# Patient Record
Sex: FEMALE | Race: WHITE | NOT HISPANIC OR LATINO | Employment: FULL TIME | URBAN - METROPOLITAN AREA
[De-identification: names, ages, dates, MRNs, and addresses within clinical notes are randomized per-mention and may not be internally consistent; named-entity substitution may affect disease eponyms.]

---

## 2017-05-02 ENCOUNTER — APPOINTMENT (OUTPATIENT)
Dept: GENERAL RADIOLOGY | Facility: CLINIC | Age: 67
End: 2017-05-02
Attending: EMERGENCY MEDICINE
Payer: COMMERCIAL

## 2017-05-02 ENCOUNTER — HOSPITAL ENCOUNTER (EMERGENCY)
Facility: CLINIC | Age: 67
Discharge: HOME | End: 2017-05-02
Attending: EMERGENCY MEDICINE | Admitting: EMERGENCY MEDICINE
Payer: COMMERCIAL

## 2017-05-02 VITALS
TEMPERATURE: 98.1 F | HEART RATE: 81 BPM | SYSTOLIC BLOOD PRESSURE: 123 MMHG | DIASTOLIC BLOOD PRESSURE: 66 MMHG | OXYGEN SATURATION: 97 % | RESPIRATION RATE: 16 BRPM

## 2017-05-02 DIAGNOSIS — I95.1 ORTHOSTATIC SYNCOPE: ICD-10-CM

## 2017-05-02 LAB
ALBUMIN SERPL-MCNC: 3.5 G/DL (ref 3.4–5)
ALBUMIN UR-MCNC: NEGATIVE MG/DL
ALP SERPL-CCNC: 65 U/L (ref 40–150)
ALT SERPL W P-5'-P-CCNC: 20 U/L (ref 0–50)
ANION GAP SERPL CALCULATED.3IONS-SCNC: 5 MMOL/L (ref 3–14)
APPEARANCE UR: CLEAR
AST SERPL W P-5'-P-CCNC: 13 U/L (ref 0–45)
BASOPHILS # BLD AUTO: 0 10E9/L (ref 0–0.2)
BASOPHILS NFR BLD AUTO: 0.5 %
BILIRUB SERPL-MCNC: 0.4 MG/DL (ref 0.2–1.3)
BILIRUB UR QL STRIP: NEGATIVE
BUN SERPL-MCNC: 15 MG/DL (ref 7–30)
CALCIUM SERPL-MCNC: 8.6 MG/DL (ref 8.5–10.1)
CHLORIDE SERPL-SCNC: 105 MMOL/L (ref 94–109)
CO2 SERPL-SCNC: 29 MMOL/L (ref 20–32)
COLOR UR AUTO: YELLOW
CREAT SERPL-MCNC: 1.06 MG/DL (ref 0.52–1.04)
DIFFERENTIAL METHOD BLD: ABNORMAL
EOSINOPHIL # BLD AUTO: 0.1 10E9/L (ref 0–0.7)
EOSINOPHIL NFR BLD AUTO: 0.8 %
ERYTHROCYTE [DISTWIDTH] IN BLOOD BY AUTOMATED COUNT: 13.6 % (ref 10–15)
GFR SERPL CREATININE-BSD FRML MDRD: 52 ML/MIN/1.7M2
GLUCOSE SERPL-MCNC: 103 MG/DL (ref 70–99)
GLUCOSE UR STRIP-MCNC: NEGATIVE MG/DL
HCT VFR BLD AUTO: 35.3 % (ref 35–47)
HGB BLD-MCNC: 12 G/DL (ref 11.7–15.7)
HGB UR QL STRIP: NEGATIVE
HYALINE CASTS #/AREA URNS LPF: 3 /LPF (ref 0–2)
IMM GRANULOCYTES # BLD: 0 10E9/L (ref 0–0.4)
IMM GRANULOCYTES NFR BLD: 0.2 %
INTERPRETATION ECG - MUSE: NORMAL
KETONES UR STRIP-MCNC: NEGATIVE MG/DL
LEUKOCYTE ESTERASE UR QL STRIP: NEGATIVE
LYMPHOCYTES # BLD AUTO: 1.7 10E9/L (ref 0.8–5.3)
LYMPHOCYTES NFR BLD AUTO: 26.9 %
MCH RBC QN AUTO: 33 PG (ref 26.5–33)
MCHC RBC AUTO-ENTMCNC: 34 G/DL (ref 31.5–36.5)
MCV RBC AUTO: 97 FL (ref 78–100)
MONOCYTES # BLD AUTO: 0.5 10E9/L (ref 0–1.3)
MONOCYTES NFR BLD AUTO: 8.1 %
MUCOUS THREADS #/AREA URNS LPF: PRESENT /LPF
NEUTROPHILS # BLD AUTO: 4 10E9/L (ref 1.6–8.3)
NEUTROPHILS NFR BLD AUTO: 63.5 %
NITRATE UR QL: NEGATIVE
NRBC # BLD AUTO: 0 10*3/UL
NRBC BLD AUTO-RTO: 0 /100
PH UR STRIP: 6.5 PH (ref 5–7)
PLATELET # BLD AUTO: 199 10E9/L (ref 150–450)
POTASSIUM SERPL-SCNC: 4 MMOL/L (ref 3.4–5.3)
PROT SERPL-MCNC: 6.9 G/DL (ref 6.8–8.8)
RBC # BLD AUTO: 3.64 10E12/L (ref 3.8–5.2)
RBC #/AREA URNS AUTO: 1 /HPF (ref 0–2)
SODIUM SERPL-SCNC: 139 MMOL/L (ref 133–144)
SP GR UR STRIP: 1.01 (ref 1–1.03)
TROPONIN I SERPL-MCNC: NORMAL UG/L (ref 0–0.04)
URN SPEC COLLECT METH UR: ABNORMAL
UROBILINOGEN UR STRIP-MCNC: NORMAL MG/DL (ref 0–2)
WBC # BLD AUTO: 6.3 10E9/L (ref 4–11)
WBC #/AREA URNS AUTO: <1 /HPF (ref 0–2)

## 2017-05-02 PROCEDURE — 81001 URINALYSIS AUTO W/SCOPE: CPT | Performed by: EMERGENCY MEDICINE

## 2017-05-02 PROCEDURE — 99285 EMERGENCY DEPT VISIT HI MDM: CPT | Mod: 25

## 2017-05-02 PROCEDURE — 80053 COMPREHEN METABOLIC PANEL: CPT | Performed by: EMERGENCY MEDICINE

## 2017-05-02 PROCEDURE — 84484 ASSAY OF TROPONIN QUANT: CPT | Performed by: EMERGENCY MEDICINE

## 2017-05-02 PROCEDURE — 85025 COMPLETE CBC W/AUTO DIFF WBC: CPT | Performed by: EMERGENCY MEDICINE

## 2017-05-02 PROCEDURE — 25000128 H RX IP 250 OP 636: Performed by: EMERGENCY MEDICINE

## 2017-05-02 PROCEDURE — 93005 ELECTROCARDIOGRAM TRACING: CPT

## 2017-05-02 PROCEDURE — 71020 XR CHEST 2 VW: CPT

## 2017-05-02 PROCEDURE — 96361 HYDRATE IV INFUSION ADD-ON: CPT

## 2017-05-02 PROCEDURE — 96360 HYDRATION IV INFUSION INIT: CPT

## 2017-05-02 RX ORDER — SODIUM CHLORIDE 9 MG/ML
1000 INJECTION, SOLUTION INTRAVENOUS CONTINUOUS
Status: DISCONTINUED | OUTPATIENT
Start: 2017-05-02 | End: 2017-05-02 | Stop reason: HOSPADM

## 2017-05-02 RX ADMIN — SODIUM CHLORIDE 1000 ML: 9 INJECTION, SOLUTION INTRAVENOUS at 10:12

## 2017-05-02 NOTE — ED PROVIDER NOTES
CHIEF COMPLAINT:  Dizziness.      HISTORY OF PRESENT ILLNESS:  Helga Geronimo is a 67-year-old woman who was volunteering today when she became very lightheaded and began to slump towards the ground.  She was caught by other people that were there, and she did not actually lose complete consciousness or fall to the ground.  She states over the past month she has been having episodes where particularly when she gets up quickly, she gets lightheaded.  She denies any palpitations, chest pain, shortness of breath, belly pain, headache or any focal numbness or weakness.  She has been losing quite a bit weight on purpose and in 12/2016 had her blood pressure medication decreased by 1/2 because of the weight loss.  She has been continuing to lose weight since then but has not followed up with her doctor yet.  She has an appointment in a few weeks.  The patient denies any black or bloody stools or urinary symptoms.      PAST MEDICAL HISTORY:  Chronic back pain, hypertension, anxiety, depression.  She has had bilateral hip replacements and hysterectomy.  She also has had right knee surgery replacement.      MEDICATIONS:  Prozac, Norco, lisinopril, hydrochlorothiazide, Percocet, Risperdal, tramadol and trazodone.      ALLERGIES:  HMG-CoA R inhibitors.      FAMILY AND SOCIAL HISTORY:  She is here with her daughter.  She does live alone.  She smokes.  She does not drink.  She does do desk work.      REVIEW OF SYSTEMS:  As noted with all other systems being negative.      PHYSICAL EXAMINATION:   GENERAL:  Reveals a white female, supine.   VITAL SIGNS:  Initial blood pressure is 103/60 with pulse of 81, pulse ox 97% on room air.  Temperature 98.1.  Orthostatic blood pressures were changed and her blood pressure did drop down to 89, although her pulse did not change significantly.   HEENT:  Pupils equal, reactive, extraocular movements intact.  Nares and oropharynx clear.   NECK:  Supple and nontender.  No neck vein distention    PULMONARY:  Clear to auscultation.   HEART:  Heart tones regular, no murmurs, rubs or gallops.   ABDOMEN:  Soft.  No mass or hepatosplenomegaly.  2+ femoral pulses.   MUSCULOSKELETAL:  No tenderness of arms or legs.  No back tenderness.   SKIN:  No rash.   LYMPHATICS:  No inguinal adenopathy.   NEUROLOGIC:  Awake, alert and appropriate, fluent speech, no facial asymmetry.  Normal motor and sensation throughout.  Normal coordination.   PSYCHIATRIC:  Normal affect.      EMERGENCY DEPARTMENT COURSE:  The patient was placed on EKG, blood pressure and oximetry monitoring.  EKG was obtained.  This reveals a normal sinus rhythm with a rate of 69, VT interval 168, QRS 78, QTc 404.  No acute ischemic changes are seen.  Chest x-ray is unremarkable.  Lab work reveals unremarkable urine, a white count of 6.3, hemoglobin 12, platelets of 199,000.  Electrolytes, glucose 103, creatinine 1.06, all other electrolytes were normal, troponin less than 0.015.  The patient received a liter of fluids and some food and she is feeling better.  Her symptoms seem most consistent with orthostatic hypotension.  She did have her blood pressure cut in half 4 months ago when she started losing weight.  She is still losing weight and her blood pressures here have been remarkably stable and low.  I think taking her off her blood pressure medicine since it also contains a diuretic would be most beneficial.  We can see how she does and she can follow up with her PMD in a few weeks to recheck.  I think this is unlikely to be acute ischemia or arrhythmia, no sign of PE or dissection.  She does not appear toxic or  GI blood loss.      IMPRESSION:  Orthostatic near-syncope.      PLAN:  As noted.         STALIN BENEDICT MD             D: 2017 11:53   T: 2017 12:32   MT:       Name:     LOREE BECK   MRN:      [id number]        Account:      [id number]   :      1950           Visit Date:   2017      Document:  [id number]

## 2017-05-02 NOTE — ED AVS SNAPSHOT
Emergency Department    [address] [place] [address]    Phone:  [phone]    Fax:  [phone]                                       Helga Geronmio   MRN: [id number]    Department:   Emergency Department   Date of Visit:  5/2/2017           Patient Information     Date Of Birth          1950        Your diagnoses for this visit were:     Orthostatic syncope        You were seen by Doe Rasmussen MD.      Follow-up Information     Schedule an appointment as soon as possible for a visit with Jennie Morales PA-C.    Specialty:  Physician Assistant    Why:  hold lisinopril/hctz    Contact information:    [facility]  [address] [place] [address]  [phone]          Discharge Instructions         Orthostatic Low Blood Pressure (Hypotension)  A blood pressure reading is made up of 2 numbers There is a top number over a bottom number. The top number is the systolic pressure. The bottom number is the diastolic pressure. A normal blood pressure is less than 120 over less than 80. Low blood pressure (hypotension) is a blood pressure that is less than what is normal for you.  Orthostatic hypotension is a type of low blood pressure that occurs only when you change position from lying to standing. It can cause dizziness, lightheadedness, or fainting.  Medicines can cause orthostatic hypotension. These include:    High blood pressure medicines    Water pills (diuretics)    Some heart medicines    Some antidepressants    Pain, anxiety, sedative, and sleeping medicines  Other causes include:    Dehydration from vomiting, diarrhea, or not getting enough fluids    Severe infection    High fever    Blood loss. This could be bleeding from the stomach or intestines.  Treatment will depend on what is causing your low blood pressure.  Home care  Follow these guidelines when caring for yourself at home:    Rest until your symptoms get better.    Change positions slowly from lying to  standing. When getting out of bed, sit on the side of the bed with your legs down for at least 30 seconds before standing. This gives your body time to adjust to the position change.    Follow the treatment plan described by your health care provider.  Follow-up care  Follow up with your health care provider, or as advised.  When to seek medical advice  Call your health care provider right away if any of these occur:    Dizziness, lightheadedness, or fainting    Black or red color in your stools or vomit    Diarrhea or vomiting that doesn t go away    You aren t able to eat or drink    Fever of 100.4 F (38 C) or higher, or as directed by your health care provider    Burning when you urinate    Foul-smelling urine    3862-6907 [employer]. [address] [place] [address], [place] [address]. All rights reserved. This information is not intended as a substitute for professional medical care. Always follow your healthcare professional's instructions.          24 Hour Appointment Hotline       To make an appointment at any [facility], call [phone] ([phone]). If you don't have a family doctor or clinic, we will help you find one. [facility] are conveniently located to serve the needs of you and your family.             Review of your medicines      Our records show that you are taking the medicines listed below. If these are incorrect, please call your family doctor or clinic.        Dose / Directions Last dose taken    HYDROcodone-acetaminophen 5-325 MG per tablet   Commonly known as:  NORCO   Dose:  1-2 tablet   Quantity:  20 tablet        Take 1-2 tablets by mouth every 4 hours as needed for pain   Refills:  0        LISINOPRIL PO        Refills:  0        oxyCODONE-acetaminophen 5-325 MG per tablet   Commonly known as:  PERCOCET   Dose:  1-2 tablet   Quantity:  20 tablet        Take 1-2 tablets by mouth every 4 hours as needed for moderate to severe pain   Refills:  0        PROZAC PO    Dose:  40 mg        Take 40 mg by mouth 2 times daily   Refills:  0        RISPERIDONE PO        Refills:  0        TRAMADOL HCL PO        Refills:  0        TRAZODONE HCL PO   Dose:  50 mg        Take 50 mg by mouth At Bedtime   Refills:  0        UNKNOWN TO PATIENT        HCTZ-combined with unknown medication   Refills:  0                Procedures and tests performed during your visit     CBC with platelets differential    Comprehensive metabolic panel    EKG 12 lead    Troponin I    UA with Microscopic    XR Chest 2 Views      Orders Needing Specimen Collection     None      Pending Results     No orders found from 4/30/2017 to 5/3/2017.            Pending Culture Results     No orders found from 4/30/2017 to 5/3/2017.            Pending Results Instructions     If you had any lab results that were not finalized at the time of your Discharge, you can call the ED Lab Result RN at [phone]. You will be contacted by this team for any positive Lab results or changes in treatment. The nurses are available 7 days a week from 10A to 6:30P.  You can leave a message 24 hours per day and they will return your call.        Test Results From Your Hospital Stay        5/2/2017 10:34 AM      Component Results     Component Value Ref Range & Units Status    WBC 6.3 4.0 - 11.0 10e9/L Final    RBC Count 3.64 (L) 3.8 - 5.2 10e12/L Final    Hemoglobin 12.0 11.7 - 15.7 g/dL Final    Hematocrit 35.3 35.0 - 47.0 % Final    MCV 97 78 - 100 fl Final    MCH 33.0 26.5 - 33.0 pg Final    MCHC 34.0 31.5 - 36.5 g/dL Final    RDW 13.6 10.0 - 15.0 % Final    Platelet Count 199 150 - 450 10e9/L Final    Diff Method Automated Method  Final    % Neutrophils 63.5 % Final    % Lymphocytes 26.9 % Final    % Monocytes 8.1 % Final    % Eosinophils 0.8 % Final    % Basophils 0.5 % Final    % Immature Granulocytes 0.2 % Final    Nucleated RBCs 0 0 /100 Final    Absolute Neutrophil 4.0 1.6 - 8.3 10e9/L Final    Absolute Lymphocytes 1.7 0.8 - 5.3  10e9/L Final    Absolute Monocytes 0.5 0.0 - 1.3 10e9/L Final    Absolute Eosinophils 0.1 0.0 - 0.7 10e9/L Final    Absolute Basophils 0.0 0.0 - 0.2 10e9/L Final    Abs Immature Granulocytes 0.0 0 - 0.4 10e9/L Final    Absolute Nucleated RBC 0.0  Final         5/2/2017 11:06 AM      Component Results     Component Value Ref Range & Units Status    Sodium 139 133 - 144 mmol/L Final    Potassium 4.0 3.4 - 5.3 mmol/L Final    Chloride 105 94 - 109 mmol/L Final    Carbon Dioxide 29 20 - 32 mmol/L Final    Anion Gap 5 3 - 14 mmol/L Final    Glucose 103 (H) 70 - 99 mg/dL Final    Urea Nitrogen 15 7 - 30 mg/dL Final    Creatinine 1.06 (H) 0.52 - 1.04 mg/dL Final    GFR Estimate 52 (L) >60 mL/min/1.7m2 Final    Non  GFR Calc    GFR Estimate If Black 63 >60 mL/min/1.7m2 Final    African American GFR Calc    Calcium 8.6 8.5 - 10.1 mg/dL Final    Bilirubin Total 0.4 0.2 - 1.3 mg/dL Final    Albumin 3.5 3.4 - 5.0 g/dL Final    Protein Total 6.9 6.8 - 8.8 g/dL Final    Alkaline Phosphatase 65 40 - 150 U/L Final    ALT 20 0 - 50 U/L Final    AST 13 0 - 45 U/L Final         5/2/2017 11:06 AM      Component Results     Component Value Ref Range & Units Status    Troponin I ES  0.000 - 0.045 ug/L Final    <0.015  The 99th percentile for upper reference range is 0.045 ug/L.  Troponin values in   the range of 0.045 - 0.120 ug/L may be associated with risks of adverse   clinical events.           5/2/2017 10:53 AM      Component Results     Component Value Ref Range & Units Status    Color Urine Yellow  Final    Appearance Urine Clear  Final    Glucose Urine Negative NEG mg/dL Final    Bilirubin Urine Negative NEG Final    Ketones Urine Negative NEG mg/dL Final    Specific Gravity Urine 1.012 1.003 - 1.035 Final    Blood Urine Negative NEG Final    pH Urine 6.5 5.0 - 7.0 pH Final    Protein Albumin Urine Negative NEG mg/dL Final    Urobilinogen mg/dL Normal 0.0 - 2.0 mg/dL Final    Nitrite Urine Negative NEG Final     Leukocyte Esterase Urine Negative NEG Final    Source Midstream Urine  Final    WBC Urine <1 0 - 2 /HPF Final    RBC Urine 1 0 - 2 /HPF Final    Mucous Urine Present (A) NEG /LPF Final    Hyaline Casts 3 (H) 0 - 2 /LPF Final         5/2/2017 10:35 AM      Narrative     XR CHEST 2 VW 5/2/2017 10:33 AM    HISTORY: dizzy, smoker        Impression     IMPRESSION: Negative.    BG PARIS MD                Clinical Quality Measure: Blood Pressure Screening     Your blood pressure was checked while you were in the emergency department today. The last reading we obtained was  BP: 120/78 . Please read the guidelines below about what these numbers mean and what you should do about them.  If your systolic blood pressure (the top number) is less than 120 and your diastolic blood pressure (the bottom number) is less than 80, then your blood pressure is normal. There is nothing more that you need to do about it.  If your systolic blood pressure (the top number) is 120-139 or your diastolic blood pressure (the bottom number) is 80-89, your blood pressure may be higher than it should be. You should have your blood pressure rechecked within a year by a primary care provider.  If your systolic blood pressure (the top number) is 140 or greater or your diastolic blood pressure (the bottom number) is 90 or greater, you may have high blood pressure. High blood pressure is treatable, but if left untreated over time it can put you at risk for heart attack, stroke, or kidney failure. You should have your blood pressure rechecked by a primary care provider within the next 4 weeks.  If your provider in the emergency department today gave you specific instructions to follow-up with your doctor or provider even sooner than that, you should follow that instruction and not wait for up to 4 weeks for your follow-up visit.        Thank you for choosing [facility]       Thank you for choosing [facility] for your care. Our goal is always to provide  "you with excellent care. Hearing back from our patients is one way we can continue to improve our services. Please take a few minutes to complete the written survey that you may receive in the mail after you visit with us. Thank you!        [employer] Information     [employer] lets you send messages to your doctor, view your test results, renew your prescriptions, schedule appointments and more. To sign up, go to [email or web address] . Click on \"Log in\" on the left side of the screen, which will take you to the Welcome page. Then click on \"Sign up Now\" on the right side of the page.     You will be asked to enter the access code listed below, as well as some personal information. Please follow the directions to create your username and password.     Your access code is: [email or web address]  Expires: 2017  9:45 AM     Your access code will  in 90 days. If you need help or a new code, please call your [facility] clinic or [phone].        Care EveryWhere ID     This is your Care EveryWhere ID. This could be used by other organizations to access your [facility] medical records  [id number]        After Visit Summary       This is your record. Keep this with you and show to your community pharmacist(s) and doctor(s) at your next visit.                  "

## 2017-05-02 NOTE — ED NOTES
Bed: ED10  Expected date:   Expected time:   Means of arrival:   Comments:  425  67 F syncope/hypotensive  0952

## 2017-05-02 NOTE — DISCHARGE INSTRUCTIONS
Orthostatic Low Blood Pressure (Hypotension)  A blood pressure reading is made up of 2 numbers There is a top number over a bottom number. The top number is the systolic pressure. The bottom number is the diastolic pressure. A normal blood pressure is less than 120 over less than 80. Low blood pressure (hypotension) is a blood pressure that is less than what is normal for you.  Orthostatic hypotension is a type of low blood pressure that occurs only when you change position from lying to standing. It can cause dizziness, lightheadedness, or fainting.  Medicines can cause orthostatic hypotension. These include:    High blood pressure medicines    Water pills (diuretics)    Some heart medicines    Some antidepressants    Pain, anxiety, sedative, and sleeping medicines  Other causes include:    Dehydration from vomiting, diarrhea, or not getting enough fluids    Severe infection    High fever    Blood loss. This could be bleeding from the stomach or intestines.  Treatment will depend on what is causing your low blood pressure.  Home care  Follow these guidelines when caring for yourself at home:    Rest until your symptoms get better.    Change positions slowly from lying to standing. When getting out of bed, sit on the side of the bed with your legs down for at least 30 seconds before standing. This gives your body time to adjust to the position change.    Follow the treatment plan described by your health care provider.  Follow-up care  Follow up with your health care provider, or as advised.  When to seek medical advice  Call your health care provider right away if any of these occur:    Dizziness, lightheadedness, or fainting    Black or red color in your stools or vomit    Diarrhea or vomiting that doesn t go away    You aren t able to eat or drink    Fever of 100.4 F (38 C) or higher, or as directed by your health care provider    Burning when you urinate    Foul-smelling urine    [id number] [facility]  [employer]. [address] [place] [address], [place] [address]. All rights reserved. This information is not intended as a substitute for professional medical care. Always follow your healthcare professional's instructions.

## 2017-05-02 NOTE — ED AVS SNAPSHOT
Emergency Department    [address] [place] [address]    Phone:  [phone]    Fax:  [phone]                                       Helga Geronimo   MRN: [id number]    Department:   Emergency Department   Date of Visit:  5/2/2017           After Visit Summary Signature Page     I have received my discharge instructions, and my questions have been answered. I have discussed any challenges I see with this plan with the nurse or doctor.    ..........................................................................................................................................  Patient/Patient Representative Signature      ..........................................................................................................................................  Patient Representative Print Name and Relationship to Patient    ..................................................               ................................................  Date                                            Time    ..........................................................................................................................................  Reviewed by Signature/Title    ...................................................              ..............................................  Date                                                            Time

## 2017-05-05 ENCOUNTER — HOSPITAL ENCOUNTER (OUTPATIENT)
Dept: MAMMOGRAPHY | Facility: CLINIC | Age: 67
Discharge: HOME | End: 2017-05-05
Attending: PHYSICIAN ASSISTANT | Admitting: PHYSICIAN ASSISTANT
Payer: COMMERCIAL

## 2017-05-05 DIAGNOSIS — Z12.31 VISIT FOR SCREENING MAMMOGRAM: ICD-10-CM

## 2017-05-05 PROCEDURE — G0202 SCR MAMMO BI INCL CAD: HCPCS

## 2018-05-07 ENCOUNTER — HOSPITAL ENCOUNTER (OUTPATIENT)
Dept: MAMMOGRAPHY | Facility: CLINIC | Age: 68
Discharge: HOME | End: 2018-05-07
Attending: PHYSICIAN ASSISTANT | Admitting: PHYSICIAN ASSISTANT
Payer: COMMERCIAL

## 2018-05-07 DIAGNOSIS — Z12.39 BREAST CANCER SCREENING: ICD-10-CM

## 2018-05-07 PROCEDURE — 77067 SCR MAMMO BI INCL CAD: CPT

## 2018-06-04 ENCOUNTER — THERAPY VISIT (OUTPATIENT)
Dept: PHYSICAL THERAPY | Facility: CLINIC | Age: 68
End: 2018-06-04
Payer: COMMERCIAL

## 2018-06-04 DIAGNOSIS — M25.512 LEFT SHOULDER PAIN: Primary | ICD-10-CM

## 2018-06-04 PROCEDURE — 97112 NEUROMUSCULAR REEDUCATION: CPT | Mod: GP | Performed by: PHYSICAL THERAPIST

## 2018-06-04 PROCEDURE — 97161 PT EVAL LOW COMPLEX 20 MIN: CPT | Mod: GP | Performed by: PHYSICAL THERAPIST

## 2018-06-04 PROCEDURE — 97110 THERAPEUTIC EXERCISES: CPT | Mod: GP | Performed by: PHYSICAL THERAPIST

## 2018-06-04 NOTE — PROGRESS NOTES
"[facility] Initial Evaluation  Subjective:  Patient is a 68 year old female presenting with rehab left shoulder hpi. The history is provided by the patient. No  was used.   Helga Geronimo is a 68 year old female with a left shoulder condition.        Patient had insidious exacerbation of left shoulder/upper trapezius area pain about 6 months ago, has had pain for ~ 5 years.  Pain was constant, ranged 7-8/10 prior to injection (on 5-25-18), 0-2/10 since injection - described as \"deep ache\" and \"sharp\" if reaching.  Symptoms increase with reaching out to the side, reaching behind to hook bra, lying on the left side >1 hour (losing <1 hour sleep/night).  Symptoms decrease with ice.  Current exercises include doorway stretch in abd/ER position and a wall-push up stretch - both hurt.  Patient is right-handed.  .             Pain is the same all the time.     Pain course: worsening prior to injection; improved since injection.    Previous treatment includes chiropractic (massage and exercises ).    General health as reported by patient is good.  Pertinent medical history includes:  Depression, high blood pressure, smoking and osteoarthritis.  Medical allergies: yes (statins).  Other surgeries include:  Orthopedic surgery (R TKA; B ANGELINA).  Current medications:  High blood pressure medication, sleep medication and anti-depressants.  Current occupation is Clinical research - [employer]  Patient is working in normal job with restrictions.  Primary job tasks include:  Prolonged sitting.    Barriers include:  None as reported by the patient.    Red flags:  None as reported by the patient.                        Objective:  Standing Alignment:    Cervical/Thoracic:  Forward head and cervical lordosis decreased  Shoulder/UE:  Rounded shoulders and superior fossa atrophy R                                       Shoulder Evaluation:  ROM:  AROM:    Flexion:  Left:  130, pain past 120  "   Right:  WNL    Abduction:  Left: 98, pain past 90 degrees   Right:  WNL    Internal Rotation:  Left:  WNL    Right:  WNL  External Rotation:  Left:  31 at 60 degrees abduction    Right:  69-ERP            Extension/Internal Rotation:  Left:  [id number]    Right:  T6    PROM:    Flexion:  Left:  149        Extension:  Left:  Mod restriction    Right:  WNL      Internal Rotation:  Right:  WNL  External Rotation:  Left:  30 degrees at 60 degrees abduction    Right:  69-ERP                        Special Tests:      Left shoulder negative for the following special tests:  Impingement and Bursal    Right shoulder negative for the following special tests:Impingement and Bursal                                       General     ROS   MMT right shoulder WNL.  MMT left shoulder flexion and IR 4/5, ER and abduction 4+/5, elbow WNL.     Trial repeated right shoulder extension with overpressure with wand in standing:  Increase abduction ROM to 128 degrees, flexion to 136 degrees and pain at end range only.       Assessment/Plan:    Patient is a 68 year old female with left side shoulder complaints.    Patient has the following significant findings with corresponding treatment plan.                Diagnosis 1:  Left shoulder pain/OA    Pain -  self management, education, directional preference exercise and home program  Decreased ROM/flexibility - therapeutic exercise and home program  Decreased strength - therapeutic exercise, therapeutic activities and home program  Decreased function - therapeutic activities and home program    Therapy Evaluation Codes:   1) History comprised of:   Personal factors that impact the plan of care:      Time since onset of symptoms.    Comorbidity factors that impact the plan of care are:      None.     Medications impacting care: None.  2) Examination of Body Systems comprised of:   Body structures and functions that impact the plan of care:      Shoulder.   Activity limitations that impact  the plan of care are:      Dressing, Lifting, Sleeping and Laying down.  3) Clinical presentation characteristics are:   Stable/Uncomplicated.  4) Decision-Making    Low complexity using standardized patient assessment instrument and/or measureable assessment of functional outcome.  Cumulative Therapy Evaluation is: Low complexity.    Previous and current functional limitations:  (See Goal Flow Sheet for this information)    Short term and Long term goals: (See Goal Flow Sheet for this information)     Communication ability:  Patient appears to be able to clearly communicate and understand verbal and written communication and follow directions correctly.  Treatment Explanation - The following has been discussed with the patient:   RX ordered/plan of care  Anticipated outcomes  Possible risks and side effects  This patient would benefit from PT intervention to resume normal activities.   Rehab potential is good.    Frequency:  2 X week, once daily for 1 week, then 1x/week once daily for 4 weeks  Discharge Plan:  Achieve all LTG.  Independent in home treatment program.  Reach maximal therapeutic benefit.      Please refer to the daily flowsheet for treatment today, total treatment time and time spent performing 1:1 timed codes.

## 2018-06-04 NOTE — MR AVS SNAPSHOT
"              After Visit Summary   6/4/2018    Helga Geronimo    MRN: [id number]           Patient Information     Date Of Birth          1950        Visit Information        Provider Department      6/4/2018 4:00 PM Mercedez Troy, PT [facility] Athletic [facility] Physical [facility]        Today's Diagnoses     Left shoulder pain    -  1       Follow-ups after your visit        Your next 10 appointments already scheduled     Jun 07, 2018  3:50 PM CDT   DA Extremity with Sylvia Holley PT   [facility] Physical Therapy ([facility]  )    [address] [place] [address]  [facility] [id number] [phone]            Jun 14, 2018  3:50 PM CDT   DA Extremity with Sylvia Holley PT   [facility] Physical [facility] ([facility]  )    [address] [place] [address]  [facility] [id number] [phone]              Who to contact     If you have questions or need follow up information about today's clinic visit or your schedule please contact [facility] PHYSICAL THERAPY directly at [phone].  Normal or non-critical lab and imaging results will be communicated to you by Theranoshart, letter or phone within 4 business days after the clinic has received the results. If you do not hear from us within 7 days, please contact the clinic through Theranoshart or phone. If you have a critical or abnormal lab result, we will notify you by phone as soon as possible.  Submit refill requests through [employer] or call your pharmacy and they will forward the refill request to us. Please allow 3 business days for your refill to be completed.          Additional Information About Your Visit        MyChart Information     [employer] lets you send messages to your doctor, view your test results, renew your prescriptions, schedule appointments and more. To sign up, go to [email or web address] . Click on \"Log in\" on the left " "side of the screen, which will take you to the Welcome page. Then click on \"Sign up Now\" on the right side of the page.     You will be asked to enter the access code listed below, as well as some personal information. Please follow the directions to create your username and password.     Your access code is: [email or web address]  Expires: 2018  6:12 PM     Your access code will  in 90 days. If you need help or a new code, please call your [facility] or [phone].        Care EveryWhere ID     This is your Care EveryWhere ID. This could be used by other organizations to access your [place] medical records  [id number]         Blood Pressure from Last 3 Encounters:   17 123/66   16 118/72   04/27/15 120/77    Weight from Last 3 Encounters:   16 114.3 kg (252 lb)   04/27/15 104.3 kg (230 lb)   13 113.4 kg (250 lb)              We Performed the Following     HC PT EVAL, LOW COMPLEXITY     AD INITIAL EVAL REPORT     NEUROMUSCULAR RE-EDUCATION     THERAPEUTIC EXERCISES        Primary Care Provider Office Phone # Fax #    Jennie Morales PA-C [phone]       [facility] [address] [place] [address]        Equal Access to Services     ARNOLDO FOUNT[facility] : Hadii aad ku hadasho Soomaali, waaxda luqadaha, qaybta kaalmada adeegyada, bonnie ferrer. So [facility] [phone].    ATENCIÓN: Si habla español, tiene a scales disposición servicios gratuitos de asistencia lingüística. Galindo al [phone].    We comply with applicable federal civil rights laws and [place] laws. We do not discriminate on the basis of race, color, national origin, age, disability, sex, sexual orientation, or gender identity.            Thank you!     Thank you for choosing [facility] PHYSICAL THERAPY  for your care. Our goal is always to provide you with excellent care. Hearing back from our patients is one way we can continue to improve our " services. Please take a few minutes to complete the written survey that you may receive in the mail after your visit with us. Thank you!             Your Updated Medication List - Protect others around you: Learn how to safely use, store and throw away your medicines at www.disposemymeds.org.          This list is accurate as of 6/4/18  6:12 PM.  Always use your most recent med list.                   Brand Name Dispense Instructions for use Diagnosis    HYDROcodone-acetaminophen 5-325 MG per tablet    NORCO    20 tablet    Take 1-2 tablets by mouth every 4 hours as needed for pain        LISINOPRIL PO           oxyCODONE-acetaminophen 5-325 MG per tablet    PERCOCET    20 tablet    Take 1-2 tablets by mouth every 4 hours as needed for moderate to severe pain        PROZAC PO      Take 40 mg by mouth 2 times daily        RISPERIDONE PO           TRAMADOL HCL PO           TRAZODONE HCL PO      Take 50 mg by mouth At Bedtime        UNKNOWN TO PATIENT      HCTZ-combined with unknown medication

## 2018-06-14 ENCOUNTER — THERAPY VISIT (OUTPATIENT)
Dept: PHYSICAL THERAPY | Facility: CLINIC | Age: 68
End: 2018-06-14
Payer: COMMERCIAL

## 2018-06-14 DIAGNOSIS — M25.512 LEFT SHOULDER PAIN: ICD-10-CM

## 2018-06-14 PROCEDURE — 97112 NEUROMUSCULAR REEDUCATION: CPT | Mod: GP | Performed by: PHYSICAL THERAPIST

## 2018-06-14 PROCEDURE — 97110 THERAPEUTIC EXERCISES: CPT | Mod: GP | Performed by: PHYSICAL THERAPIST

## 2018-06-14 NOTE — LETTER
"[facility]AP[facility]  [address] [address]  [phone]    2019    Re: Helga Geronimo   :   1950  MRN:  [id number]   REFERRING PHYSICIAN:   Shola Yu    [facility]    Date of Initial Evaluation:  2018  Visits:  Rxs Used: 2  Reason for Referral:  Left shoulder pain     DISCHARGE REPORT  Progress reporting period is from 2018 to 2018.       SUBJECTIVE  When last seen on 2018, patient reported, \"I think it's feeling better.  I can do the exercises more often and more repetitions.\"  She reported being able to open her car door using her L hand and could also reach for her seatbelt easier.  She did the extension exercises about 3x/day--did not notice effect but usually did not have pain before doing them.  She reported her L arm was about as good as her R arm at that point, although, she still could not reach above her head quite as far.  Current objective measurements are unavailable as patient did not return for additional follow-up visits.  Current pain level is:  Unknown as patient did not return for additional follow-up visits.  Initial Pain level: (0-2/10 since injection 5-25-18; 7-8/10 prior to injection).   Changes in function:  Unknown as patient did not return for additional follow-up visits.  Adverse reaction to treatment or activity: Unknown as patient did not return for additional follow-up visits.    OBJECTIVE  Objective:  As of 2018:   L shoulder AROM:  flexion 145 degrees, abduction 135 degrees, IR/extension T9.      Current objective measurements are unavailable as patient did not return for additional follow-up visits.    ASSESSMENT/PLAN  Patient was only seen for 2 visits with treatment focusing on repeated L shoulder extension exercises in addition to scapular and rotator cuff strengthening exercises to address L " shoulder pain.  She reported improvements with pain and function at her last visit.  She has not returned for additional follow-up visits so current assessment is unavailable.  Updated problem list and treatment plan: Diagnosis 1:  L shoulder pain   No updated problem list or treatment plan as patient did not return for additional visits and is discharged from PT at this time.    STG/LTGs have been met or progress has been made towards goals:  Unknown as  patient did not return for additional follow-up visits.    Assessment of Progress: The patient has not returned to therapy. Current status is unknown.  Self Management Plans:  Patient has been instructed in a home treatment program.  Patient  has been instructed in self management of symptoms.  Helga continues to require the following intervention to meet STG and LTG's:  PT intervention is no longer required to meet STG/LTG.    Recommendations:  Patient is discharged from PT as she did not return for further follow-up visits.    Thank you for your referral.      INQUIRIES  Therapist: Sylvia Holley, PT, DPT  [facility] PHYSICAL THERAPY  [address] [address]  Phone: [phone]  Fax: [phone]

## 2018-06-14 NOTE — MR AVS SNAPSHOT
"              After Visit Summary   6/14/2018    Helga Geronimo    MRN: [id number]           Patient Information     Date Of Birth          1950        Visit Information        Provider Department      6/14/2018 3:50 PM Sylvia Holley PT [facility] Athletic [facility] Physical Therapy        Today's Diagnoses     Left shoulder pain           Follow-ups after your visit        Your next 10 appointments already scheduled     Jun 25, 2018  4:00 PM CDT   DA Extremity with Sylvia Holley PT   [facility] Athletic [facility] ([facility]  [facility]    [address] [id number] [phone]              Who to contact     If you have questions or need follow up information about today's clinic visit or your schedule please contact [facility] ATHLETIC [facility] PHYSICAL THERAPY directly at [phone].  Normal or non-critical lab and imaging results will be communicated to you by MyChart, letter or phone within 4 business days after the clinic has received the results. If you do not hear from us within 7 days, please contact the clinic through [employer] or phone. If you have a critical or abnormal lab result, we will notify you by phone as soon as possible.  Submit refill requests through [employer] or call your pharmacy and they will forward the refill request to us. Please allow 3 business days for your refill to be completed.          Additional Information About Your Visit        MyChart Information     [employer] lets you send messages to your doctor, view your test results, renew your prescriptions, schedule appointments and more. To sign up, go to [email or web address] . Click on \"Log in\" on the left side of the screen, which will take you to the Welcome page. Then click on \"Sign up Now\" on the right side of the page.     You will be asked to enter the access code listed below, as well as some personal information. Please follow the " directions to create your username and password.     Your access code is: [email or web address]  Expires: 2018  6:12 PM     Your access code will  in 90 days. If you need help or a new code, please call your [place] clinic or [phone].        Care EveryWhere ID     This is your Care EveryWhere ID. This could be used by other organizations to access your [place] medical records  [id number]         Blood Pressure from Last 3 Encounters:   17 123/66   16 118/72   04/27/15 120/77    Weight from Last 3 Encounters:   16 114.3 kg (252 lb)   04/27/15 104.3 kg (230 lb)   13 113.4 kg (250 lb)              We Performed the Following     Neuromuscular Re-Education     Therapeutic Exercises        Primary Care Provider Office Phone # Fax #    Jennie Morales PA-C [phone]       [facility] [address] [place] [address]        Equal Access to Services     [facility]: Hadii aad ku hadasho Soomaali, waaxda luqadaha, qaybta kaalmada adeegyada, waxay idiin haynyla brown . So [facility] [phone].    ATENCIÓN: Si habla español, tiene a scales disposición servicios gratuitos de asistencia lingüística. Llame al [phone].    We comply with applicable federal civil rights laws and [place] laws. We do not discriminate on the basis of race, color, national origin, age, disability, sex, sexual orientation, or gender identity.            Thank you!     Thank you for choosing [facility] PHYSICAL THERAPY  for your care. Our goal is always to provide you with excellent care. Hearing back from our patients is one way we can continue to improve our services. Please take a few minutes to complete the written survey that you may receive in the mail after your visit with us. Thank you!             Your Updated Medication List - Protect others around you: Learn how to safely use, store and throw away your medicines at www.disposemymeds.org.           This list is accurate as of 6/14/18  8:30 PM.  Always use your most recent med list.                   Brand Name Dispense Instructions for use Diagnosis    HYDROcodone-acetaminophen 5-325 MG per tablet    NORCO    20 tablet    Take 1-2 tablets by mouth every 4 hours as needed for pain        LISINOPRIL PO           oxyCODONE-acetaminophen 5-325 MG per tablet    PERCOCET    20 tablet    Take 1-2 tablets by mouth every 4 hours as needed for moderate to severe pain        PROZAC PO      Take 40 mg by mouth 2 times daily        RISPERIDONE PO           TRAMADOL HCL PO           TRAZODONE HCL PO      Take 50 mg by mouth At Bedtime        UNKNOWN TO PATIENT      HCTZ-combined with unknown medication

## 2019-02-11 NOTE — PROGRESS NOTES
"Subjective:  HPI                    Objective:  System    Physical Exam     General     ROS    Assessment/Plan:    DISCHARGE REPORT    Progress reporting period is from 06/04/2018 to 06/14/2018.       SUBJECTIVE  Subjective:  When last seen on 06/14/2018, patient reported, \"I think it's feeling better.  I can do the exercises more often and more repetitions.\"  She reported being able to open her car door using her L hand and could also reach for her seatbelt easier.  She did the extension exercises about 3x/day--did not notice effect but usually did not have pain before doing them.  She reported her L arm was about as good as her R arm at that point, although, she still could not reach above her head quite as far.  Current objective measurements are unavailable as patient did not return for additional follow-up visits.  Current pain level is:  Unknown as patient did not return for additional follow-up visits.  Initial Pain level: (0-2/10 since injection 5-25-18; 7-8/10 prior to injection).   Changes in function:  Unknown as patient did not return for additional follow-up visits.  Adverse reaction to treatment or activity: Unknown as patient did not return for additional follow-up visits.    OBJECTIVE  Objective:  As of 06/14/2018:   L shoulder AROM:  flexion 145 degrees, abduction 135 degrees, IR/extension T9.      Current objective measurements are unavailable as patient did not return for additional follow-up visits.    ASSESSMENT/PLAN  Patient was only seen for 2 visits with treatment focusing on repeated L shoulder extension exercises in addition to scapular and rotator cuff strengthening exercises to address L shoulder pain.  She reported improvements with pain and function at her last visit.  She has not returned for additional follow-up visits so current assessment is unavailable.  Updated problem list and treatment plan: Diagnosis 1:  L shoulder pain   No updated problem list or treatment plan as patient did " not return for additional visits and is discharged from PT at this time.    STG/LTGs have been met or progress has been made towards goals:  Unknown as patient did not return for additional follow-up visits.  Assessment of Progress: The patient has not returned to therapy. Current status is unknown.  Self Management Plans:  Patient has been instructed in a home treatment program.  Patient  has been instructed in self management of symptoms.  Helga continues to require the following intervention to meet STG and LTG's:  PT intervention is no longer required to meet STG/LTG.    Recommendations:  Patient is discharged from PT as she did not return for further follow-up visits.    Please refer to the daily flowsheet for treatment today, total treatment time and time spent performing 1:1 timed codes.

## 2019-09-23 ENCOUNTER — HOSPITAL ENCOUNTER (OUTPATIENT)
Dept: CT IMAGING | Facility: CLINIC | Age: 69
End: 2019-09-23
Attending: PHYSICIAN ASSISTANT
Payer: COMMERCIAL

## 2019-09-23 ENCOUNTER — HOSPITAL ENCOUNTER (OUTPATIENT)
Dept: MAMMOGRAPHY | Facility: CLINIC | Age: 69
Discharge: HOME | End: 2019-09-23
Attending: PHYSICIAN ASSISTANT | Admitting: PHYSICIAN ASSISTANT
Payer: COMMERCIAL

## 2019-09-23 DIAGNOSIS — Z12.31 VISIT FOR SCREENING MAMMOGRAM: ICD-10-CM

## 2019-09-23 DIAGNOSIS — J44.9 COPD (CHRONIC OBSTRUCTIVE PULMONARY DISEASE) (H): ICD-10-CM

## 2019-09-23 PROCEDURE — G0297 LDCT FOR LUNG CA SCREEN: HCPCS

## 2019-09-23 PROCEDURE — 77063 BREAST TOMOSYNTHESIS BI: CPT

## 2019-11-19 RX ORDER — GABAPENTIN 300 MG/1
1200 CAPSULE ORAL
COMMUNITY
End: 2020-08-25

## 2019-11-19 RX ORDER — RISPERIDONE 1 MG/1
1 TABLET ORAL 3 TIMES DAILY
COMMUNITY

## 2019-11-19 RX ORDER — GABAPENTIN 300 MG/1
900 CAPSULE ORAL 3 TIMES DAILY
COMMUNITY

## 2019-11-19 RX ORDER — MULTIVITAMIN,THERAPEUTIC
1 TABLET ORAL AT BEDTIME
Status: ON HOLD | COMMUNITY
End: 2024-07-30

## 2019-11-19 RX ORDER — LOSARTAN POTASSIUM AND HYDROCHLOROTHIAZIDE 12.5; 1 MG/1; MG/1
1 TABLET ORAL AT BEDTIME
Status: ON HOLD | COMMUNITY
End: 2024-07-30

## 2019-11-19 RX ORDER — BUDESONIDE AND FORMOTEROL FUMARATE DIHYDRATE 80; 4.5 UG/1; UG/1
2 AEROSOL RESPIRATORY (INHALATION) 2 TIMES DAILY
COMMUNITY

## 2019-11-19 RX ORDER — RISPERIDONE 0.5 MG/1
0.5 TABLET ORAL DAILY PRN
Status: ON HOLD | COMMUNITY
End: 2024-07-30

## 2019-11-19 RX ORDER — BUSPIRONE HYDROCHLORIDE 15 MG/1
15 TABLET ORAL DAILY
Status: ON HOLD | COMMUNITY
End: 2019-11-20

## 2019-11-20 ENCOUNTER — ANESTHESIA EVENT (OUTPATIENT)
Dept: SURGERY | Facility: CLINIC | Age: 69
DRG: 483 | End: 2019-11-20
Payer: COMMERCIAL

## 2019-11-20 ENCOUNTER — APPOINTMENT (OUTPATIENT)
Dept: GENERAL RADIOLOGY | Facility: CLINIC | Age: 69
DRG: 483 | End: 2019-11-20
Attending: ORTHOPAEDIC SURGERY
Payer: COMMERCIAL

## 2019-11-20 ENCOUNTER — ANESTHESIA (OUTPATIENT)
Dept: SURGERY | Facility: CLINIC | Age: 69
DRG: 483 | End: 2019-11-20
Payer: COMMERCIAL

## 2019-11-20 ENCOUNTER — HOSPITAL ENCOUNTER (INPATIENT)
Facility: CLINIC | Age: 69
LOS: 1 days | Discharge: HOME | DRG: 483 | End: 2019-11-21
Attending: ORTHOPAEDIC SURGERY | Admitting: ORTHOPAEDIC SURGERY
Payer: COMMERCIAL

## 2019-11-20 DIAGNOSIS — Z96.612 H/O TOTAL SHOULDER REPLACEMENT, LEFT: Primary | ICD-10-CM

## 2019-11-20 LAB
CREAT SERPL-MCNC: 0.98 MG/DL (ref 0.52–1.04)
GFR SERPL CREATININE-BSD FRML MDRD: 59 ML/MIN/{1.73_M2}
POTASSIUM SERPL-SCNC: 3.7 MMOL/L (ref 3.4–5.3)

## 2019-11-20 PROCEDURE — 25000125 ZZHC RX 250: Performed by: ORTHOPAEDIC SURGERY

## 2019-11-20 PROCEDURE — 82565 ASSAY OF CREATININE: CPT | Performed by: ANESTHESIOLOGY

## 2019-11-20 PROCEDURE — 25800030 ZZH RX IP 258 OP 636: Performed by: ANESTHESIOLOGY

## 2019-11-20 PROCEDURE — L3670 SO ACRO/CLAV CAN WEB PRE OTS: HCPCS

## 2019-11-20 PROCEDURE — 25000132 ZZH RX MED GY IP 250 OP 250 PS 637: Performed by: ORTHOPAEDIC SURGERY

## 2019-11-20 PROCEDURE — 25000128 H RX IP 250 OP 636: Performed by: ORTHOPAEDIC SURGERY

## 2019-11-20 PROCEDURE — 25000566 ZZH SEVOFLURANE, EA 15 MIN: Performed by: ORTHOPAEDIC SURGERY

## 2019-11-20 PROCEDURE — 27810169 ZZH OR IMPLANT GENERAL: Performed by: ORTHOPAEDIC SURGERY

## 2019-11-20 PROCEDURE — 25800030 ZZH RX IP 258 OP 636: Performed by: REGISTERED NURSE

## 2019-11-20 PROCEDURE — 25000128 H RX IP 250 OP 636: Performed by: ANESTHESIOLOGY

## 2019-11-20 PROCEDURE — 40000170 ZZH STATISTIC PRE-PROCEDURE ASSESSMENT II: Performed by: ORTHOPAEDIC SURGERY

## 2019-11-20 PROCEDURE — 25800025 ZZH RX 258: Performed by: ORTHOPAEDIC SURGERY

## 2019-11-20 PROCEDURE — 84132 ASSAY OF SERUM POTASSIUM: CPT | Performed by: ANESTHESIOLOGY

## 2019-11-20 PROCEDURE — 36000063 ZZH SURGERY LEVEL 4 EA 15 ADDTL MIN: Performed by: ORTHOPAEDIC SURGERY

## 2019-11-20 PROCEDURE — 37000009 ZZH ANESTHESIA TECHNICAL FEE, EACH ADDTL 15 MIN: Performed by: ORTHOPAEDIC SURGERY

## 2019-11-20 PROCEDURE — 25000125 ZZHC RX 250: Performed by: REGISTERED NURSE

## 2019-11-20 PROCEDURE — 0RRK0JZ REPLACEMENT OF LEFT SHOULDER JOINT WITH SYNTHETIC SUBSTITUTE, OPEN APPROACH: ICD-10-PCS | Performed by: ORTHOPAEDIC SURGERY

## 2019-11-20 PROCEDURE — C1776 JOINT DEVICE (IMPLANTABLE): HCPCS | Performed by: ORTHOPAEDIC SURGERY

## 2019-11-20 PROCEDURE — 37000008 ZZH ANESTHESIA TECHNICAL FEE, 1ST 30 MIN: Performed by: ORTHOPAEDIC SURGERY

## 2019-11-20 PROCEDURE — 25000128 H RX IP 250 OP 636: Performed by: REGISTERED NURSE

## 2019-11-20 PROCEDURE — 36415 COLL VENOUS BLD VENIPUNCTURE: CPT | Performed by: ANESTHESIOLOGY

## 2019-11-20 PROCEDURE — 25000132 ZZH RX MED GY IP 250 OP 250 PS 637: Performed by: REGISTERED NURSE

## 2019-11-20 PROCEDURE — 40000985 XR SHOULDER LT PORT G/E 2 VW: Mod: LT

## 2019-11-20 PROCEDURE — 36000093 ZZH SURGERY LEVEL 4 1ST 30 MIN: Performed by: ORTHOPAEDIC SURGERY

## 2019-11-20 PROCEDURE — 27210794 ZZH OR GENERAL SUPPLY STERILE: Performed by: ORTHOPAEDIC SURGERY

## 2019-11-20 PROCEDURE — 71000012 ZZH RECOVERY PHASE 1 LEVEL 1 FIRST HR: Performed by: ORTHOPAEDIC SURGERY

## 2019-11-20 PROCEDURE — 25800030 ZZH RX IP 258 OP 636: Performed by: ORTHOPAEDIC SURGERY

## 2019-11-20 PROCEDURE — 12000000 ZZH R&B MED SURG/OB

## 2019-11-20 DEVICE — BONE CEMENT SIMPLEX FULL DOSE 6191-1-001: Type: IMPLANTABLE DEVICE | Site: SHOULDER | Status: FUNCTIONAL

## 2019-11-20 DEVICE — IMPLANTABLE DEVICE: Type: IMPLANTABLE DEVICE | Site: SHOULDER | Status: FUNCTIONAL

## 2019-11-20 RX ORDER — FENTANYL CITRATE 50 UG/ML
50 INJECTION, SOLUTION INTRAMUSCULAR; INTRAVENOUS EVERY 5 MIN PRN
Status: DISCONTINUED | OUTPATIENT
Start: 2019-11-20 | End: 2019-11-20 | Stop reason: HOSPADM

## 2019-11-20 RX ORDER — AMOXICILLIN 250 MG
1 CAPSULE ORAL 2 TIMES DAILY
Status: DISCONTINUED | OUTPATIENT
Start: 2019-11-20 | End: 2019-11-21 | Stop reason: HOSPADM

## 2019-11-20 RX ORDER — ONDANSETRON 4 MG/1
4 TABLET, ORALLY DISINTEGRATING ORAL EVERY 6 HOURS PRN
Status: DISCONTINUED | OUTPATIENT
Start: 2019-11-20 | End: 2019-11-21 | Stop reason: HOSPADM

## 2019-11-20 RX ORDER — AMOXICILLIN 250 MG
2 CAPSULE ORAL 2 TIMES DAILY
Status: DISCONTINUED | OUTPATIENT
Start: 2019-11-20 | End: 2019-11-21 | Stop reason: HOSPADM

## 2019-11-20 RX ORDER — KETOROLAC TROMETHAMINE 15 MG/ML
15 INJECTION, SOLUTION INTRAMUSCULAR; INTRAVENOUS EVERY 6 HOURS
Status: DISCONTINUED | OUTPATIENT
Start: 2019-11-20 | End: 2019-11-21

## 2019-11-20 RX ORDER — PROPOFOL 10 MG/ML
INJECTION, EMULSION INTRAVENOUS PRN
Status: DISCONTINUED | OUTPATIENT
Start: 2019-11-20 | End: 2019-11-20

## 2019-11-20 RX ORDER — BUPIVACAINE HYDROCHLORIDE AND EPINEPHRINE 2.5; 5 MG/ML; UG/ML
INJECTION, SOLUTION EPIDURAL; INFILTRATION; INTRACAUDAL; PERINEURAL PRN
Status: DISCONTINUED | OUTPATIENT
Start: 2019-11-20 | End: 2019-11-20 | Stop reason: HOSPADM

## 2019-11-20 RX ORDER — CEFAZOLIN SODIUM 1 G/3ML
1 INJECTION, POWDER, FOR SOLUTION INTRAMUSCULAR; INTRAVENOUS SEE ADMIN INSTRUCTIONS
Status: DISCONTINUED | OUTPATIENT
Start: 2019-11-20 | End: 2019-11-20 | Stop reason: HOSPADM

## 2019-11-20 RX ORDER — ALBUTEROL SULFATE 90 UG/1
AEROSOL, METERED RESPIRATORY (INHALATION) PRN
Status: DISCONTINUED | OUTPATIENT
Start: 2019-11-20 | End: 2019-11-20

## 2019-11-20 RX ORDER — ONDANSETRON 2 MG/ML
4 INJECTION INTRAMUSCULAR; INTRAVENOUS EVERY 6 HOURS PRN
Status: DISCONTINUED | OUTPATIENT
Start: 2019-11-20 | End: 2019-11-21 | Stop reason: HOSPADM

## 2019-11-20 RX ORDER — HYDROXYZINE HYDROCHLORIDE 10 MG/1
10 TABLET, FILM COATED ORAL EVERY 6 HOURS PRN
Status: DISCONTINUED | OUTPATIENT
Start: 2019-11-20 | End: 2019-11-21 | Stop reason: HOSPADM

## 2019-11-20 RX ORDER — PROCHLORPERAZINE MALEATE 5 MG
5 TABLET ORAL EVERY 6 HOURS PRN
Status: DISCONTINUED | OUTPATIENT
Start: 2019-11-20 | End: 2019-11-21 | Stop reason: HOSPADM

## 2019-11-20 RX ORDER — OXYCODONE HYDROCHLORIDE 5 MG/1
5-10 TABLET ORAL EVERY 4 HOURS PRN
Status: DISCONTINUED | OUTPATIENT
Start: 2019-11-20 | End: 2019-11-21 | Stop reason: HOSPADM

## 2019-11-20 RX ORDER — FENTANYL CITRATE 50 UG/ML
INJECTION, SOLUTION INTRAMUSCULAR; INTRAVENOUS PRN
Status: DISCONTINUED | OUTPATIENT
Start: 2019-11-20 | End: 2019-11-20

## 2019-11-20 RX ORDER — ACETAMINOPHEN 500 MG
1000 TABLET ORAL EVERY 6 HOURS PRN
Status: ON HOLD | COMMUNITY
End: 2020-08-26

## 2019-11-20 RX ORDER — FENTANYL CITRATE 50 UG/ML
25-50 INJECTION, SOLUTION INTRAMUSCULAR; INTRAVENOUS
Status: DISCONTINUED | OUTPATIENT
Start: 2019-11-20 | End: 2019-11-20 | Stop reason: HOSPADM

## 2019-11-20 RX ORDER — HYDROMORPHONE HYDROCHLORIDE 1 MG/ML
.3-.5 INJECTION, SOLUTION INTRAMUSCULAR; INTRAVENOUS; SUBCUTANEOUS
Status: DISCONTINUED | OUTPATIENT
Start: 2019-11-20 | End: 2019-11-21

## 2019-11-20 RX ORDER — ONDANSETRON 2 MG/ML
INJECTION INTRAMUSCULAR; INTRAVENOUS PRN
Status: DISCONTINUED | OUTPATIENT
Start: 2019-11-20 | End: 2019-11-20

## 2019-11-20 RX ORDER — SODIUM CHLORIDE, SODIUM LACTATE, POTASSIUM CHLORIDE, CALCIUM CHLORIDE 600; 310; 30; 20 MG/100ML; MG/100ML; MG/100ML; MG/100ML
INJECTION, SOLUTION INTRAVENOUS CONTINUOUS
Status: DISCONTINUED | OUTPATIENT
Start: 2019-11-20 | End: 2019-11-20 | Stop reason: HOSPADM

## 2019-11-20 RX ORDER — LIDOCAINE HYDROCHLORIDE 20 MG/ML
INJECTION, SOLUTION INFILTRATION; PERINEURAL PRN
Status: DISCONTINUED | OUTPATIENT
Start: 2019-11-20 | End: 2019-11-20

## 2019-11-20 RX ORDER — CEFAZOLIN SODIUM 2 G/100ML
2 INJECTION, SOLUTION INTRAVENOUS
Status: COMPLETED | OUTPATIENT
Start: 2019-11-20 | End: 2019-11-20

## 2019-11-20 RX ORDER — EPHEDRINE SULFATE 50 MG/ML
INJECTION, SOLUTION INTRAMUSCULAR; INTRAVENOUS; SUBCUTANEOUS PRN
Status: DISCONTINUED | OUTPATIENT
Start: 2019-11-20 | End: 2019-11-20

## 2019-11-20 RX ORDER — NEOSTIGMINE METHYLSULFATE 1 MG/ML
VIAL (ML) INJECTION PRN
Status: DISCONTINUED | OUTPATIENT
Start: 2019-11-20 | End: 2019-11-20

## 2019-11-20 RX ORDER — NALOXONE HYDROCHLORIDE 0.4 MG/ML
.1-.4 INJECTION, SOLUTION INTRAMUSCULAR; INTRAVENOUS; SUBCUTANEOUS
Status: DISCONTINUED | OUTPATIENT
Start: 2019-11-20 | End: 2019-11-20

## 2019-11-20 RX ORDER — CEFAZOLIN SODIUM 1 G/3ML
INJECTION, POWDER, FOR SOLUTION INTRAMUSCULAR; INTRAVENOUS PRN
Status: DISCONTINUED | OUTPATIENT
Start: 2019-11-20 | End: 2019-11-20

## 2019-11-20 RX ORDER — LIDOCAINE 40 MG/G
CREAM TOPICAL
Status: DISCONTINUED | OUTPATIENT
Start: 2019-11-20 | End: 2019-11-21 | Stop reason: HOSPADM

## 2019-11-20 RX ORDER — CEFAZOLIN SODIUM 2 G/100ML
2 INJECTION, SOLUTION INTRAVENOUS EVERY 8 HOURS
Status: COMPLETED | OUTPATIENT
Start: 2019-11-20 | End: 2019-11-21

## 2019-11-20 RX ORDER — ONDANSETRON 4 MG/1
4 TABLET, ORALLY DISINTEGRATING ORAL EVERY 30 MIN PRN
Status: DISCONTINUED | OUTPATIENT
Start: 2019-11-20 | End: 2019-11-20 | Stop reason: HOSPADM

## 2019-11-20 RX ORDER — ACETAMINOPHEN 325 MG/1
975 TABLET ORAL EVERY 8 HOURS
Status: DISCONTINUED | OUTPATIENT
Start: 2019-11-20 | End: 2019-11-21 | Stop reason: HOSPADM

## 2019-11-20 RX ORDER — HYDROMORPHONE HYDROCHLORIDE 1 MG/ML
.3-.5 INJECTION, SOLUTION INTRAMUSCULAR; INTRAVENOUS; SUBCUTANEOUS EVERY 5 MIN PRN
Status: DISCONTINUED | OUTPATIENT
Start: 2019-11-20 | End: 2019-11-20 | Stop reason: HOSPADM

## 2019-11-20 RX ORDER — ACETAMINOPHEN 325 MG/1
650 TABLET ORAL EVERY 4 HOURS PRN
Status: DISCONTINUED | OUTPATIENT
Start: 2019-11-23 | End: 2019-11-21 | Stop reason: HOSPADM

## 2019-11-20 RX ORDER — GLYCOPYRROLATE 0.2 MG/ML
INJECTION, SOLUTION INTRAMUSCULAR; INTRAVENOUS PRN
Status: DISCONTINUED | OUTPATIENT
Start: 2019-11-20 | End: 2019-11-20

## 2019-11-20 RX ORDER — ONDANSETRON 2 MG/ML
4 INJECTION INTRAMUSCULAR; INTRAVENOUS EVERY 30 MIN PRN
Status: DISCONTINUED | OUTPATIENT
Start: 2019-11-20 | End: 2019-11-20 | Stop reason: HOSPADM

## 2019-11-20 RX ORDER — NALOXONE HYDROCHLORIDE 0.4 MG/ML
.1-.4 INJECTION, SOLUTION INTRAMUSCULAR; INTRAVENOUS; SUBCUTANEOUS
Status: DISCONTINUED | OUTPATIENT
Start: 2019-11-20 | End: 2019-11-21 | Stop reason: HOSPADM

## 2019-11-20 RX ORDER — SODIUM CHLORIDE 9 MG/ML
INJECTION, SOLUTION INTRAVENOUS CONTINUOUS
Status: DISCONTINUED | OUTPATIENT
Start: 2019-11-20 | End: 2019-11-21 | Stop reason: HOSPADM

## 2019-11-20 RX ORDER — ALBUTEROL SULFATE 90 UG/1
2 AEROSOL, METERED RESPIRATORY (INHALATION) DAILY PRN
COMMUNITY

## 2019-11-20 RX ADMIN — ROCURONIUM BROMIDE 20 MG: 10 INJECTION INTRAVENOUS at 08:12

## 2019-11-20 RX ADMIN — Medication 5 MG: at 10:07

## 2019-11-20 RX ADMIN — DEXMEDETOMIDINE HYDROCHLORIDE 0.2 MCG/KG/HR: 100 INJECTION, SOLUTION INTRAVENOUS at 07:45

## 2019-11-20 RX ADMIN — KETOROLAC TROMETHAMINE 15 MG: 15 INJECTION, SOLUTION INTRAMUSCULAR; INTRAVENOUS at 14:02

## 2019-11-20 RX ADMIN — ROCURONIUM BROMIDE 50 MG: 10 INJECTION INTRAVENOUS at 07:36

## 2019-11-20 RX ADMIN — FENTANYL CITRATE 50 MCG: 50 INJECTION, SOLUTION INTRAMUSCULAR; INTRAVENOUS at 08:07

## 2019-11-20 RX ADMIN — FENTANYL CITRATE 50 MCG: 50 INJECTION, SOLUTION INTRAMUSCULAR; INTRAVENOUS at 07:16

## 2019-11-20 RX ADMIN — PHENYLEPHRINE HYDROCHLORIDE 100 MCG: 10 INJECTION INTRAVENOUS at 09:40

## 2019-11-20 RX ADMIN — CEFAZOLIN 1 G: 1 INJECTION, POWDER, FOR SOLUTION INTRAMUSCULAR; INTRAVENOUS at 10:02

## 2019-11-20 RX ADMIN — HYDROMORPHONE HYDROCHLORIDE 0.5 MG: 1 INJECTION, SOLUTION INTRAMUSCULAR; INTRAVENOUS; SUBCUTANEOUS at 16:54

## 2019-11-20 RX ADMIN — Medication 5 MG: at 09:10

## 2019-11-20 RX ADMIN — PHENYLEPHRINE HYDROCHLORIDE 100 MCG: 10 INJECTION INTRAVENOUS at 09:07

## 2019-11-20 RX ADMIN — SODIUM CHLORIDE, POTASSIUM CHLORIDE, SODIUM LACTATE AND CALCIUM CHLORIDE: 600; 310; 30; 20 INJECTION, SOLUTION INTRAVENOUS at 07:15

## 2019-11-20 RX ADMIN — PHENYLEPHRINE HYDROCHLORIDE 200 MCG: 10 INJECTION INTRAVENOUS at 10:34

## 2019-11-20 RX ADMIN — CEFAZOLIN SODIUM 2 G: 2 INJECTION, SOLUTION INTRAVENOUS at 08:02

## 2019-11-20 RX ADMIN — ROPIVACAINE HYDROCHLORIDE 7 ML GIVEN: 5 INJECTION, SOLUTION EPIDURAL; INFILTRATION; PERINEURAL at 07:10

## 2019-11-20 RX ADMIN — NEOSTIGMINE METHYLSULFATE 5 MG: 1 INJECTION, SOLUTION INTRAVENOUS at 10:34

## 2019-11-20 RX ADMIN — SODIUM CHLORIDE: 9 INJECTION, SOLUTION INTRAVENOUS at 12:50

## 2019-11-20 RX ADMIN — ROCURONIUM BROMIDE 10 MG: 10 INJECTION INTRAVENOUS at 09:45

## 2019-11-20 RX ADMIN — ROCURONIUM BROMIDE 10 MG: 10 INJECTION INTRAVENOUS at 09:17

## 2019-11-20 RX ADMIN — ONDANSETRON 4 MG: 2 INJECTION INTRAMUSCULAR; INTRAVENOUS at 10:15

## 2019-11-20 RX ADMIN — ALBUTEROL SULFATE 2 PUFF: 90 AEROSOL, METERED RESPIRATORY (INHALATION) at 07:34

## 2019-11-20 RX ADMIN — ROCURONIUM BROMIDE 10 MG: 10 INJECTION INTRAVENOUS at 08:52

## 2019-11-20 RX ADMIN — PHENYLEPHRINE HYDROCHLORIDE 100 MCG: 10 INJECTION INTRAVENOUS at 10:07

## 2019-11-20 RX ADMIN — Medication 5 MG: at 08:35

## 2019-11-20 RX ADMIN — PHENYLEPHRINE HYDROCHLORIDE 100 MCG: 10 INJECTION INTRAVENOUS at 10:01

## 2019-11-20 RX ADMIN — CEFAZOLIN SODIUM 2 G: 2 INJECTION, SOLUTION INTRAVENOUS at 17:00

## 2019-11-20 RX ADMIN — Medication 5 MG: at 08:38

## 2019-11-20 RX ADMIN — PHENYLEPHRINE HYDROCHLORIDE 100 MCG: 10 INJECTION INTRAVENOUS at 10:19

## 2019-11-20 RX ADMIN — Medication 5 MG: at 10:33

## 2019-11-20 RX ADMIN — GLYCOPYRROLATE 1 MG: 0.2 INJECTION, SOLUTION INTRAMUSCULAR; INTRAVENOUS at 10:34

## 2019-11-20 RX ADMIN — ACETAMINOPHEN 975 MG: 325 TABLET, FILM COATED ORAL at 22:45

## 2019-11-20 RX ADMIN — KETOROLAC TROMETHAMINE 15 MG: 15 INJECTION, SOLUTION INTRAMUSCULAR; INTRAVENOUS at 20:33

## 2019-11-20 RX ADMIN — PHENYLEPHRINE HYDROCHLORIDE 50 MCG: 10 INJECTION INTRAVENOUS at 09:04

## 2019-11-20 RX ADMIN — SENNOSIDES AND DOCUSATE SODIUM 2 TABLET: 8.6; 5 TABLET ORAL at 20:34

## 2019-11-20 RX ADMIN — HYDROMORPHONE HYDROCHLORIDE 0.3 MG: 1 INJECTION, SOLUTION INTRAMUSCULAR; INTRAVENOUS; SUBCUTANEOUS at 09:48

## 2019-11-20 RX ADMIN — PROPOFOL 150 MG: 10 INJECTION, EMULSION INTRAVENOUS at 07:40

## 2019-11-20 RX ADMIN — ALBUTEROL SULFATE 6 PUFF: 90 AEROSOL, METERED RESPIRATORY (INHALATION) at 10:52

## 2019-11-20 RX ADMIN — PHENYLEPHRINE HYDROCHLORIDE 50 MCG: 10 INJECTION INTRAVENOUS at 09:10

## 2019-11-20 RX ADMIN — HYDROMORPHONE HYDROCHLORIDE 0.2 MG: 1 INJECTION, SOLUTION INTRAMUSCULAR; INTRAVENOUS; SUBCUTANEOUS at 10:14

## 2019-11-20 RX ADMIN — MIDAZOLAM 1 MG: 1 INJECTION INTRAMUSCULAR; INTRAVENOUS at 07:17

## 2019-11-20 RX ADMIN — PHENYLEPHRINE HYDROCHLORIDE 100 MCG: 10 INJECTION INTRAVENOUS at 10:04

## 2019-11-20 RX ADMIN — PHENYLEPHRINE HYDROCHLORIDE 200 MCG: 10 INJECTION INTRAVENOUS at 10:29

## 2019-11-20 RX ADMIN — FENTANYL CITRATE 50 MCG: 50 INJECTION, SOLUTION INTRAMUSCULAR; INTRAVENOUS at 07:36

## 2019-11-20 RX ADMIN — FENTANYL CITRATE 50 MCG: 50 INJECTION, SOLUTION INTRAMUSCULAR; INTRAVENOUS at 09:18

## 2019-11-20 RX ADMIN — SODIUM CHLORIDE, POTASSIUM CHLORIDE, SODIUM LACTATE AND CALCIUM CHLORIDE: 600; 310; 30; 20 INJECTION, SOLUTION INTRAVENOUS at 08:21

## 2019-11-20 RX ADMIN — LIDOCAINE HYDROCHLORIDE 100 MG: 20 INJECTION, SOLUTION INFILTRATION; PERINEURAL at 07:36

## 2019-11-20 ASSESSMENT — MIFFLIN-ST. JEOR: SCORE: 1639.92

## 2019-11-20 ASSESSMENT — ACTIVITIES OF DAILY LIVING (ADL)
ADLS_ACUITY_SCORE: 13
ADLS_ACUITY_SCORE: 11

## 2019-11-20 ASSESSMENT — COPD QUESTIONNAIRES: COPD: 1

## 2019-11-20 ASSESSMENT — LIFESTYLE VARIABLES: TOBACCO_USE: 1

## 2019-11-20 NOTE — ANESTHESIA POSTPROCEDURE EVALUATION
Patient: Helga Geronimo    Procedure(s):  LEFT TOTAL SHOULDER ARTHROPLASTY    Diagnosis:Osteoarthritis of left shoulder, unspecified osteoarthritis type [M19.012]  Diagnosis Additional Information: No value filed.    Anesthesia Type:  General, ETT    Note:  Anesthesia Post Evaluation    Patient location during evaluation: PACU  Patient participation: Able to fully participate in evaluation  Level of consciousness: awake and alert  Pain management: adequate  Airway patency: patent  Cardiovascular status: acceptable  Respiratory status: acceptable  Hydration status: acceptable  PONV: none     Anesthetic complications: None          Last vitals:  Vitals:    11/20/19 1500 11/20/19 1537 11/20/19 1600   BP: 115/66 114/71 120/68   Pulse:      Resp:   16   Temp:  36.7  C (98.1  F)    SpO2:   97%         Electronically Signed By: Vitor Vickers MD  November 20, 2019  4:46 PM

## 2019-11-20 NOTE — BRIEF OP NOTE
[facility]    Brief Operative Note    Pre-operative diagnosis: Osteoarthritis of left shoulder, unspecified osteoarthritis type [M19.012]  Post-operative diagnosis Same as pre-operative diagnosis    Procedure: Procedure(s):  LEFT TOTAL SHOULDER ARTHROPLASTY (TORNIER)^  Surgeon: Surgeon(s) and Role:     * Shola Yu MD - Primary  gregoria Adam  Anesthesia: General   Estimated blood loss: Less than 100 ml  Drains: Hemovac  Specimens: * No specimens in log *  Findings:   None.  Complications: None.  Implants: * No implants in log *

## 2019-11-20 NOTE — ANESTHESIA PREPROCEDURE EVALUATION
Anesthesia Pre-Procedure Evaluation    Patient: Helga Geronimo   MRN: [id number] : 1950          Preoperative Diagnosis: Osteoarthritis of left shoulder, unspecified osteoarthritis type [M19.012]    Procedure(s):  LEFT TOTAL SHOULDER ARTHROPLASTY (TORNIER)^    Past Medical History:   Diagnosis Date     Anxiety      Back pain, chronic      Depressive disorder      Hypertension      Past Surgical History:   Procedure Laterality Date     bilat hip replacements        HYSTERECTOMY       JOINT REPLACEMENT         Anesthesia Evaluation     .             ROS/MED HX    ENT/Pulmonary:     (+)DUANE risk factors snores loudly, hypertension, obese, tobacco use, Current use asthma COPD, , . .   (-) sleep apnea   Neurologic:      (-) Neuropathy   Cardiovascular:         METS/Exercise Tolerance:     Hematologic:         Musculoskeletal:   (+) arthritis,  -       GI/Hepatic:        (-) GERD   Renal/Genitourinary:         Endo:         Psychiatric:     (+) psychiatric history anxiety and depression      Infectious Disease:         Malignancy:         Other:                          Physical Exam  Normal systems: cardiovascular, pulmonary and dental    Airway   Mallampati: III  TM distance: >3 FB  Neck ROM: full    Dental     Cardiovascular       Pulmonary             Lab Results   Component Value Date    WBC 6.3 2017    HGB 12.0 2017    HCT 35.3 2017     2017     2017    POTASSIUM 3.7 2019    CHLORIDE 105 2017    CO2 29 2017    BUN 15 2017    CR 0.98 2019     (H) 2017    BRAYAN 8.6 2017    ALBUMIN 3.5 2017    PROTTOTAL 6.9 2017    ALT 20 2017    AST 13 2017    ALKPHOS 65 2017    BILITOTAL 0.4 2017    LIPASE 34 2009    PTT 30 2009    INR 1.25 (H) 12/10/2009       Preop Vitals  BP Readings from Last 3 Encounters:   19 126/72   17 123/66   16 118/72    Pulse Readings from Last  "3 Encounters:   11/20/19 92   05/02/17 81   06/17/16 88      Resp Readings from Last 3 Encounters:   11/20/19 18   05/02/17 16   06/17/16 20    SpO2 Readings from Last 3 Encounters:   11/20/19 92%   05/02/17 97%   06/17/16 98%      Temp Readings from Last 1 Encounters:   11/20/19 35.9  C (96.7  F) (Temporal)    Ht Readings from Last 1 Encounters:   11/20/19 1.626 m (5' 4\")      Wt Readings from Last 1 Encounters:   11/20/19 112.5 kg (248 lb)    Estimated body mass index is 42.57 kg/m  as calculated from the following:    Height as of this encounter: 1.626 m (5' 4\").    Weight as of this encounter: 112.5 kg (248 lb).       Anesthesia Plan      History & Physical Review  History and physical reviewed and following examination; no interval change.    ASA Status:  3 .    NPO Status:  > 8 hours    Plan for General and ETT with Intravenous and Propofol induction. Maintenance will be Inhalation.    PONV prophylaxis:  Ondansetron (or other 5HT-3)  Additional equipment: Videolaryngoscope precedex gtt      Postoperative Care  Postoperative pain management:  Multi-modal analgesia.      Consents  Anesthetic plan, risks, benefits and alternatives discussed with:  Patient..                 Vitor Vickers MD  "

## 2019-11-20 NOTE — OR NURSING
[facility] VS & surgical sites are stable; pt remains pain-free.  [facility] for [facility]

## 2019-11-20 NOTE — ANESTHESIA PROCEDURE NOTES
Peripheral nerve/Neuraxial procedure note : Interscalene  Pre-Procedure  Performed by Vitor Vickers MD  Location: pre-op      Pre-Anesthestic Checklist: patient identified, IV checked, site marked, risks and benefits discussed, informed consent, monitors and equipment checked, pre-op evaluation, at physician/surgeon's request and post-op pain management    Timeout  Correct Patient: Yes   Correct Procedure: Yes   Correct Site: Yes   Correct Laterality: Yes   Correct Position: Yes   Site Marked: Yes   .   Procedure Documentation    .    Procedure: Interscalene, left.   Patient Position:supine Local skin infiltrated with 33 mL of 1% lidocaine.    Ultrasound used to identify targeted nerve, plexus, or vascular marker and placed a needle adjacent to it., Ultrasound was used to visualize the spread of the anesthetic in close proximity to the above stated nerve. A permanent image is entered into the patient's record.  Patient Prep/Sterile Barriers; mask, sterile gloves, chlorhexidine gluconate and isopropyl alcohol, patient draped.  .  Needle: insulated  Insertion Method: Single Shot.        Assessment/Narrative  Paresthesias: No.  Injection made incrementally with aspirations every 5 mL..  The placement was negative for: blood aspirated, painful injection and site bleeding.  Bolus given via needle..   Secured via.   Complications: none. Comments:  Selective anterior approach to the suprascapular nerve    Ultrasound Interpretation, peripheral nerve block    1.  Under ultrasound guidance, a 22 gauge needle was inserted and placed in close proximity to the anterior suprascapular nerve.  2. Ultrasound was also used to visualize the spread of the anesthetic in close proximity to the nerve being blocked.  3. The nerve appeared anatomically normal.  4. There were no apparent abnormal pathological findings.  5. A permanent ultrasound image was saved n the patient's record.    Vitor Vickers MD   7:15 AM

## 2019-11-20 NOTE — ANESTHESIA CARE TRANSFER NOTE
Patient: Helga Geronimo    Procedure(s):  LEFT TOTAL SHOULDER ARTHROPLASTY    Diagnosis: Osteoarthritis of left shoulder, unspecified osteoarthritis type [M19.012]  Diagnosis Additional Information: No value filed.    Anesthesia Type:   General, ETT     Note:  Airway :Face Mask  Patient transferred to:PACU  Comments: Transferred to PACU, spontaneous respirations, 10L oxygen via facemask.  All monitors and alarms on and functioning, VSS.  Patient awake, comfortable.  Report to PACU RN.Handoff Report: Identifed the Patient, Identified the Reponsible Provider, Reviewed the pertinent medical history, Discussed the surgical course, Reviewed Intra-OP anesthesia mangement and issues during anesthesia, Set expectations for post-procedure period and Allowed opportunity for questions and acknowledgement of understanding      Vitals: (Last set prior to Anesthesia Care Transfer)    CRNA VITALS  11/20/2019 1034 - 11/20/2019 1112      11/20/2019             Resp Rate (set):  10                Electronically Signed By: CHRIS Bassett CRNA  November 20, 2019  11:12 AM

## 2019-11-20 NOTE — PROGRESS NOTES
Admission medication history interview status for the 11/20/2019  admission is complete. See EPIC admission navigator for prior to admission medications     Medication history source reliability:Good    Medication history interview source(s):Patient    Medication history resources (including written lists, pill bottles, clinic record):None    Primary pharmacy.[facility], [place]    Additional medication history information not noted on PTA med list :None    Time spent in this activity: 30 minutes    Prior to Admission medications    Medication Sig Last Dose Taking? Auth Provider   acetaminophen (TYLENOL) 500 MG tablet Take 1,000 mg by mouth every 6 hours as needed for mild pain 11/18/2019 at prn Yes Reported, Patient   albuterol (PROAIR HFA/PROVENTIL HFA/VENTOLIN HFA) 108 (90 Base) MCG/ACT inhaler Inhale 2 puffs into the lungs daily as needed for shortness of breath / dyspnea or wheezing 11/20/2019 at 0430 Yes Reported, Patient   budesonide-formoterol (SYMBICORT) 80-4.5 MCG/ACT Inhaler Inhale 2 puffs into the lungs 2 times daily 11/20/2019 at 0430 Yes Reported, Patient   calcium-vitamin D-vitamin K (VIACTIV) 500-500-40 MG-UNT-MCG CHEW Take 1 tablet by mouth At Bedtime  11/19/2019 at hs Yes Reported, Patient   FLUoxetine (PROZAC) 20 MG capsule Take 40 mg by mouth 2 times daily (Take 2 X 20 mg = 40 mg dose) 11/20/2019 at 0430 Yes Reported, Patient   gabapentin (NEURONTIN) 300 MG capsule Take 900 mg by mouth 2 times daily (Take 3 X 300 mg = 900 mg dose)  (in addition to mid day dose) 11/20/2019 at 0430 Yes Reported, Patient   gabapentin (NEURONTIN) 300 MG capsule Take 1,200 mg by mouth daily (before lunch) (4 x 300mg = 1200mg)  (in addition to AM and PM doses) 11/19/2019 at 1200 Yes Reported, Patient   losartan-hydrochlorothiazide (HYZAAR) 100-12.5 MG tablet Take 1 tablet by mouth At Bedtime  11/20/2019 at 0430 Yes Reported, Patient   multivitamin, therapeutic (THERA-VIT) TABS tablet Take 1 tablet by mouth At  Bedtime  11/20/2019 at 0430 Yes Reported, Patient   risperiDONE (RISPERDAL) 0.5 MG tablet Take 0.5 mg by mouth daily (with lunch) (In addition to AM and PM dose) 11/19/2019 at 1200 Yes Reported, Patient   risperiDONE (RISPERDAL) 1 MG tablet Take 1 mg by mouth 2 times daily (In addition to mid day dose.) 11/20/2019 at 0430 Yes Reported, Patient   tiotropium (SPIRIVA RESPIMAT) 2.5 MCG/ACT inhaler Inhale 2 puffs into the lungs every evening  11/19/2019 at 1800 Yes Reported, Patient   traZODone (DESYREL) 100 MG tablet Take 50 mg by mouth At Bedtime (Take 0.5 X 100 mg = 50 mg dose) 11/19/2019 at hs Yes Reported, Patient

## 2019-11-20 NOTE — PLAN OF CARE
Pt A/Ox4, lethargic. VSS, O2 3L. Up 1 assist. Reports pain 6/10 using IV dilaudid with relief. Regular diet tolerated. CMS intact. Dressing c/d/I. Ultrasling placed by orthotics. Hemovac patent.

## 2019-11-20 NOTE — PROGRESS NOTES
"11/20/19, 2 PM, [place] [address] [id number], female, Height: 5' 4\", Weight: 248 lbs 0 oz, Ordered by: Dr. 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    Subjective:  - Patient was seen today with daughter present for the evaluation/fit/delivery of a Ultra Sling III shoulder orthosis large size (Ref # ) from [facility] on patient's left shoulder. Patient appears pleasant.   - Health History & Progression: Patient recently underwent a left total shoulder replacement and requires a post op brace. Upon my arrival the patient was in a standard sling with ACE wrap securing her left arm to the chest.  - Patient will utilize the ultra sling shoulder orthosis to achieve the following functional goals: Immobilizing the left humeral on acromion articulation for added stability.  - Patient is currently limited by: left shoulder pain.  Objective:  - Sensory: Patient's affected side was sensitive to movement and positioning for the brace.  - Appearance: Swelling on affected shoulder region compared to the contralateral side.  - Patient's UE is able to be moved and positioned for the appropriate fitting position today.    Assessment:  - Patient can benefit from the Ultra Sling orthosis because it will provide support of the shoulder on the chest through positioning with waist pillow and overall containment of the shoulder joint. The brace provides restriction in the affected side acromioclavicular and elbow joint set at 90 degrees flexion. Patient s ailment recovery is expected to be managed well with the utilization of Ultra Sling IV Shoulder orthosis.    - Patient was fit while in bed with the Ultra Sling so the patient has a shoulder immobilizer for recovery. The ACE wrap was cut and the standard sling was placed to the side.  - Upon fitting the circumferences of the brace presented satisfactory upon donning the " brace.  - Patient's daughter signed the delivery ticket and was given the [place] receipt information sheet.    Goal:   - A sling orthosis is functionally necessary to support the shoulder and distal UE. This brace was specifically designed for patients suffering from shoulder pain and dysfunction (including subluxation) due to neurological conditions such as: elbow and shoulder ailments but is able to treat alternative diagnoses.    P:  - Patient's daughter was given the verbal and written instructions on how to don/doff/care for the brace. Patient will utilize the orthosis for the duration of rehabilitation/PT in the hospital and at home activities upon discharge for the duration of treatment of patient ailment or until use of orthosis is no longer necessary. Will follow-up PRN..    Electronically signed by Filiberto Crum CPO, LPO, MSPO.

## 2019-11-21 ENCOUNTER — APPOINTMENT (OUTPATIENT)
Dept: OCCUPATIONAL THERAPY | Facility: CLINIC | Age: 69
DRG: 483 | End: 2019-11-21
Attending: ORTHOPAEDIC SURGERY
Payer: COMMERCIAL

## 2019-11-21 VITALS
DIASTOLIC BLOOD PRESSURE: 55 MMHG | HEART RATE: 90 BPM | HEIGHT: 64 IN | TEMPERATURE: 98 F | OXYGEN SATURATION: 90 % | BODY MASS INDEX: 42.34 KG/M2 | WEIGHT: 248 LBS | RESPIRATION RATE: 16 BRPM | SYSTOLIC BLOOD PRESSURE: 105 MMHG

## 2019-11-21 LAB
ANION GAP SERPL CALCULATED.3IONS-SCNC: 1 MMOL/L (ref 3–14)
BASOPHILS # BLD AUTO: 0 10E9/L (ref 0–0.2)
BASOPHILS NFR BLD AUTO: 0.2 %
BUN SERPL-MCNC: 16 MG/DL (ref 7–30)
CALCIUM SERPL-MCNC: 7.5 MG/DL (ref 8.5–10.1)
CHLORIDE SERPL-SCNC: 107 MMOL/L (ref 94–109)
CO2 SERPL-SCNC: 31 MMOL/L (ref 20–32)
CREAT SERPL-MCNC: 0.97 MG/DL (ref 0.52–1.04)
DIFFERENTIAL METHOD BLD: ABNORMAL
EOSINOPHIL # BLD AUTO: 0.1 10E9/L (ref 0–0.7)
EOSINOPHIL NFR BLD AUTO: 0.8 %
ERYTHROCYTE [DISTWIDTH] IN BLOOD BY AUTOMATED COUNT: 16.3 % (ref 10–15)
GFR SERPL CREATININE-BSD FRML MDRD: 59 ML/MIN/{1.73_M2}
GLUCOSE SERPL-MCNC: 115 MG/DL (ref 70–99)
HCT VFR BLD AUTO: 32.3 % (ref 35–47)
HGB BLD-MCNC: 10.2 G/DL (ref 11.7–15.7)
IMM GRANULOCYTES # BLD: 0 10E9/L (ref 0–0.4)
IMM GRANULOCYTES NFR BLD: 0.1 %
LYMPHOCYTES # BLD AUTO: 1.7 10E9/L (ref 0.8–5.3)
LYMPHOCYTES NFR BLD AUTO: 18.9 %
MCH RBC QN AUTO: 29.7 PG (ref 26.5–33)
MCHC RBC AUTO-ENTMCNC: 31.6 G/DL (ref 31.5–36.5)
MCV RBC AUTO: 94 FL (ref 78–100)
MONOCYTES # BLD AUTO: 1.2 10E9/L (ref 0–1.3)
MONOCYTES NFR BLD AUTO: 12.9 %
NEUTROPHILS # BLD AUTO: 6 10E9/L (ref 1.6–8.3)
NEUTROPHILS NFR BLD AUTO: 67.1 %
NRBC # BLD AUTO: 0 10*3/UL
NRBC BLD AUTO-RTO: 0 /100
PLATELET # BLD AUTO: 191 10E9/L (ref 150–450)
POTASSIUM SERPL-SCNC: 3.6 MMOL/L (ref 3.4–5.3)
RBC # BLD AUTO: 3.43 10E12/L (ref 3.8–5.2)
SODIUM SERPL-SCNC: 139 MMOL/L (ref 133–144)
WBC # BLD AUTO: 8.9 10E9/L (ref 4–11)

## 2019-11-21 PROCEDURE — 97535 SELF CARE MNGMENT TRAINING: CPT | Mod: GO

## 2019-11-21 PROCEDURE — 97110 THERAPEUTIC EXERCISES: CPT | Mod: GO

## 2019-11-21 PROCEDURE — 80048 BASIC METABOLIC PNL TOTAL CA: CPT | Performed by: ORTHOPAEDIC SURGERY

## 2019-11-21 PROCEDURE — 97530 THERAPEUTIC ACTIVITIES: CPT | Mod: GO

## 2019-11-21 PROCEDURE — 25000128 H RX IP 250 OP 636: Performed by: ORTHOPAEDIC SURGERY

## 2019-11-21 PROCEDURE — 85025 COMPLETE CBC W/AUTO DIFF WBC: CPT | Performed by: ORTHOPAEDIC SURGERY

## 2019-11-21 PROCEDURE — 25000132 ZZH RX MED GY IP 250 OP 250 PS 637: Performed by: ORTHOPAEDIC SURGERY

## 2019-11-21 PROCEDURE — 25800030 ZZH RX IP 258 OP 636: Performed by: ORTHOPAEDIC SURGERY

## 2019-11-21 PROCEDURE — 36415 COLL VENOUS BLD VENIPUNCTURE: CPT | Performed by: ORTHOPAEDIC SURGERY

## 2019-11-21 PROCEDURE — 97165 OT EVAL LOW COMPLEX 30 MIN: CPT | Mod: GO

## 2019-11-21 RX ORDER — OXYCODONE HYDROCHLORIDE 5 MG/1
5-10 TABLET ORAL EVERY 6 HOURS PRN
Qty: 60 TABLET | Refills: 0 | Status: SHIPPED | OUTPATIENT
Start: 2019-11-21 | End: 2020-08-25

## 2019-11-21 RX ORDER — ASPIRIN 325 MG
325 TABLET, DELAYED RELEASE (ENTERIC COATED) ORAL DAILY
Qty: 14 TABLET | Refills: 0 | Status: SHIPPED | OUTPATIENT
Start: 2019-11-21 | End: 2020-08-25

## 2019-11-21 RX ORDER — HYDROXYZINE HYDROCHLORIDE 10 MG/1
25 TABLET, FILM COATED ORAL EVERY 6 HOURS PRN
Qty: 30 TABLET | Refills: 0 | Status: SHIPPED | OUTPATIENT
Start: 2019-11-21 | End: 2020-08-25

## 2019-11-21 RX ORDER — AMOXICILLIN 250 MG
2 CAPSULE ORAL 2 TIMES DAILY
Qty: 40 TABLET | Refills: 0 | Status: SHIPPED | OUTPATIENT
Start: 2019-11-21 | End: 2020-08-25

## 2019-11-21 RX ORDER — KETOROLAC TROMETHAMINE 15 MG/ML
15 INJECTION, SOLUTION INTRAMUSCULAR; INTRAVENOUS EVERY 6 HOURS PRN
Status: DISCONTINUED | OUTPATIENT
Start: 2019-11-21 | End: 2019-11-21 | Stop reason: HOSPADM

## 2019-11-21 RX ORDER — CEFUROXIME AXETIL 250 MG/1
250 TABLET ORAL
Qty: 20 TABLET | Refills: 0 | Status: SHIPPED | OUTPATIENT
Start: 2019-11-22 | End: 2020-08-25

## 2019-11-21 RX ADMIN — KETOROLAC TROMETHAMINE 15 MG: 15 INJECTION, SOLUTION INTRAMUSCULAR; INTRAVENOUS at 02:40

## 2019-11-21 RX ADMIN — CEFAZOLIN SODIUM 2 G: 2 INJECTION, SOLUTION INTRAVENOUS at 03:03

## 2019-11-21 RX ADMIN — SODIUM CHLORIDE: 9 INJECTION, SOLUTION INTRAVENOUS at 01:56

## 2019-11-21 RX ADMIN — KETOROLAC TROMETHAMINE 15 MG: 15 INJECTION, SOLUTION INTRAMUSCULAR; INTRAVENOUS at 08:25

## 2019-11-21 RX ADMIN — ASPIRIN 325 MG: 325 TABLET, DELAYED RELEASE ORAL at 08:25

## 2019-11-21 RX ADMIN — SENNOSIDES AND DOCUSATE SODIUM 2 TABLET: 8.6; 5 TABLET ORAL at 08:25

## 2019-11-21 RX ADMIN — OXYCODONE HYDROCHLORIDE 10 MG: 5 TABLET ORAL at 07:17

## 2019-11-21 RX ADMIN — OXYCODONE HYDROCHLORIDE 5 MG: 5 TABLET ORAL at 02:40

## 2019-11-21 RX ADMIN — ACETAMINOPHEN 975 MG: 325 TABLET, FILM COATED ORAL at 07:17

## 2019-11-21 RX ADMIN — OXYCODONE HYDROCHLORIDE 10 MG: 5 TABLET ORAL at 11:19

## 2019-11-21 ASSESSMENT — ACTIVITIES OF DAILY LIVING (ADL)
ADLS_ACUITY_SCORE: 13
ADLS_ACUITY_SCORE: 13
ADLS_ACUITY_SCORE: 12
ADLS_ACUITY_SCORE: 13

## 2019-11-21 NOTE — PLAN OF CARE
Pt A/Ox4. VSS. Up 1 assist. Reports pain 6-8/10 using oxycodone with relief. Regular diet tolerated. CMS intact. Dressing changed and hemovac patent. TEDs applied. Discharge to home with daughter at 1430 using wheelchair. AVS and cost of medication reviewed with pt at discharge.

## 2019-11-21 NOTE — PROGRESS NOTES
Helga Geronimo  2019  POD #1    Doing well.  No immediate surgical complications identified.  Pain well-controlled.  Her  bp is  Labile  And staying  Low side  Temperatures:  Current - Temp: 98  F (36.7  C); Max - Temp  Av.7  F (37.1  C)  Min: 98  F (36.7  C)  Max: 99.9  F (37.7  C)  Pulse range: Pulse  Av.3  Min: 88  Max: 93  Blood pressure range: Systolic (24hrs), Av , Min:96 , Max:130   ; Diastolic (24hrs), Av, Min:55, Max:77    CMS:  intact  Labs:   Results for orders placed or performed during the hospital encounter of 19 (from the past 24 hour(s))   CBC with platelets differential   Result Value Ref Range    WBC 8.9 4.0 - 11.0 10e9/L    RBC Count 3.43 (L) 3.8 - 5.2 10e12/L    Hemoglobin 10.2 (L) 11.7 - 15.7 g/dL    Hematocrit 32.3 (L) 35.0 - 47.0 %    MCV 94 78 - 100 fl    MCH 29.7 26.5 - 33.0 pg    MCHC 31.6 31.5 - 36.5 g/dL    RDW 16.3 (H) 10.0 - 15.0 %    Platelet Count 191 150 - 450 10e9/L    Diff Method Automated Method     % Neutrophils 67.1 %    % Lymphocytes 18.9 %    % Monocytes 12.9 %    % Eosinophils 0.8 %    % Basophils 0.2 %    % Immature Granulocytes 0.1 %    Nucleated RBCs 0 0 /100    Absolute Neutrophil 6.0 1.6 - 8.3 10e9/L    Absolute Lymphocytes 1.7 0.8 - 5.3 10e9/L    Absolute Monocytes 1.2 0.0 - 1.3 10e9/L    Absolute Eosinophils 0.1 0.0 - 0.7 10e9/L    Absolute Basophils 0.0 0.0 - 0.2 10e9/L    Abs Immature Granulocytes 0.0 0 - 0.4 10e9/L    Absolute Nucleated RBC 0.0    Basic metabolic panel   Result Value Ref Range    Sodium 139 133 - 144 mmol/L    Potassium 3.6 3.4 - 5.3 mmol/L    Chloride 107 94 - 109 mmol/L    Carbon Dioxide 31 20 - 32 mmol/L    Anion Gap 1 (L) 3 - 14 mmol/L    Glucose 115 (H) 70 - 99 mg/dL    Urea Nitrogen 16 7 - 30 mg/dL    Creatinine 0.97 0.52 - 1.04 mg/dL    GFR Estimate 59 (L) >60 mL/min/[1.73_m2]    GFR Estimate If Black 68 >60 mL/min/[1.73_m2]    Calcium 7.5 (L) 8.5 - 10.1 mg/dL       PLAN:  Discharge plan: tirso with out pt  phy   Therapy.  Hold  bp meds  today

## 2019-11-21 NOTE — PLAN OF CARE
Patient plan: Home  Current status: OT orders rec'd, initial eval complete and treatment initiated. Pt admitted for L Reverse total shoulder. Pt lives alone in an apartment w/elevator access. 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 Pt Previously Ind w/I/ADL's.   Pt sup>sit mod I using bed rail and HOB elevated. STS CGA intially then progressed to SBA. In room ambulation w/CGA. Toilet transfer w/grab bars and raised seat CGA. G/h in stance at sink SBA, Mod A to don/doff sling, min A to zenaida/doff FB clothing. Provided pt w/HEP program and guided pt through PROM exercises, fatiguing after 8 ea.   Anticipated status at discharge: Pt will need min A assist from daughter for dressing, bathing, house keeping tasks, meal prep and driving.       Occupational Therapy Discharge Summary    Reason for therapy discharge:    Discharged to home.    Progress towards therapy goal(s). See goals on Care Plan in [facility] electronic health record for goal details.  Goals partially met.  Barriers to achieving goals:   discharge from facility.    Therapy recommendation(s):    Continue home exercise program.

## 2019-11-21 NOTE — PROGRESS NOTES
Ortho    Stop  Iv  Dilaudid,   toradol re ordered    Drain  Out this am  And dressing  Change    Plan discharge  Home  Later  Today  With  Out  Pt  pt

## 2019-11-21 NOTE — PLAN OF CARE
Discharge Planner PT   Patient plan for discharge: defer to OT notes  Current status: PT orders received, chart reviewed. Per OT patient ambulating without assistive device and no loss of balance noted. Lives in an apartment with elevator access. Patient here for a L total shoulder arthroplasty.   Barriers to return to prior living situation: defer to OT  Recommendations for discharge: defer to OT   Rationale for recommendations: No IP PT needs at this time. Will complete orders and signs off.        Entered by: Jessie Eaton 11/21/2019 12:10 PM

## 2019-11-21 NOTE — PROGRESS NOTES
11/21/19 0800   Quick Adds   Type of Visit Initial Occupational Therapy Evaluation   Living Environment   Lives With alone   Living Arrangements apartment   Home Accessibility no concerns  (elevator access)   Transportation Anticipated family or friend will provide   Living Environment Comment Pt lives alone in an apartment w/elevator access. 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 Daughter will be staying for a few days, then neighbors will help as needed.   Functional Level   Ambulation 0-->independent   Transferring 0-->independent   Toileting 0-->independent   Bathing 0-->independent  (tub shower, has chair, )   Dressing 0-->independent   Eating 0-->independent   Communication 0-->understands/communicates without difficulty   Cognition 0 - no cognition issues reported   Fall history within last six months yes   Number of times patient has fallen within last six months 1   Which of the above functional risks had a recent onset or change? dressing;toileting;bathing   Prior Functional Level Comment Pt previoulsy IND w/I/ADL's   General Information   Onset of Illness/Injury or Date of Surgery - Date 11/20/19   Referring Physician Shola Yu MD    Patient/Family Goals Statement Home   Additional Occupational Profile Info/Pertinent History of Current Problem L Rever total shoulder   Weight-Bearing Status - LUE nonweight-bearing   Cognitive Status Examination   Orientation orientation to person, place and time   Level of Consciousness alert   Visual Perception   Visual Perception Comments Reading only   Sensory Examination   Sensory Quick Adds No deficits were identified   Pain Assessment   Patient Currently in Pain Yes, see Vital Sign flowsheet   Range of Motion (ROM)   ROM Comment WNL in RUE, not assessed in LUE   Strength   Strength Comments WFL in RUE, not assessed in RUE   Coordination   Upper Extremity Coordination No deficits were identified   Transfer Skill: Sit to Stand   Level of  [place]: Sit/Stand contact guard   Physical Assist/Nonphysical Assist: Sit/Stand supervision;verbal cues   Transfer Skill: Sit to Stand other (see comments)  (NWB in LUE)   Transfer Skill: Toilet Transfer   Level of [place]: Toilet contact guard   Physical Assist/Nonphysical Assist: Toilet supervision;verbal cues   Weight-Bearing Restrictions: Toilet other (see comments)  (NWB in LUE)   Assistive Device grab bars;seat riser   Upper Body Dressing   Level of [place]: Dress Upper Body minimum assist (75% patients effort)   Physical Assist/Nonphysical Assist: Dress Upper Body 1 person assist;verbal cues;supervision   Lower Body Dressing   Level of [place]: Dress Lower Body minimum assist (75% patients effort)   Physical Assist/Nonphysical Assist: Dress Lower Body 1 person assist;supervision;verbal cues   Toileting   Level of [place]: Toilet minimum assist (75% patients effort)   Physical Assist/Nonphysical Assist: Toilet supervision;verbal cues;1 person assist   Grooming   Level of [place]: Grooming stand-by assist   Physical Assist/Nonphysical Assist: Grooming supervision   Eating/Self Feeding   Level of [place]: Eating independent   Instrumental Activities of Daily Living (IADL)   Previous Responsibilities work;driving;finances;meal prep;medication management;housekeeping;laundry;shopping   Activities of Daily Living Analysis   Impairments Contributing to Impaired Activities of Daily Living fear and anxiety;pain;post surgical precautions;strength decreased;ROM decreased   General Therapy Interventions   Planned Therapy Interventions ADL retraining;IADL retraining;ROM   Clinical Impression   Criteria for Skilled Therapeutic Interventions Met yes, treatment indicated   OT Diagnosis Decreased IND w/I/ADL   Influenced by the following impairments pain, NWB through LUE, anxiety,    Assessment of Occupational Performance 1-3 Performance Deficits   Identified Performance Deficits I/ALD  "  Clinical Decision Making (Complexity) Low complexity   Therapy Frequency Daily   Predicted Duration of Therapy Intervention (days/wks) 7   Anticipated Equipment Needs at Discharge reacher;bath sponge   Anticipated Discharge Disposition Home with Assist   Risks and Benefits of Treatment have been explained. Yes   Patient, Family & other staff in agreement with plan of care Yes   [facility] TM \"6 Clicks\"   2016, Trustees of [facility], under license to [employer].  All rights reserved.   6 Clicks Short Forms Daily Activity Inpatient Short Form   [facility]  \"6 Clicks\" Daily Activity Inpatient Short Form   1. Putting on and taking off regular lower body clothing? 3 - A Little   2. Bathing (including washing, rinsing, drying)? 3 - A Little   3. Toileting, which includes using toilet, bedpan or urinal? 3 - A Little   4. Putting on and taking off regular upper body clothing? 3 - A Little   5. Taking care of personal grooming such as brushing teeth? 4 - None   6. Eating meals? 4 - None   Daily Activity Raw Score (Score out of 24.Lower scores equate to lower levels of function) 20   Total Evaluation Time   Total Evaluation Time (Minutes) 8     "

## 2019-11-21 NOTE — PLAN OF CARE
.A/Ox 4. Ax 2 with gait belt and walker. VSS on 3L O2 Baseline Left Eye and Lip drop. CMS intact. Dressing C/D/I. Pain controlled with Oxycodone, Toradol and Tylenol. Tolerating Reg Diet. Progressing per POC. Will Cont to monitor.

## 2019-12-05 ENCOUNTER — THERAPY VISIT (OUTPATIENT)
Dept: PHYSICAL THERAPY | Facility: CLINIC | Age: 69
End: 2019-12-05
Payer: COMMERCIAL

## 2019-12-05 DIAGNOSIS — Z96.619 AFTERCARE FOLLOWING SHOULDER JOINT REPLACEMENT SURGERY, UNSPECIFIED LATERALITY: ICD-10-CM

## 2019-12-05 DIAGNOSIS — Z47.1 AFTERCARE FOLLOWING SHOULDER JOINT REPLACEMENT SURGERY, UNSPECIFIED LATERALITY: ICD-10-CM

## 2019-12-05 PROCEDURE — 97161 PT EVAL LOW COMPLEX 20 MIN: CPT | Mod: GP | Performed by: PHYSICAL THERAPIST

## 2019-12-05 PROCEDURE — 97110 THERAPEUTIC EXERCISES: CPT | Mod: GP | Performed by: PHYSICAL THERAPIST

## 2019-12-05 NOTE — DISCHARGE SUMMARY
[facility] Discharge Summary    Helga Geronimo MRN# [id number]   Age: 69 year old YOB: 1950     Date of Admission:  11/20/2019  Date of Discharge::  11/21/2019  2:19 PM  Admitting Physician:  Shola Yu MD  Discharge Physician:  Shola Yu MD     Home clinic:           Admission Diagnoses:   Osteoarthritis of left shoulder, unspecified osteoarthritis type [M19.012]          Discharge Diagnosis:   Osteoarthritis of left shoulder, unspecified osteoarthritis type [M19.012]          Procedures:   Procedure(s): Left  Total  Shoulder        No other significant procedures performed during this admission           Medications Prior to Admission:     No medications prior to admission.             Discharge Medications:     Discharge Medication List as of 11/21/2019  1:34 PM      START taking these medications    Details   aspirin (ASA) 325 MG EC tablet Take 1 tablet (325 mg) by mouth daily, Disp-14 tablet, R-0, Local Print      cefuroxime (CEFTIN) 250 MG tablet Take 1 tablet (250 mg) by mouth 2 times daily, Disp-20 tablet, R-0, Local Print      hydrOXYzine (ATARAX) 10 MG tablet Take 3 tablets (30 mg) by mouth every 6 hours as needed for itching, Disp-30 tablet, R-0, Local Print      oxyCODONE (ROXICODONE) 5 MG tablet Take 1-2 tablets (5-10 mg) by mouth every 6 hours as needed for severe pain, Disp-60 tablet, R-0, Local Print      senna-docusate (SENOKOT-S/PERICOLACE) 8.6-50 MG tablet Take 2 tablets by mouth 2 times daily, Disp-40 tablet, R-0, Local Print         CONTINUE these medications which have NOT CHANGED    Details   acetaminophen (TYLENOL) 500 MG tablet Take 1,000 mg by mouth every 6 hours as needed for mild pain, Historical      albuterol (PROAIR HFA/PROVENTIL HFA/VENTOLIN HFA) 108 (90 Base) MCG/ACT inhaler Inhale 2 puffs into the lungs daily as needed for shortness of breath / dyspnea or wheezing, HistoricalPharmacy may dispense brand covered by insurance (Proair, or proventil or  ventolin or generic albuterol inhaler)      budesonide-formoterol (SYMBICORT) 80-4.5 MCG/ACT Inhaler Inhale 2 puffs into the lungs 2 times daily, Historical      calcium-vitamin D-vitamin K (VIACTIV) 500-500-40 MG-UNT-MCG CHEW Take 1 tablet by mouth At Bedtime , Historical      FLUoxetine (PROZAC) 20 MG capsule Take 40 mg by mouth 2 times daily (Take 2 X 20 mg = 40 mg dose), Historical      !! gabapentin (NEURONTIN) 300 MG capsule Take 900 mg by mouth 2 times daily (Take 3 X 300 mg = 900 mg dose)  (in addition to mid day dose), Historical      !! gabapentin (NEURONTIN) 300 MG capsule Take 1,200 mg by mouth daily (before lunch) (4 x 300mg = 1200mg)  (in addition to AM and PM doses), Historical      losartan-hydrochlorothiazide (HYZAAR) 100-12.5 MG tablet Take 1 tablet by mouth At Bedtime , Historical      multivitamin, therapeutic (THERA-VIT) TABS tablet Take 1 tablet by mouth At Bedtime , Historical      !! risperiDONE (RISPERDAL) 0.5 MG tablet Take 0.5 mg by mouth daily (with lunch) (In addition to AM and PM dose), Historical      !! risperiDONE (RISPERDAL) 1 MG tablet Take 1 mg by mouth 2 times daily (In addition to mid day dose.), Historical      tiotropium (SPIRIVA RESPIMAT) 2.5 MCG/ACT inhaler Inhale 2 puffs into the lungs every evening , Historical      traZODone (DESYREL) 100 MG tablet Take 50 mg by mouth At Bedtime (Take 0.5 X 100 mg = 50 mg dose), Historical       !! - Potential duplicate medications found. Please discuss with provider.                Consultations:   medical          Brief History of Illness:   This patient was a 69 year old female with a known history of osteoarthritis and rheumatoid arthritis.  She underwent a period of non-operative treatment which only provided mild and intermittent relief.  After a lengthy discussion and consultation with Dr. puentes, the patient chose to undergo a left side shoulder arthroplasty.  She was explained the risks,complications, and benefits of the  procedure and elected to have the surgical procedure.  Patient was cleared by her primary care physician for joint replacement.           Hospital Course:   The patient tolerated the procedure well and was taken to postop recovery in stable condition.  Please refer to the full operative note for complete details.   In recovery, she had a post-operative hemoglobin of . and was noted to be motor and neurovascular intact.  Post-operative films show components in excellent position.     On post-operative day 1, patient's wound was checked and noted to be healing well..  The drain output was within normal limits..  Patient had adequate pain control and was prescribed physical therapy.  She was given 24 hrs of perioperative antibiotics.  Patient had motor strength of 5/5 on the both  extrenmoietes.  Patient was neurovascularly intact in the both sides lower extremity. There were no complications throughout the hospital course as the patient passed physical therapy/occupational therapy on post-operative day #  .  The drain was pulled on post-operative day #.  Her discharge hemoglobin was  and the patient did not require a blood transfusion.  She was followed by internal medicine during this hospital visit to manage her medical problems.     Upon discharge, the patient was seen by Dr. jimenez and all questions were answered.  She was discharged on home medications as outlined in medication reconciliation list outlined below.  The patient has instructions that if she has increased pain, fever, erythema, swelling or drainage to immediately call.          Discharge Instructions and Follow-Up:   Discharge diet: Regular   Discharge activity: Activity as tolerated   Discharge follow-up: Follow up with consultant in 2  weeks weeks   Wound care: Apply bandage daily  Keep wound clean and dry  May get incision wet in shower but do not soak or scrub           Discharge Disposition:   Discharged to home      Attestation:  Care  coordination / counseling time: 45 minutes    Shola Yu MD

## 2019-12-05 NOTE — PROGRESS NOTES
[facility] Initial Evaluation  Subjective:  The history is provided by the patient. No  was used.   Helga Geronimo being seen for s/p L TSA.   Problem began 11/20/2019. Where condition occurred: for unknown reasons.Problem occurred: DJD  and reported as 1/10 on pain scale. General health as reported by patient is fair. Pertinent medical history includes:  Depression, high blood pressure, overweight and smoking.  Medical allergies: statins.  Surgeries: knee, hips--THAs, shoulders.  Current medications:  High blood pressure medication, sleep medication and anti-depressants.     Pain is described as aching  Pain is the same all the time. Since onset symptoms are gradually improving. Imaging testing: none. Previous treatment includes physical therapy (in hospital after surgery). There was mild improvement following previous treatment.   Patient is retired.   Barriers include:  Lives alone.  Red flags:  None as reported by patient.  Type of problem:  Left shoulder   Condition occurred with:  Degenerative joint disease. This is a new condition   Problem details: Patient had a L TSA on 11/20/2019.  She had been wearing a sling with an abductor pillow until last week.  She has just been wearing the sling now but was told yesterday she does not have to wear for sleeping now.  She lives alone in an apartment.   Patient reports pain:  In the joint.  Associated symptoms:  Loss of motion/stiffness and loss of strength. Exacerbated by: moving L arm, especially out to the side, lifting the arm in any way, dressing, bathing. and relieved by ice and rest.                      Objective:  System                   Shoulder Evaluation:  ROM:  AROM:  not assessed (due to surgery)                            PROM:    Flexion:  Left:  98          Abduction:  Left:  70        Internal Rotation:  Left:  63      External Rotation:  Left:  38                        Strength:  not assessed (due to  surgery)                          Palpation:  Palpation assessed shoulder: incision healing well, no signs of infection noted, staples out.                                         General     ROS    Assessment/Plan:    Patient is a 69 year old female with left side shoulder complaints s/p TSA on 11/20/2019.  She has pain and decreased ROM as expected post-surgery.  She should make good progress with treatment focusing on ROM and strengthening per MD orders to improve overall function.      Patient has the following significant findings with corresponding treatment plan.                Diagnosis 1:  S/p L TSA  Pain -  self management, education and home program  Decreased ROM/flexibility - therapeutic exercise and home program  Decreased strength - therapeutic exercise, therapeutic activities and home program  Decreased function - therapeutic activities and home program  Impaired posture - neuro re-education and home program    Cumulative Therapy Evaluation is: Low complexity.    Previous and current functional limitations:  (See Goal Flow Sheet for this information)    Short term and Long term goals: (See Goal Flow Sheet for this information)     Communication ability:  Patient appears to be able to clearly communicate and understand verbal and written communication and follow directions correctly.  Treatment Explanation - The following has been discussed with the patient:   RX ordered/plan of care  Anticipated outcomes  Possible risks and side effects  This patient would benefit from PT intervention to resume normal activities.   Rehab potential is good.    Frequency:  2 X week, once daily  Duration:  for 4 weeks tapering to 1 X a week over 6 weeks  Discharge Plan:  Achieve all LTG.  Independent in home treatment program.  Reach maximal therapeutic benefit.    Please refer to the daily flowsheet for treatment today, total treatment time and time spent performing 1:1 timed codes.

## 2019-12-10 ENCOUNTER — THERAPY VISIT (OUTPATIENT)
Dept: PHYSICAL THERAPY | Facility: CLINIC | Age: 69
End: 2019-12-10
Payer: COMMERCIAL

## 2019-12-10 DIAGNOSIS — Z47.1 AFTERCARE FOLLOWING SHOULDER JOINT REPLACEMENT SURGERY, UNSPECIFIED LATERALITY: ICD-10-CM

## 2019-12-10 DIAGNOSIS — Z96.619 AFTERCARE FOLLOWING SHOULDER JOINT REPLACEMENT SURGERY, UNSPECIFIED LATERALITY: ICD-10-CM

## 2019-12-10 PROCEDURE — 97110 THERAPEUTIC EXERCISES: CPT | Mod: GP | Performed by: PHYSICAL THERAPIST

## 2019-12-10 PROCEDURE — 97112 NEUROMUSCULAR REEDUCATION: CPT | Mod: GP | Performed by: PHYSICAL THERAPIST

## 2019-12-12 ENCOUNTER — THERAPY VISIT (OUTPATIENT)
Dept: PHYSICAL THERAPY | Facility: CLINIC | Age: 69
End: 2019-12-12
Payer: COMMERCIAL

## 2019-12-12 DIAGNOSIS — Z47.1 AFTERCARE FOLLOWING SHOULDER JOINT REPLACEMENT SURGERY, UNSPECIFIED LATERALITY: ICD-10-CM

## 2019-12-12 DIAGNOSIS — Z96.619 AFTERCARE FOLLOWING SHOULDER JOINT REPLACEMENT SURGERY, UNSPECIFIED LATERALITY: ICD-10-CM

## 2019-12-12 PROCEDURE — 97110 THERAPEUTIC EXERCISES: CPT | Mod: GP | Performed by: PHYSICAL THERAPIST

## 2019-12-17 ENCOUNTER — THERAPY VISIT (OUTPATIENT)
Dept: PHYSICAL THERAPY | Facility: CLINIC | Age: 69
End: 2019-12-17
Payer: COMMERCIAL

## 2019-12-17 DIAGNOSIS — Z47.1 AFTERCARE FOLLOWING SHOULDER JOINT REPLACEMENT SURGERY, UNSPECIFIED LATERALITY: ICD-10-CM

## 2019-12-17 DIAGNOSIS — Z96.619 AFTERCARE FOLLOWING SHOULDER JOINT REPLACEMENT SURGERY, UNSPECIFIED LATERALITY: ICD-10-CM

## 2019-12-17 PROCEDURE — 97110 THERAPEUTIC EXERCISES: CPT | Mod: GP | Performed by: PHYSICAL THERAPIST

## 2019-12-19 ENCOUNTER — THERAPY VISIT (OUTPATIENT)
Dept: PHYSICAL THERAPY | Facility: CLINIC | Age: 69
End: 2019-12-19
Payer: COMMERCIAL

## 2019-12-19 DIAGNOSIS — Z96.619 AFTERCARE FOLLOWING SHOULDER JOINT REPLACEMENT SURGERY, UNSPECIFIED LATERALITY: ICD-10-CM

## 2019-12-19 DIAGNOSIS — Z47.1 AFTERCARE FOLLOWING SHOULDER JOINT REPLACEMENT SURGERY, UNSPECIFIED LATERALITY: ICD-10-CM

## 2019-12-19 PROCEDURE — 97110 THERAPEUTIC EXERCISES: CPT | Mod: GP | Performed by: PHYSICAL THERAPIST

## 2019-12-26 ENCOUNTER — THERAPY VISIT (OUTPATIENT)
Dept: PHYSICAL THERAPY | Facility: CLINIC | Age: 69
End: 2019-12-26
Payer: COMMERCIAL

## 2019-12-26 DIAGNOSIS — Z96.619 AFTERCARE FOLLOWING SHOULDER JOINT REPLACEMENT SURGERY, UNSPECIFIED LATERALITY: ICD-10-CM

## 2019-12-26 DIAGNOSIS — Z47.1 AFTERCARE FOLLOWING SHOULDER JOINT REPLACEMENT SURGERY, UNSPECIFIED LATERALITY: ICD-10-CM

## 2019-12-26 PROCEDURE — 97110 THERAPEUTIC EXERCISES: CPT | Mod: GP | Performed by: PHYSICAL THERAPIST

## 2020-01-08 ENCOUNTER — THERAPY VISIT (OUTPATIENT)
Dept: PHYSICAL THERAPY | Facility: CLINIC | Age: 70
End: 2020-01-08
Payer: COMMERCIAL

## 2020-01-08 DIAGNOSIS — Z96.619 AFTERCARE FOLLOWING SHOULDER JOINT REPLACEMENT SURGERY, UNSPECIFIED LATERALITY: ICD-10-CM

## 2020-01-08 DIAGNOSIS — Z47.1 AFTERCARE FOLLOWING SHOULDER JOINT REPLACEMENT SURGERY, UNSPECIFIED LATERALITY: ICD-10-CM

## 2020-01-08 PROCEDURE — 97110 THERAPEUTIC EXERCISES: CPT | Mod: GP | Performed by: PHYSICAL THERAPIST

## 2020-01-08 NOTE — LETTER
[facility] [place] [facility] PHYSICAL THERAPY  [address] [place] [id number]  [phone]  2020    Re: Helga Geronimo   :   1950  MRN:  [id number]   REFERRING PHYSICIAN:   Shola Yu    [facility]LET[facility] PHYSICAL [facility]    Date of Initial Evaluation:  2019  Visits:  Rxs Used: 7  Reason for Referral:  Aftercare following shoulder joint replacement surgery, unspecified laterality    PROGRESS  REPORT  Progress reporting period is from 2019 to 2020.       SUBJECTIVE  Subjective: Patient reports her L shoulder is doing well and improving.  She returned to work last Thursday.  L arm gets a little sore by the end of the day but not too bad.   She notices she can reach easier and farther with her L arm now.  Current Pain level: 1/10.     Initial Pain level: 1/10.   Changes in function:  Yes, able to use L arm more for reaching.    Adverse reaction to treatment or activity: None    OBJECTIVE  Objective: L shoulder AROM:  flexion 122 degrees, abduction 63 degrees.  PROM:  flexion 124 degrees, ER 50 degrees, abduction 85 degrees.       ASSESSMENT/PLAN  Patient has been seen for 7 visits with treatment focusing on ROM exercises per MD orders/restrictions.  She has made good progress since her initial visit.  ROM has progressed nicely, and her strength will improve as we are able to incorporate this into treatment.  She would benefit from additional visits to further progress ROM and imrpove strength for improved mobility and function.    Updated problem list and treatment plan: Diagnosis 1:  S/p L TSA  Pain -  self management, education and home program  Decreased ROM/flexibility - therapeutic exercise and home program  Decreased strength - therapeutic exercise, therapeutic activities and home program  Decreased function - therapeutic activities and home program  Impaired posture - neuro re-education and home  program  STG/LTGs have been met or progress has been made towards goals:  Yes (See Goal flow sheet completed today.)  Assessment of Progress: The patient's condition is improving.  Self Management Plans:  Patient has been instructed in a home treatment program.  Patient  has been instructed in self management of symptoms.  I have re-evaluated this patient and find that the nature, scope, duration and intensity of the therapy is appropriate for the medical condition of the patient.  Helga continues to require the following intervention to meet STG and LTG's:  PT    Recommendations:  This patient would benefit from continued therapy.     Frequency:  1 X week, once daily  Duration:  for 9 weeks    Thank you for your referral.    INQUIRIES  Therapist: Sylvia Holley, PT   [facility] [place]  [address] [address]  Phone: [phone]  Fax: [phone]

## 2020-01-08 NOTE — PROGRESS NOTES
Subjective:  HPI                    Objective:  System    Physical Exam    General     ROS    Assessment/Plan:    PROGRESS  REPORT    Progress reporting period is from 12/05/2019 to 01/08/2020.       SUBJECTIVE  Subjective: Patient reports her L shoulder is doing well and improving.  She returned to work last Thursday.  L arm gets a little sore by the end of the day but not too bad.   She notices she can reach easier and farther with her L arm now.  Current Pain level: 1/10.     Initial Pain level: 1/10.   Changes in function:  Yes, able to use L arm more for reaching.    Adverse reaction to treatment or activity: None    OBJECTIVE  Objective: L shoulder AROM:  flexion 122 degrees, abduction 63 degrees.  PROM:  flexion 124 degrees, ER 50 degrees, abduction 85 degrees.       ASSESSMENT/PLAN  Patient has been seen for 7 visits with treatment focusing on ROM exercises per MD orders/restrictions.  She has made good progress since her initial visit.  ROM has progressed nicely, and her strength will improve as we are able to incorporate this into treatment.  She would benefit from additional visits to further progress ROM and imrpove strength for improved mobility and function.    Updated problem list and treatment plan: Diagnosis 1:  S/p L TSA  Pain -  self management, education and home program  Decreased ROM/flexibility - therapeutic exercise and home program  Decreased strength - therapeutic exercise, therapeutic activities and home program  Decreased function - therapeutic activities and home program  Impaired posture - neuro re-education and home program  STG/LTGs have been met or progress has been made towards goals:  Yes (See Goal flow sheet completed today.)  Assessment of Progress: The patient's condition is improving.  Self Management Plans:  Patient has been instructed in a home treatment program.  Patient  has been instructed in self management of symptoms.  I have re-evaluated this patient and find that the  nature, scope, duration and intensity of the therapy is appropriate for the medical condition of the patient.  Helga continues to require the following intervention to meet STG and LTG's:  PT    Recommendations:  This patient would benefit from continued therapy.     Frequency:  1 X week, once daily  Duration:  for 9 weeks        Please refer to the daily flowsheet for treatment today, total treatment time and time spent performing 1:1 timed codes.

## 2020-01-16 ENCOUNTER — THERAPY VISIT (OUTPATIENT)
Dept: PHYSICAL THERAPY | Facility: CLINIC | Age: 70
End: 2020-01-16
Payer: COMMERCIAL

## 2020-01-16 DIAGNOSIS — Z47.1 AFTERCARE FOLLOWING SHOULDER JOINT REPLACEMENT SURGERY, UNSPECIFIED LATERALITY: ICD-10-CM

## 2020-01-16 DIAGNOSIS — Z96.619 AFTERCARE FOLLOWING SHOULDER JOINT REPLACEMENT SURGERY, UNSPECIFIED LATERALITY: ICD-10-CM

## 2020-01-16 PROCEDURE — 97110 THERAPEUTIC EXERCISES: CPT | Mod: GP | Performed by: PHYSICAL THERAPIST

## 2020-01-22 ENCOUNTER — THERAPY VISIT (OUTPATIENT)
Dept: PHYSICAL THERAPY | Facility: CLINIC | Age: 70
End: 2020-01-22
Payer: COMMERCIAL

## 2020-01-22 DIAGNOSIS — Z47.1 AFTERCARE FOLLOWING SHOULDER JOINT REPLACEMENT SURGERY, UNSPECIFIED LATERALITY: ICD-10-CM

## 2020-01-22 DIAGNOSIS — Z96.619 AFTERCARE FOLLOWING SHOULDER JOINT REPLACEMENT SURGERY, UNSPECIFIED LATERALITY: ICD-10-CM

## 2020-01-22 PROCEDURE — 97110 THERAPEUTIC EXERCISES: CPT | Mod: GP | Performed by: PHYSICAL THERAPIST

## 2020-01-29 ENCOUNTER — THERAPY VISIT (OUTPATIENT)
Dept: PHYSICAL THERAPY | Facility: CLINIC | Age: 70
End: 2020-01-29
Payer: COMMERCIAL

## 2020-01-29 DIAGNOSIS — Z96.619 AFTERCARE FOLLOWING SHOULDER JOINT REPLACEMENT SURGERY, UNSPECIFIED LATERALITY: ICD-10-CM

## 2020-01-29 DIAGNOSIS — Z47.1 AFTERCARE FOLLOWING SHOULDER JOINT REPLACEMENT SURGERY, UNSPECIFIED LATERALITY: ICD-10-CM

## 2020-01-29 PROCEDURE — 97110 THERAPEUTIC EXERCISES: CPT | Mod: GP | Performed by: PHYSICAL THERAPIST

## 2020-01-29 PROCEDURE — 97112 NEUROMUSCULAR REEDUCATION: CPT | Mod: GP | Performed by: PHYSICAL THERAPIST

## 2020-02-12 ENCOUNTER — THERAPY VISIT (OUTPATIENT)
Dept: PHYSICAL THERAPY | Facility: CLINIC | Age: 70
End: 2020-02-12
Payer: COMMERCIAL

## 2020-02-12 DIAGNOSIS — Z96.619 AFTERCARE FOLLOWING SHOULDER JOINT REPLACEMENT SURGERY, UNSPECIFIED LATERALITY: ICD-10-CM

## 2020-02-12 DIAGNOSIS — Z47.1 AFTERCARE FOLLOWING SHOULDER JOINT REPLACEMENT SURGERY, UNSPECIFIED LATERALITY: ICD-10-CM

## 2020-02-12 PROCEDURE — 97112 NEUROMUSCULAR REEDUCATION: CPT | Mod: GP | Performed by: PHYSICAL THERAPIST

## 2020-02-12 PROCEDURE — 97110 THERAPEUTIC EXERCISES: CPT | Mod: GP | Performed by: PHYSICAL THERAPIST

## 2020-02-26 ENCOUNTER — THERAPY VISIT (OUTPATIENT)
Dept: PHYSICAL THERAPY | Facility: CLINIC | Age: 70
End: 2020-02-26
Payer: COMMERCIAL

## 2020-02-26 DIAGNOSIS — Z47.1 AFTERCARE FOLLOWING SHOULDER JOINT REPLACEMENT SURGERY, UNSPECIFIED LATERALITY: ICD-10-CM

## 2020-02-26 DIAGNOSIS — Z96.619 AFTERCARE FOLLOWING SHOULDER JOINT REPLACEMENT SURGERY, UNSPECIFIED LATERALITY: ICD-10-CM

## 2020-02-26 PROCEDURE — 97112 NEUROMUSCULAR REEDUCATION: CPT | Mod: GP | Performed by: PHYSICAL THERAPIST

## 2020-02-26 PROCEDURE — 97110 THERAPEUTIC EXERCISES: CPT | Mod: GP | Performed by: PHYSICAL THERAPIST

## 2020-02-26 NOTE — LETTER
"[facility] ATHLETIC [facility] PHYSICAL THERAPY  [address] [place] [id number]  [phone]    2020    Re: Helga Geronimo   :   1950  MRN:  [id number]   REFERRING PHYSICIAN:   Shola Yu    [facility] PHYSICAL THERAPY    Date of Initial Evaluation:  2019  Visits:  Rxs Used: 12  Reason for Referral:  Aftercare following shoulder joint replacement surgery, unspecified laterality    DISCHARGE REPORT  Progress reporting period is from 2020 to 2020.       SUBJECTIVE  Subjective:  When last seen on 2020, patient reports her L shoulder was doing well.  She thought she \"overdoes it\" (i.e. moving boxes, cleaning closets) sometimes, but usually she just felt stiff the next morning.  She was contacted on 2020 and reported she was doing well--no further PT needed.  Current Pain level: 10.     Initial Pain level: /10.   Changes in function:  Unknown as patient did not return for additional follow-up visits.  Adverse reaction to treatment or activity: Unknown as patient did not return for additional follow-up visits.    OBJECTIVE  Objective:  As of 2020:  L shoulder AROM:  flexion 128 degrees, ER 68 degrees.  PROM:  flexion 133 degrees.   Current objective measurements are unavailable as patient did not return for additional follow-up visits.    ASSESSMENT/PLAN  Patient was seen for 12 visits with treatment focusing on ROM and strengthening exercises for the L shoulder.  She made steady progress throughout treatment and was doing well at her last visit.   Updated problem list and treatment plan: Diagnosis 1:  S/p L TSA   No updated problem list or treatment plan as patient did not return for additional visits and is discharged from PT at this time.    STG/LTGs have been met or progress has been made towards goals:  Unknown as patient did not return for additional follow-up visits.  Assessment of " Progress: The patient has not returned to therapy. Current status is unknown.  Self Management Plans:  Patient has been instructed in a home treatment program.  Patient  has been instructed in self management of symptoms.  Helga continues to require the following intervention to meet STG and LTG's:  PT intervention is no longer required to meet STG/LTG.    Recommendations:  Patient is discharged from PT as she did not return for further follow-up visits.    Thank you for your referral.    INQUIRIES  Therapist: Sylvia Holley, PT  [facility] PHYSICAL THERAPY  [address] [place] [id number]  Phone: [phone]  Fax: [phone]

## 2020-04-07 ENCOUNTER — TELEPHONE (OUTPATIENT)
Dept: PHYSICAL THERAPY | Facility: CLINIC | Age: 70
End: 2020-04-07

## 2020-05-01 ENCOUNTER — NURSE TRIAGE (OUTPATIENT)
Dept: NURSING | Facility: CLINIC | Age: 70
End: 2020-05-01

## 2020-05-01 ENCOUNTER — VIRTUAL VISIT (OUTPATIENT)
Dept: URGENT CARE | Facility: CLINIC | Age: 70
End: 2020-05-01
Payer: COMMERCIAL

## 2020-05-01 DIAGNOSIS — Z20.822 SUSPECTED COVID-19 VIRUS INFECTION: ICD-10-CM

## 2020-05-01 DIAGNOSIS — Z20.822 SUSPECTED COVID-19 VIRUS INFECTION: Primary | ICD-10-CM

## 2020-05-01 PROCEDURE — 99000 SPECIMEN HANDLING OFFICE-LAB: CPT | Performed by: FAMILY MEDICINE

## 2020-05-01 PROCEDURE — 36415 COLL VENOUS BLD VENIPUNCTURE: CPT | Performed by: FAMILY MEDICINE

## 2020-05-01 PROCEDURE — 86769 SARS-COV-2 COVID-19 ANTIBODY: CPT | Mod: 90 | Performed by: FAMILY MEDICINE

## 2020-05-01 PROCEDURE — 99203 OFFICE O/P NEW LOW 30 MIN: CPT | Mod: 95 | Performed by: STUDENT IN AN ORGANIZED HEALTH CARE EDUCATION/TRAINING PROGRAM

## 2020-05-01 NOTE — Clinical Note
Hello  I am copying you as we ordered antibody test in case you would like to track result notification.  Best wishes,  Sage Farrar MD

## 2020-05-01 NOTE — PROGRESS NOTES
"The patient has been notified of following:     \"This telephone visit will be conducted via a call between you and your physician/provider. We have found that certain health care needs can be provided without the need for a physical exam.  This service lets us provide the care you need with a short phone conversation.  If a prescription is necessary we can send it directly to your pharmacy.  If lab work is needed we can place an order for that and you can then stop by our lab to have the test done at a later time.    If during the course of the call the physician/provider feels a telephone visit is not appropriate, you will not be charged for this service.\"     Subjective     CC: Helga Geronimo  is a 70 year old female with history of HTN and COPD who presents to clinic today for the following health issues:   Chief Complaint   Patient presents with     Suspected Covid      History:  Patient reports cough, shortness of breath and chest tightness which started 2-3 weeks ago.  Cough is occasionally productive of clear phlegm.  Shortness of breath is with walking around her apartment or going downstairs to get her mail.  She states she is fairly comfortable when seated and talking in short sentences.  She uses her daily Symbicort and Spiriva, and has been using her Ventolin inhaler when she feels short of breath or has a coughing fit.  She did have a fever at the beginning of this illness, approximately 2 weeks ago up to 100.4 F.  She has been measuring her temperature daily and has remained below 100.  Overall, she states her symptoms have been stable during the course of her illness.  She has not been evaluated for the symptoms yet.  She went to her PCP clinic yesterday for her B12 shot and screening labs, however, due to her recent fever and other symptoms she was sent away.  She does have a telephone visit with her PCP next week.    In the 14 days before your symptoms started, have you had close contact with a " COVID-19 (Coronavirus) patient? No    In the 14 days before your symptoms started, have you traveled internationally or to a state with high rates of COVID19? No    Do you have a fever? Yes, I have a fever (100.4 to 101.4) -2 weeks ago, currently afebrile    Are you having difficulty breathing? Yes    Please describe what kind of difficulty you are having breathing. Mild (short of breath with walking)    Do you have a cough? Yes     Is your coughing worse when you are exposed to pollen, dust, or other things in the environment? No      Are you coughing up any mucus? No, it's a dry cough - clear phlegm sometimes    Are you experiencing any of the following? None of these    What other symptoms have you experienced? Headache    Do you have any of the following? None of the above    Have you ever been diagnosed with asthma, bronchitis, or lung disease? Yes     Do you smoke? Yes    Are there people you know with similar symptoms? No - lives alone    Have you recently been hospitalized? No    Patient Active Problem List   Diagnosis     iamJOINT PAIN-LOWER LEG     iamAFTERCARE FOLLOWING JOINT REPLACEMENT     iamKNEE JOINT REPLACEMENT STATUS     Pain in joint, pelvic region and thigh     Pain in joint, shoulder region     Pain, low back     Left shoulder pain     H/O total shoulder replacement, left     Aftercare following shoulder joint replacement surgery, unspecified laterality     Past Surgical History:   Procedure Laterality Date     ARTHROPLASTY SHOULDER Left 11/20/2019    Procedure: LEFT TOTAL SHOULDER ARTHROPLASTY;  Surgeon: Shola Yu MD;  Location: [facility] OR     bilat hip replacements        HYSTERECTOMY       JOINT REPLACEMENT         Social History     Tobacco Use     Smoking status: Current Some Day Smoker   Substance Use Topics     Alcohol use: Yes     Comment: rare     No family history on file.      Reviewed and updated as needed this visit by Provider  Problems         Review of Systems   ROS COMP:  Constitutional, HEENT, cardiovascular, pulmonary, gi and gu systems are negative, except as otherwise noted.      Objective    Gen: Patient is alert, oriented, conversant.  Resp: Speaking full sentence, no audible shortness of breath.  No cough or wheeze.    No new labs.         Assessment/Plan:  1. Suspected Covid-19 Virus Infection  Patient with history of COPD and hypertension presenting with signs and symptoms consistent with viral upper respiratory infection, COVID-19 on the differential.  She has had symptoms for 2-3 weeks, therefore, she is more appropriate for antibody testing rather than PCR for COVID-19.  I did put in an order for this and told patient someone will be calling her to get this scheduled.  I did also provide her the scheduling number so she may inquire if the labs that her PCP had ordered could be obtained at the same time as her antibody test to reduce exposures.  Advised patient that this may not be possible, but she may certainly inquire about this.  Given her history and age, I did discuss with patient that she is high risk for more severe disease with COVID-19, however, given the duration and mildness of her symptoms at this point I would expect her to continue to improve.  Bacterial infection unlikely given resolution of fever and overall stability of symptoms, with predominantly dry nature of her cough.  She may continue to use her inhalers as she is already doing.  She has an appointment with her PCP next week, which I encouraged her to keep and will serve as a check-in.  Patient does not use email or text, also could not refer to get well loop.  Return precautions discussed.  Patient expressed understanding and agreement with this plan.  - COVID-19 Virus (Coronavirus) Antibody; Future  - COVID-19 GetWell Loop Referral; Future    Phone call duration:  20 minutes  Patient was staffed with Dr. Farrar, who is in agreement with this plan. Dr. Farrar spent 5 minutes on the phone with  the patient.    Radha Mendoza MD

## 2020-05-01 NOTE — PATIENT INSTRUCTIONS
Instructions for Patients  Your symptoms could be due to COVID-19, but it also could be due to a number of other respiratory illnesses.  We are not doing testing at this time for COVID-19 unless symptoms are severe enough to require hospitalization.  It is recommended that you stay home to prevent the spread of your illness.  To do this follow these instructions:    Regardless of if you have been tested or not:  Patient who have symptoms (cough, fever, or shortness of breath), need to isolate for 7 days from when symptoms started AND 72 hours after fever resolves (without fever reducing medications) AND improvement of respiratory symptoms (whichever is longer).      Isolate yourself at home (in own room/own bathroom if possible)    Do Not allow any visitors    Do Not go to work or school    Do Not go to [employer],  centers, shopping, or other public places.    Do Not shake hands.    Avoid close and intimate contact with others (hugging, kissing).    Follow CDC recommendations for household cleaning of frequently touched services.     After the initial 7 days, continue to isolate yourself from household members as much as possible. To continue decrease the risk of community spread and exposure, you and any members of your household should limit activities in public for 14 days after starting home isolation.     You can reference the following CDC link for helpful home isolation/care tips:  https://www.cdc.gov/coronavirus/2019-ncov/downloads/10Things.pdf    Protect Others:    Cover your mouth and nose with a mask, disposable tissue or wash cloth to avoid spreading germs to others.    Wash your hands and face frequently with soap and water.    Fever Medicines:    For fever relief, take acetaminophen or ibuprofen.    Treat fevers above 101  F (38.3  C) to lower fevers and make you more comfortable.     Acetaminophen (e.g., Tylenol): Take 650 mg (two 325 mg pills) by mouth every 4-6 hours as needed of regular  strength Tylenol or 1,000 mg (two 500 mg pills) every 8 hours as needed of Extra Strength Tylenol.     Ibuprofen (e.g., Motrin, Advil): Take 400 mg (two 200 mg pills) by mouth every 6 hours as needed.     Acetaminophen is thought to be safer than ibuprofen or naproxen for people over 65 years old. Acetaminophen is in many OTC and prescription medicines. It might be in more than one medicine that you are taking. You need to be careful and not take an overdose. Before taking any medicine, read all the instructions on the package.    Caution - NSAIDs (e.g., ibuprofen, naproxen): Do not take nonsteroidal anti-inflammatory drugs (NSAIDs) if you have stomach problems, kidney disease, heart failure, or other contraindications to using this type of medicine. Do not take NSAID medicines for over 7 days without consulting your PCP. Do not take NSAID medicines if you are pregnant. Do not take NSAID medicines if you are also taking blood thinners.     Call Back If: Breathing difficulty develops or you become worse.    Thank you for limiting contact with others, wearing a simple mask to cover your cough, practice good hand hygiene habits and accessing our virtual services where possible to limit the spread of this virus.    For more information about COVID19 and options for caring for yourself at home, please visit the CDC website at https://www.cdc.gov/coronavirus/2019-ncov/about/steps-when-sick.html  For more options for care at [facility], please visit our website at [email or web address]

## 2020-05-01 NOTE — PROGRESS NOTES
I was present during the key/critical portion of the telephone visit. The patient acknowledged that I participated in the telephone conversation. I discussed the case with the resident and agree with the note as documented by the resident.    I personally spent a total of 8 minutes speaking with Helga Geronimo during today s visit.     Sage Farrar MD  5/1/2020 at 8:54 AM- 9:02 AM

## 2020-05-01 NOTE — TELEPHONE ENCOUNTER
Instructions for Patients  Based on the information you have provided, further evaluation in urgent care is indicated. Please call [facility] ([phone]) to schedule a urgent care appointment at one of our urgent care or walk in care sites. These are located in [facility], [place] and [place]. It is essential that you let the  know that you were referred to be seen in urgent care from [facility] or provider visit.       At this time we will NOT perform coronavirus testing in urgent care sites. This test is currently being reserved for patients who are being admitted to the hospital.    PLEASE BRING DOCUMENTATION FROM THIS COMPLETED VISIT TO YOUR URGENT CARE VISIT.    Please also bring your smart device(s) (smart phones, tablets, laptops) and their charging cables for your personal use and to communicate with your care team during your visit.    While you are at home please follow these instructions to care for yourself:    Regardless of if you have been tested or not:  Patient who have symptoms (cough, fever, or shortness of breath), need to isolate for 7 days from when symptoms started AND 72 hours after fever resolves (without fever reducing medications) AND improvement of respiratory symptoms (whichever is longer).      Isolate yourself at home (in own room/own bathroom if possible)    Do Not allow any visitors    Do Not go to work or school    Do Not go to [employer],  centers, shopping, or other public places.    Do Not shake hands.    Avoid close and intimate contact with others (hugging, kissing).    Follow CDC recommendations for household cleaning of frequently touched services.     After the initial 7 days, continue to isolate yourself from household members as much as possible. To continue decrease the risk of community spread and exposure, you and any members of your household should limit  activities in public for 14 days after starting home isolation.     You can reference the following CDC link for helpful home isolation/care tips:  https://www.cdc.gov/coronavirus/2019-ncov/downloads/10Things.pdf    Protect Others:    Cover Your Mouth and Nose with a mask, disposable tissue or wash cloth to avoid spreading germs to others.    Wash your hands and face frequently with soap and water.    Fever Medicines:    For fever relief, take acetaminophen or ibuprofen.    Treat fevers above 101  F (38.3  C) to lower fevers and make you more comfortable.     Acetaminophen (e.g., Tylenol): Take 650 mg (two 325 mg pills) by mouth every 4-6 hours as needed of regular strength Tyleno or 1,000 mg (two 500 mg pills) every 8 hours as needed of Extra Strength Tylenol.     Ibuprofen (e.g., Motrin, Advil): Take 400 mg (two 200 mg pills) by mouth every 6 hours as needed.     Acetaminophen is thought to be safer than ibuprofen or naproxen for people over 65 years old. Acetaminophen is in many OTC and prescription medicines. It might be in more than one medicine that you are taking. You need to be careful and not take an overdose. Before taking any medicine, read all the instructions on the package.    Caution -NSAIDs (e.g., ibuprofen, naproxen): Do not take nonsteroidal anti-inflammatory drugs (NSAIDs) if you have stomach problems, kidney disease, heart failure, or other contraindications to using this type of medicine. Do not take NSAID medicines for over 7 days without consulting your PCP. Do not take NSAID medicines if you are pregnant. Do not take NSAID medicines if you are also taking blood thinners.     Call Back If: Breathing difficulty develops or you become worse.      Thank you for limiting contact with others, wearing a simple mask to cover your cough, practice good hand hygiene habits and accessing our virtual services where possible to limit the spread of this virus.    For more information about COVID19 and  options for caring for yourself at home, please visit the CDC website at https://www.cdc.gov/coronavirus/2019-ncov/about/steps-when-sick.html  For more options for care at [facility], please visit our website at [email or web address]    For more information, please use the [place] Department of Health COVID-19 Website: https://www.health.[facility].[place]./diseases/coronavirus/index.html  [place] Department of Health ([facility]) COVID-19 Hotlines (Interpreters available):      Health questions: Phone Number: [phone] or [phone] and Hours: 7 a.m. to 7 p.m.    Schools and  questions: Phone Number: [phone] or [phone] and Hours 7 a.m. to 7 p.m.      Reason for Disposition    MILD difficulty breathing (e.g., minimal/no SOB at rest, SOB with walking, pulse <100)    Additional Information    Negative: SEVERE difficulty breathing (e.g., struggling for each breath, speaks in single words)    Negative: Difficult to awaken or acting confused (e.g., disoriented, slurred speech)    Negative: Bluish (or gray) lips or face now    Negative: Shock suspected (e.g., cold/pale/clammy skin, too weak to stand, low BP, rapid pulse)    Negative: Sounds like a life-threatening emergency to the triager    Negative: COVID-19 and Breastfeeding, questions about    Negative: [1] COVID-19 exposure AND [2] no symptoms    Negative: [1] COVID-19 suspected (e.g., cough, fever, shortness of breath) AND [2] mild symptoms AND [3] public health department recommends testing    Negative: SEVERE or constant chest pain (Exception: mild central chest pain, present only when coughing)    Negative: MODERATE difficulty breathing (e.g., speaks in phrases, SOB even at rest, pulse 100-120)    Negative: Patient sounds very sick or weak to the triager    Protocols used: CORONAVIRUS (COVID-19) DIAGNOSED OR [id number]-DR 4.22.20    She was sent home yesterday from a clinic visit, they wouldn't see her because  of a low grade fever. I connected her with scheduling for an urgent care telephone visit today.  She is short of breath walking in her apartment.  Leighann Head RN  [facility]

## 2020-05-04 LAB
COVID-19 SPIKE RBD ABY TITER: NORMAL
COVID-19 SPIKE RBD ABY: NEGATIVE

## 2020-07-16 DIAGNOSIS — Z11.59 ENCOUNTER FOR SCREENING FOR OTHER VIRAL DISEASES: Primary | ICD-10-CM

## 2020-08-23 DIAGNOSIS — Z11.59 ENCOUNTER FOR SCREENING FOR OTHER VIRAL DISEASES: ICD-10-CM

## 2020-08-23 PROCEDURE — U0003 INFECTIOUS AGENT DETECTION BY NUCLEIC ACID (DNA OR RNA); SEVERE ACUTE RESPIRATORY SYNDROME CORONAVIRUS 2 (SARS-COV-2) (CORONAVIRUS DISEASE [COVID-19]), AMPLIFIED PROBE TECHNIQUE, MAKING USE OF HIGH THROUGHPUT TECHNOLOGIES AS DESCRIBED BY CMS-2020-01-R: HCPCS | Performed by: ORTHOPAEDIC SURGERY

## 2020-08-24 LAB
SARS-COV-2 RNA SPEC QL NAA+PROBE: NOT DETECTED
SPECIMEN SOURCE: NORMAL

## 2020-08-25 RX ORDER — VARENICLINE TARTRATE 1 MG/1
1 TABLET, FILM COATED ORAL 2 TIMES DAILY
Status: ON HOLD | COMMUNITY
End: 2024-07-30

## 2020-08-25 RX ORDER — FUROSEMIDE 20 MG
20 TABLET ORAL EVERY MORNING
COMMUNITY
End: 2020-08-25

## 2020-08-25 RX ORDER — BUSPIRONE HYDROCHLORIDE 15 MG/1
15 TABLET ORAL DAILY
COMMUNITY
End: 2020-08-25

## 2020-08-25 RX ORDER — TRAMADOL HYDROCHLORIDE 50 MG/1
50 TABLET ORAL 3 TIMES DAILY PRN
Status: ON HOLD | COMMUNITY
End: 2020-08-28

## 2020-08-25 RX ORDER — FUROSEMIDE 20 MG
20 TABLET ORAL EVERY MORNING
Status: ON HOLD | COMMUNITY
End: 2024-07-30

## 2020-08-25 RX ORDER — AMOXICILLIN 500 MG/1
2000 CAPSULE ORAL DAILY PRN
COMMUNITY

## 2020-08-25 RX ORDER — FERROUS SULFATE 324(65)MG
325 TABLET, DELAYED RELEASE (ENTERIC COATED) ORAL DAILY
COMMUNITY
End: 2020-08-25

## 2020-08-25 NOTE — PROGRESS NOTES
PTA medications updated by Medication Scribe prior to surgery via phone call with patient      -LAST DOSES ENTERED BY NURSE-    Comments:    Medication history sources: Patient, Surescripts and H&P  Medication history source reliability: Good  Adherence assessment: N/A Not Observed    Significant changes made to the medication list:  None      Additional medication history information:   None        Prior to Admission medications    Medication Sig Last Dose Taking? Auth Provider   acetaminophen (TYLENOL) 500 MG tablet Take 1,000 mg by mouth every 6 hours as needed for mild pain  at PRN Yes Reported, Patient   albuterol (PROAIR HFA/PROVENTIL HFA/VENTOLIN HFA) 108 (90 Base) MCG/ACT inhaler Inhale 2 puffs into the lungs daily as needed for shortness of breath / dyspnea or wheezing  at PRN Yes Reported, Patient   amoxicillin (AMOXIL) 500 MG capsule Take 2,000 mg by mouth daily as needed (1 hour prior to dental work) JANUARY 2020 at PRN Yes Reported, Patient   budesonide-formoterol (SYMBICORT) 80-4.5 MCG/ACT Inhaler Inhale 2 puffs into the lungs 2 times daily  Yes Reported, Patient   calcium-vitamin D-vitamin K (VIACTIV) 500-500-40 MG-UNT-MCG CHEW Take 1 tablet by mouth At Bedtime   at HS Yes Reported, Patient   FLUoxetine (PROZAC) 20 MG capsule Take 60 mg by mouth At Bedtime (takes 3 x 20mg)    (in addition to AM dose)  at PM Yes Reported, Patient   FLUoxetine (PROZAC) 20 MG capsule Take 20 mg by mouth every morning (in addition to PM dose)  at AM Yes Reported, Patient   furosemide (LASIX) 20 MG tablet Take 20 mg by mouth every morning  at AM Yes Reported, Patient   gabapentin (NEURONTIN) 300 MG capsule Take 900 mg by mouth 2 times daily (Take 3 X 300 mg = 900 mg dose)  at AM Yes Reported, Patient   losartan-hydrochlorothiazide (HYZAAR) 100-12.5 MG tablet Take 1 tablet by mouth At Bedtime   at HS Yes Reported, Patient   multivitamin, therapeutic (THERA-VIT) TABS tablet Take 1 tablet by mouth At Bedtime   at HS Yes  Reported, Patient   risperiDONE (RISPERDAL) 0.5 MG tablet Take 0.5 mg by mouth daily as needed (MIDDAY if needed) (in addition to morning and afternoon doses) 8/21/2020 at PRN Yes Reported, Patient   risperiDONE (RISPERDAL) 1 MG tablet Take 1 mg by mouth 2 times daily (morning and afternoon at 4pm)  Yes Reported, Patient   tiotropium (SPIRIVA RESPIMAT) 2.5 MCG/ACT inhaler Inhale 2 puffs into the lungs every morning   Yes Reported, Patient   traMADol (ULTRAM) 50 MG tablet Take 50 mg by mouth 3 times daily as needed   at PRN Yes Reported, Patient   traZODone (DESYREL) 100 MG tablet Take 100 mg by mouth At Bedtime   at  Yes Reported, Patient   varenicline (CHANTIX) 1 MG tablet Take 1 mg by mouth 2 times daily  Yes Reported, Patient

## 2020-08-26 ENCOUNTER — APPOINTMENT (OUTPATIENT)
Dept: GENERAL RADIOLOGY | Facility: CLINIC | Age: 70
End: 2020-08-26
Attending: ORTHOPAEDIC SURGERY
Payer: COMMERCIAL

## 2020-08-26 ENCOUNTER — APPOINTMENT (OUTPATIENT)
Dept: PHYSICAL THERAPY | Facility: CLINIC | Age: 70
End: 2020-08-26
Attending: ORTHOPAEDIC SURGERY
Payer: COMMERCIAL

## 2020-08-26 ENCOUNTER — ANESTHESIA (OUTPATIENT)
Dept: SURGERY | Facility: CLINIC | Age: 70
End: 2020-08-26
Payer: COMMERCIAL

## 2020-08-26 ENCOUNTER — HOSPITAL ENCOUNTER (OUTPATIENT)
Facility: CLINIC | Age: 70
Discharge: HOME | End: 2020-08-27
Attending: ORTHOPAEDIC SURGERY | Admitting: ORTHOPAEDIC SURGERY
Payer: COMMERCIAL

## 2020-08-26 ENCOUNTER — ANESTHESIA EVENT (OUTPATIENT)
Dept: SURGERY | Facility: CLINIC | Age: 70
End: 2020-08-26
Payer: COMMERCIAL

## 2020-08-26 DIAGNOSIS — Z96.652 HX OF TOTAL KNEE REPLACEMENT, LEFT: Primary | ICD-10-CM

## 2020-08-26 DIAGNOSIS — Z96.652 TOTAL KNEE REPLACEMENT STATUS, LEFT: ICD-10-CM

## 2020-08-26 LAB — POTASSIUM SERPL-SCNC: 3.9 MMOL/L (ref 3.4–5.3)

## 2020-08-26 PROCEDURE — 40000171 ZZH STATISTIC PRE-PROCEDURE ASSESSMENT III: Performed by: ORTHOPAEDIC SURGERY

## 2020-08-26 PROCEDURE — 97110 THERAPEUTIC EXERCISES: CPT | Mod: GP | Performed by: PHYSICAL THERAPIST

## 2020-08-26 PROCEDURE — 97530 THERAPEUTIC ACTIVITIES: CPT | Mod: GP | Performed by: PHYSICAL THERAPIST

## 2020-08-26 PROCEDURE — 36000093 ZZH SURGERY LEVEL 4 1ST 30 MIN: Performed by: ORTHOPAEDIC SURGERY

## 2020-08-26 PROCEDURE — 25800030 ZZH RX IP 258 OP 636: Performed by: ANESTHESIOLOGY

## 2020-08-26 PROCEDURE — 25000125 ZZHC RX 250: Performed by: ORTHOPAEDIC SURGERY

## 2020-08-26 PROCEDURE — 71000012 ZZH RECOVERY PHASE 1 LEVEL 1 FIRST HR: Performed by: ORTHOPAEDIC SURGERY

## 2020-08-26 PROCEDURE — 97116 GAIT TRAINING THERAPY: CPT | Mod: GP | Performed by: PHYSICAL THERAPIST

## 2020-08-26 PROCEDURE — 25000128 H RX IP 250 OP 636: Performed by: ANESTHESIOLOGY

## 2020-08-26 PROCEDURE — 25000125 ZZHC RX 250: Performed by: ANESTHESIOLOGY

## 2020-08-26 PROCEDURE — 40000985 XR KNEE PORT LT 1/2 VW: Mod: LT

## 2020-08-26 PROCEDURE — 37000009 ZZH ANESTHESIA TECHNICAL FEE, EACH ADDTL 15 MIN: Performed by: ORTHOPAEDIC SURGERY

## 2020-08-26 PROCEDURE — C1776 JOINT DEVICE (IMPLANTABLE): HCPCS | Performed by: ORTHOPAEDIC SURGERY

## 2020-08-26 PROCEDURE — 27210794 ZZH OR GENERAL SUPPLY STERILE: Performed by: ORTHOPAEDIC SURGERY

## 2020-08-26 PROCEDURE — 84132 ASSAY OF SERUM POTASSIUM: CPT | Performed by: ANESTHESIOLOGY

## 2020-08-26 PROCEDURE — 25800025 ZZH RX 258: Performed by: ORTHOPAEDIC SURGERY

## 2020-08-26 PROCEDURE — 36415 COLL VENOUS BLD VENIPUNCTURE: CPT | Performed by: ANESTHESIOLOGY

## 2020-08-26 PROCEDURE — 25800030 ZZH RX IP 258 OP 636: Performed by: ORTHOPAEDIC SURGERY

## 2020-08-26 PROCEDURE — 25000125 ZZHC RX 250: Performed by: NURSE ANESTHETIST, CERTIFIED REGISTERED

## 2020-08-26 PROCEDURE — 37000008 ZZH ANESTHESIA TECHNICAL FEE, 1ST 30 MIN: Performed by: ORTHOPAEDIC SURGERY

## 2020-08-26 PROCEDURE — 25000132 ZZH RX MED GY IP 250 OP 250 PS 637: Performed by: ORTHOPAEDIC SURGERY

## 2020-08-26 PROCEDURE — 27810169 ZZH OR IMPLANT GENERAL: Performed by: ORTHOPAEDIC SURGERY

## 2020-08-26 PROCEDURE — 25000128 H RX IP 250 OP 636: Performed by: ORTHOPAEDIC SURGERY

## 2020-08-26 PROCEDURE — 36000063 ZZH SURGERY LEVEL 4 EA 15 ADDTL MIN: Performed by: ORTHOPAEDIC SURGERY

## 2020-08-26 PROCEDURE — 97161 PT EVAL LOW COMPLEX 20 MIN: CPT | Mod: GP | Performed by: PHYSICAL THERAPIST

## 2020-08-26 PROCEDURE — 25000128 H RX IP 250 OP 636: Performed by: NURSE ANESTHETIST, CERTIFIED REGISTERED

## 2020-08-26 DEVICE — IMPLANTABLE DEVICE
Type: IMPLANTABLE DEVICE | Site: KNEE | Status: FUNCTIONAL
Brand: VANGUARD® KNEE SYSTEM

## 2020-08-26 DEVICE — SIMPLEX® HV IS A FAST-SETTING ACRYLIC RESIN FOR USE IN BONE SURGERY. MIXING THE TWO SEPARATE STERILE COMPONENTS PRODUCES A DUCTILE BONE CEMENT WHICH, AFTER HARDENING, FIXES THE IMPLANT AND TRANSFERS STRESSES PRODUCED DURING MOVEMENT EVENLY TO THE BONE. SIMPLEX® HV CEMENT POWDER ALSO CONTAINS INSOLUBLE ZIRCONIUM DIOXIDE AS AN X-RAY CONTRAST MEDIUM. SIMPLEX® HV DOES NOT EMIT A SIGNAL AND DOES NOT POSE A SAFETY RISK IN A MAGNETIC RESONANCE ENVIRONMENT.
Type: IMPLANTABLE DEVICE | Site: KNEE | Status: FUNCTIONAL
Brand: SIMPLEX HV

## 2020-08-26 DEVICE — IMP INSERT TIBIAL BIOM PS PLUS BEARING 16X71/75MM 183746
Type: IMPLANTABLE DEVICE | Site: KNEE | Status: NON-FUNCTIONAL
Removed: 2024-07-31

## 2020-08-26 DEVICE — IMP COMP PATELLA BIOM 3 PEG 34MM STD 184766: Type: IMPLANTABLE DEVICE | Site: KNEE | Status: FUNCTIONAL

## 2020-08-26 DEVICE — IMP COMP TIBIAL KNEE ASCENT 71MM 141233: Type: IMPLANTABLE DEVICE | Site: KNEE | Status: FUNCTIONAL

## 2020-08-26 DEVICE — IMP COMP FEMORAL VANGARD BIOM FIXATION 183099: Type: IMPLANTABLE DEVICE | Site: KNEE | Status: FUNCTIONAL

## 2020-08-26 RX ORDER — CALCIUM CARBONATE 500 MG/1
1000 TABLET, CHEWABLE ORAL 4 TIMES DAILY PRN
Status: DISCONTINUED | OUTPATIENT
Start: 2020-08-26 | End: 2020-08-27 | Stop reason: HOSPADM

## 2020-08-26 RX ORDER — GABAPENTIN 300 MG/1
900 CAPSULE ORAL 2 TIMES DAILY
Status: DISCONTINUED | OUTPATIENT
Start: 2020-08-26 | End: 2020-08-27 | Stop reason: HOSPADM

## 2020-08-26 RX ORDER — NALOXONE HYDROCHLORIDE 0.4 MG/ML
.1-.4 INJECTION, SOLUTION INTRAMUSCULAR; INTRAVENOUS; SUBCUTANEOUS
Status: DISCONTINUED | OUTPATIENT
Start: 2020-08-26 | End: 2020-08-26

## 2020-08-26 RX ORDER — AMOXICILLIN 250 MG
1-2 CAPSULE ORAL 2 TIMES DAILY
Qty: 30 TABLET | Refills: 0 | Status: ON HOLD | OUTPATIENT
Start: 2020-08-26 | End: 2024-07-30

## 2020-08-26 RX ORDER — ACETAMINOPHEN 325 MG/1
975 TABLET ORAL EVERY 8 HOURS
Status: DISCONTINUED | OUTPATIENT
Start: 2020-08-26 | End: 2020-08-27 | Stop reason: HOSPADM

## 2020-08-26 RX ORDER — CEFAZOLIN SODIUM 2 G/100ML
2 INJECTION, SOLUTION INTRAVENOUS EVERY 8 HOURS
Status: COMPLETED | OUTPATIENT
Start: 2020-08-26 | End: 2020-08-27

## 2020-08-26 RX ORDER — BUDESONIDE AND FORMOTEROL FUMARATE DIHYDRATE 80; 4.5 UG/1; UG/1
2 AEROSOL RESPIRATORY (INHALATION) 2 TIMES DAILY
Status: DISCONTINUED | OUTPATIENT
Start: 2020-08-26 | End: 2020-08-27 | Stop reason: HOSPADM

## 2020-08-26 RX ORDER — LIDOCAINE 40 MG/G
CREAM TOPICAL
Status: DISCONTINUED | OUTPATIENT
Start: 2020-08-26 | End: 2020-08-27 | Stop reason: HOSPADM

## 2020-08-26 RX ORDER — RISPERIDONE 0.5 MG/1
0.5 TABLET ORAL DAILY PRN
Status: DISCONTINUED | OUTPATIENT
Start: 2020-08-26 | End: 2020-08-27 | Stop reason: HOSPADM

## 2020-08-26 RX ORDER — BUPIVACAINE HYDROCHLORIDE 7.5 MG/ML
INJECTION, SOLUTION INTRASPINAL PRN
Status: DISCONTINUED | OUTPATIENT
Start: 2020-08-26 | End: 2020-08-26

## 2020-08-26 RX ORDER — PROPOFOL 10 MG/ML
INJECTION, EMULSION INTRAVENOUS CONTINUOUS PRN
Status: DISCONTINUED | OUTPATIENT
Start: 2020-08-26 | End: 2020-08-26

## 2020-08-26 RX ORDER — LOSARTAN POTASSIUM 100 MG/1
100 TABLET ORAL DAILY
Status: DISCONTINUED | OUTPATIENT
Start: 2020-08-26 | End: 2020-08-27 | Stop reason: HOSPADM

## 2020-08-26 RX ORDER — HYDROMORPHONE HYDROCHLORIDE 1 MG/ML
.3-.5 INJECTION, SOLUTION INTRAMUSCULAR; INTRAVENOUS; SUBCUTANEOUS EVERY 5 MIN PRN
Status: DISCONTINUED | OUTPATIENT
Start: 2020-08-26 | End: 2020-08-26 | Stop reason: HOSPADM

## 2020-08-26 RX ORDER — OXYCODONE HYDROCHLORIDE 5 MG/1
5-10 TABLET ORAL
Status: DISCONTINUED | OUTPATIENT
Start: 2020-08-26 | End: 2020-08-27 | Stop reason: HOSPADM

## 2020-08-26 RX ORDER — ACETAMINOPHEN 500 MG
1000 TABLET ORAL EVERY 6 HOURS PRN
Status: DISCONTINUED | OUTPATIENT
Start: 2020-08-26 | End: 2020-08-26

## 2020-08-26 RX ORDER — FENTANYL CITRATE 50 UG/ML
INJECTION, SOLUTION INTRAMUSCULAR; INTRAVENOUS PRN
Status: DISCONTINUED | OUTPATIENT
Start: 2020-08-26 | End: 2020-08-26

## 2020-08-26 RX ORDER — FUROSEMIDE 20 MG
20 TABLET ORAL EVERY MORNING
Status: DISCONTINUED | OUTPATIENT
Start: 2020-08-27 | End: 2020-08-27 | Stop reason: HOSPADM

## 2020-08-26 RX ORDER — KETOROLAC TROMETHAMINE 30 MG/ML
30 INJECTION, SOLUTION INTRAMUSCULAR; INTRAVENOUS EVERY 6 HOURS PRN
Status: DISCONTINUED | OUTPATIENT
Start: 2020-08-26 | End: 2020-08-27 | Stop reason: HOSPADM

## 2020-08-26 RX ORDER — ALBUTEROL SULFATE 90 UG/1
2 AEROSOL, METERED RESPIRATORY (INHALATION) DAILY PRN
Status: DISCONTINUED | OUTPATIENT
Start: 2020-08-26 | End: 2020-08-27 | Stop reason: HOSPADM

## 2020-08-26 RX ORDER — HYDROCHLOROTHIAZIDE 12.5 MG/1
12.5 CAPSULE ORAL DAILY
Status: DISCONTINUED | OUTPATIENT
Start: 2020-08-26 | End: 2020-08-27 | Stop reason: HOSPADM

## 2020-08-26 RX ORDER — MAGNESIUM HYDROXIDE 1200 MG/15ML
LIQUID ORAL PRN
Status: DISCONTINUED | OUTPATIENT
Start: 2020-08-26 | End: 2020-08-26 | Stop reason: HOSPADM

## 2020-08-26 RX ORDER — HYDROXYZINE HYDROCHLORIDE 25 MG/1
25 TABLET, FILM COATED ORAL EVERY 6 HOURS PRN
Status: DISCONTINUED | OUTPATIENT
Start: 2020-08-26 | End: 2020-08-27 | Stop reason: HOSPADM

## 2020-08-26 RX ORDER — DEXAMETHASONE SODIUM PHOSPHATE 4 MG/ML
4 INJECTION, SOLUTION INTRA-ARTICULAR; INTRALESIONAL; INTRAMUSCULAR; INTRAVENOUS; SOFT TISSUE ONCE
Status: COMPLETED | OUTPATIENT
Start: 2020-08-26 | End: 2020-08-26

## 2020-08-26 RX ORDER — ONDANSETRON 2 MG/ML
4 INJECTION INTRAMUSCULAR; INTRAVENOUS EVERY 30 MIN PRN
Status: DISCONTINUED | OUTPATIENT
Start: 2020-08-26 | End: 2020-08-26 | Stop reason: HOSPADM

## 2020-08-26 RX ORDER — RISPERIDONE 1 MG/1
1 TABLET ORAL 2 TIMES DAILY
Status: DISCONTINUED | OUTPATIENT
Start: 2020-08-26 | End: 2020-08-27 | Stop reason: HOSPADM

## 2020-08-26 RX ORDER — CEFAZOLIN SODIUM 1 G/3ML
1 INJECTION, POWDER, FOR SOLUTION INTRAMUSCULAR; INTRAVENOUS SEE ADMIN INSTRUCTIONS
Status: DISCONTINUED | OUTPATIENT
Start: 2020-08-26 | End: 2020-08-26

## 2020-08-26 RX ORDER — PROPOFOL 10 MG/ML
INJECTION, EMULSION INTRAVENOUS PRN
Status: DISCONTINUED | OUTPATIENT
Start: 2020-08-26 | End: 2020-08-26

## 2020-08-26 RX ORDER — VANCOMYCIN HYDROCHLORIDE 1 G/20ML
INJECTION, POWDER, LYOPHILIZED, FOR SOLUTION INTRAVENOUS PRN
Status: DISCONTINUED | OUTPATIENT
Start: 2020-08-26 | End: 2020-08-26 | Stop reason: HOSPADM

## 2020-08-26 RX ORDER — FENTANYL CITRATE 50 UG/ML
25-50 INJECTION, SOLUTION INTRAMUSCULAR; INTRAVENOUS
Status: DISCONTINUED | OUTPATIENT
Start: 2020-08-26 | End: 2020-08-26 | Stop reason: HOSPADM

## 2020-08-26 RX ORDER — MULTIVITAMIN,THERAPEUTIC
1 TABLET ORAL AT BEDTIME
Status: DISCONTINUED | OUTPATIENT
Start: 2020-08-26 | End: 2020-08-27 | Stop reason: HOSPADM

## 2020-08-26 RX ORDER — FENTANYL CITRATE 50 UG/ML
50 INJECTION, SOLUTION INTRAMUSCULAR; INTRAVENOUS EVERY 5 MIN PRN
Status: DISCONTINUED | OUTPATIENT
Start: 2020-08-26 | End: 2020-08-27 | Stop reason: HOSPADM

## 2020-08-26 RX ORDER — NALOXONE HYDROCHLORIDE 0.4 MG/ML
.1-.4 INJECTION, SOLUTION INTRAMUSCULAR; INTRAVENOUS; SUBCUTANEOUS
Status: DISCONTINUED | OUTPATIENT
Start: 2020-08-26 | End: 2020-08-27 | Stop reason: HOSPADM

## 2020-08-26 RX ORDER — ACETAMINOPHEN 325 MG/1
650 TABLET ORAL EVERY 4 HOURS PRN
Qty: 100 TABLET | Refills: 0 | Status: ON HOLD | OUTPATIENT
Start: 2020-08-26 | End: 2024-08-01

## 2020-08-26 RX ORDER — ONDANSETRON 4 MG/1
4 TABLET, ORALLY DISINTEGRATING ORAL EVERY 30 MIN PRN
Status: DISCONTINUED | OUTPATIENT
Start: 2020-08-26 | End: 2020-08-26 | Stop reason: HOSPADM

## 2020-08-26 RX ORDER — IBUPROFEN 600 MG/1
600 TABLET, FILM COATED ORAL EVERY 6 HOURS PRN
Qty: 30 TABLET | Refills: 0 | Status: ON HOLD | COMMUNITY
Start: 2020-08-26 | End: 2024-07-30

## 2020-08-26 RX ORDER — OXYCODONE HYDROCHLORIDE 5 MG/1
5-10 TABLET ORAL EVERY 6 HOURS PRN
Qty: 60 TABLET | Refills: 0 | Status: ON HOLD | OUTPATIENT
Start: 2020-08-26 | End: 2020-08-30

## 2020-08-26 RX ORDER — HYDROMORPHONE HYDROCHLORIDE 1 MG/ML
.3-.5 INJECTION, SOLUTION INTRAMUSCULAR; INTRAVENOUS; SUBCUTANEOUS
Status: DISCONTINUED | OUTPATIENT
Start: 2020-08-26 | End: 2020-08-27 | Stop reason: HOSPADM

## 2020-08-26 RX ORDER — TRAZODONE HYDROCHLORIDE 50 MG/1
100 TABLET, FILM COATED ORAL AT BEDTIME
Status: DISCONTINUED | OUTPATIENT
Start: 2020-08-26 | End: 2020-08-27 | Stop reason: HOSPADM

## 2020-08-26 RX ORDER — SODIUM CHLORIDE 9 MG/ML
INJECTION, SOLUTION INTRAVENOUS CONTINUOUS
Status: DISCONTINUED | OUTPATIENT
Start: 2020-08-26 | End: 2020-08-27 | Stop reason: HOSPADM

## 2020-08-26 RX ORDER — LIDOCAINE HYDROCHLORIDE 20 MG/ML
INJECTION, SOLUTION INFILTRATION; PERINEURAL PRN
Status: DISCONTINUED | OUTPATIENT
Start: 2020-08-26 | End: 2020-08-26

## 2020-08-26 RX ORDER — SODIUM CHLORIDE, SODIUM LACTATE, POTASSIUM CHLORIDE, CALCIUM CHLORIDE 600; 310; 30; 20 MG/100ML; MG/100ML; MG/100ML; MG/100ML
INJECTION, SOLUTION INTRAVENOUS CONTINUOUS
Status: DISCONTINUED | OUTPATIENT
Start: 2020-08-26 | End: 2020-08-26 | Stop reason: HOSPADM

## 2020-08-26 RX ORDER — ONDANSETRON 4 MG/1
4-8 TABLET, ORALLY DISINTEGRATING ORAL EVERY 8 HOURS PRN
Qty: 4 TABLET | Refills: 0 | Status: ON HOLD | OUTPATIENT
Start: 2020-08-26 | End: 2024-07-30

## 2020-08-26 RX ORDER — ONDANSETRON 2 MG/ML
INJECTION INTRAMUSCULAR; INTRAVENOUS PRN
Status: DISCONTINUED | OUTPATIENT
Start: 2020-08-26 | End: 2020-08-26

## 2020-08-26 RX ORDER — CEFAZOLIN SODIUM 2 G/100ML
2 INJECTION, SOLUTION INTRAVENOUS
Status: COMPLETED | OUTPATIENT
Start: 2020-08-26 | End: 2020-08-26

## 2020-08-26 RX ORDER — SODIUM CHLORIDE, SODIUM LACTATE, POTASSIUM CHLORIDE, CALCIUM CHLORIDE 600; 310; 30; 20 MG/100ML; MG/100ML; MG/100ML; MG/100ML
INJECTION, SOLUTION INTRAVENOUS CONTINUOUS
Status: DISCONTINUED | OUTPATIENT
Start: 2020-08-26 | End: 2020-08-27 | Stop reason: HOSPADM

## 2020-08-26 RX ADMIN — TRAZODONE HYDROCHLORIDE 100 MG: 50 TABLET ORAL at 21:25

## 2020-08-26 RX ADMIN — OXYCODONE HYDROCHLORIDE 10 MG: 5 TABLET ORAL at 22:14

## 2020-08-26 RX ADMIN — GABAPENTIN 900 MG: 300 CAPSULE ORAL at 21:25

## 2020-08-26 RX ADMIN — FENTANYL CITRATE 50 MCG: 50 INJECTION, SOLUTION INTRAMUSCULAR; INTRAVENOUS at 09:32

## 2020-08-26 RX ADMIN — LIDOCAINE HYDROCHLORIDE 40 MG: 20 INJECTION, SOLUTION INFILTRATION; PERINEURAL at 10:07

## 2020-08-26 RX ADMIN — MIDAZOLAM 1 MG: 1 INJECTION INTRAMUSCULAR; INTRAVENOUS at 10:07

## 2020-08-26 RX ADMIN — FLUOXETINE 60 MG: 20 CAPSULE ORAL at 21:25

## 2020-08-26 RX ADMIN — THERA TABS 1 TABLET: TAB at 21:25

## 2020-08-26 RX ADMIN — SODIUM CHLORIDE: 9 INJECTION, SOLUTION INTRAVENOUS at 14:51

## 2020-08-26 RX ADMIN — MIDAZOLAM HYDROCHLORIDE 1 MG: 1 INJECTION, SOLUTION INTRAMUSCULAR; INTRAVENOUS at 09:31

## 2020-08-26 RX ADMIN — ONDANSETRON 4 MG: 2 INJECTION INTRAMUSCULAR; INTRAVENOUS at 11:18

## 2020-08-26 RX ADMIN — FENTANYL CITRATE 50 MCG: 50 INJECTION, SOLUTION INTRAMUSCULAR; INTRAVENOUS at 10:38

## 2020-08-26 RX ADMIN — BUPIVACAINE HYDROCHLORIDE IN DEXTROSE 12 MG: 7.5 INJECTION, SOLUTION SUBARACHNOID at 10:03

## 2020-08-26 RX ADMIN — PROPOFOL 75 MCG/KG/MIN: 10 INJECTION, EMULSION INTRAVENOUS at 10:07

## 2020-08-26 RX ADMIN — RISPERIDONE 1 MG: 1 TABLET ORAL at 17:25

## 2020-08-26 RX ADMIN — HYDROMORPHONE HYDROCHLORIDE 0.5 MG: 1 INJECTION, SOLUTION INTRAMUSCULAR; INTRAVENOUS; SUBCUTANEOUS at 14:58

## 2020-08-26 RX ADMIN — HYDROMORPHONE HYDROCHLORIDE 0.5 MG: 1 INJECTION, SOLUTION INTRAMUSCULAR; INTRAVENOUS; SUBCUTANEOUS at 19:56

## 2020-08-26 RX ADMIN — ACETAMINOPHEN 975 MG: 325 TABLET, FILM COATED ORAL at 21:25

## 2020-08-26 RX ADMIN — SODIUM CHLORIDE, POTASSIUM CHLORIDE, SODIUM LACTATE AND CALCIUM CHLORIDE: 600; 310; 30; 20 INJECTION, SOLUTION INTRAVENOUS at 11:36

## 2020-08-26 RX ADMIN — CEFAZOLIN SODIUM 2 G: 2 INJECTION, SOLUTION INTRAVENOUS at 10:09

## 2020-08-26 RX ADMIN — CEFAZOLIN SODIUM 2 G: 2 INJECTION, SOLUTION INTRAVENOUS at 17:25

## 2020-08-26 RX ADMIN — OXYCODONE HYDROCHLORIDE 5 MG: 5 TABLET ORAL at 17:30

## 2020-08-26 RX ADMIN — SODIUM CHLORIDE, POTASSIUM CHLORIDE, SODIUM LACTATE AND CALCIUM CHLORIDE: 600; 310; 30; 20 INJECTION, SOLUTION INTRAVENOUS at 09:29

## 2020-08-26 RX ADMIN — ACETAMINOPHEN 975 MG: 325 TABLET, FILM COATED ORAL at 14:52

## 2020-08-26 RX ADMIN — PROPOFOL 30 MG: 10 INJECTION, EMULSION INTRAVENOUS at 10:07

## 2020-08-26 RX ADMIN — ASPIRIN 325 MG: 325 TABLET, COATED ORAL at 17:25

## 2020-08-26 RX ADMIN — DEXAMETHASONE SODIUM PHOSPHATE 4 MG: 4 INJECTION, SOLUTION INTRAMUSCULAR; INTRAVENOUS at 14:52

## 2020-08-26 RX ADMIN — BUDESONIDE AND FORMOTEROL FUMARATE DIHYDRATE 2 PUFF: 80; 4.5 AEROSOL RESPIRATORY (INHALATION) at 21:32

## 2020-08-26 RX ADMIN — FENTANYL CITRATE 50 MCG: 50 INJECTION, SOLUTION INTRAMUSCULAR; INTRAVENOUS at 10:07

## 2020-08-26 RX ADMIN — BUPIVACAINE HYDROCHLORIDE 20 ML GIVEN: 5 INJECTION, SOLUTION EPIDURAL; INTRACAUDAL; PERINEURAL at 09:47

## 2020-08-26 SDOH — HEALTH STABILITY: MENTAL HEALTH: CURRENT SMOKER: 1

## 2020-08-26 ASSESSMENT — MIFFLIN-ST. JEOR: SCORE: 1596.93

## 2020-08-26 ASSESSMENT — ENCOUNTER SYMPTOMS: SEIZURES: 0

## 2020-08-26 ASSESSMENT — COPD QUESTIONNAIRES
COPD: 1
CAT_SEVERITY: MODERATE

## 2020-08-26 ASSESSMENT — LIFESTYLE VARIABLES: TOBACCO_USE: 1

## 2020-08-26 ASSESSMENT — ACTIVITIES OF DAILY LIVING (ADL): WHICH_OF_THE_ABOVE_FUNCTIONAL_RISKS_HAD_A_RECENT_ONSET_OR_CHANGE?: AMBULATION;TRANSFERRING

## 2020-08-26 NOTE — ANESTHESIA CARE TRANSFER NOTE
Patient: Helga Geronimo    Procedure(s):  LEFT TOTAL KNEE ARTHROPLASTY    Diagnosis: Left knee DJD [M17.12]  Diagnosis Additional Information: No value filed.    Anesthesia Type:   Spinal, Peripheral Nerve Block     Note:  Airway :Face Mask  Patient transferred to:PACU  Comments: At end of procedure, spontaneous respirations, patient alert to voice, able to follow commands. Oxygen via facemask at 6 liters per minute to PACU. Oxygen tubing connected to wall O2 in PACU, SpO2, NiBP, and EKG monitors and alarms on and functioning, Shante Hugger warmer connected to patient gown, report on patient's clinical status given to PACU RN, RN questions answered.Handoff Report: Identifed the Patient, Identified the Reponsible Provider, Reviewed the pertinent medical history, Discussed the surgical course, Reviewed Intra-OP anesthesia mangement and issues during anesthesia, Set expectations for post-procedure period and Allowed opportunity for questions and acknowledgement of understanding      Vitals: (Last set prior to Anesthesia Care Transfer)    CRNA VITALS  8/26/2020 1121 - 8/26/2020 1156      8/26/2020             NIBP:  99/70    NIBP Mean:  85                Electronically Signed By: CHRIS Zhu CRNA  August 26, 2020  11:56 AM

## 2020-08-26 NOTE — BRIEF OP NOTE
[facility]    Brief Operative Note    Pre-operative diagnosis: Left knee DJD [M17.12]  Post-operative diagnosis Same as pre-operative diagnosis    Procedure: Procedure(s):  LEFT TOTAL KNEE ARTHROPLASTY  Surgeon: Surgeon(s) and Role:     * Shola Yu MD - Primary  Anesthesia: General   Estimated blood loss: Less than 50 ml  Drains: Hemovac  Specimens: * No specimens in log *  Findings:   None.  Complications: None.  Implants: * No implants in log *

## 2020-08-26 NOTE — BRIEF OP NOTE
[facility]    Brief Operative Note    Pre-operative diagnosis: Left knee DJD [M17.12]  Post-operative diagnosis Same as pre-operative diagnosis    Procedure: Procedure(s):  LEFT TOTAL KNEE ARTHROPLASTY  Surgeon: Surgeon(s) and Role:     * Shola Yu MD - Primary      Gallito Adam-sindi  Anesthesia: General   Estimated blood loss: Less than 50 ml  Drains: Hemovac  Specimens: * No specimens in log *  Findings:   None.  Complications: None.  Implants: * No implants in log *

## 2020-08-26 NOTE — ANESTHESIA POSTPROCEDURE EVALUATION
Patient: Helga Geronimo    Procedure(s):  LEFT TOTAL KNEE ARTHROPLASTY    Diagnosis:Left knee DJD [M17.12]  Diagnosis Additional Information: No value filed.    Anesthesia Type:  Spinal, Peripheral Nerve Block    Note:  Anesthesia Post Evaluation    Patient location during evaluation: PACU  Patient participation: Able to participate in evaluation but full recovery from regional anesthesia has not yet ocurrred but is anticipated to occur within 48 hours  Level of consciousness: awake and alert  Pain management: adequate  Airway patency: patent  Cardiovascular status: acceptable and stable  Respiratory status: acceptable, spontaneous ventilation, unassisted and nonlabored ventilation  Hydration status: acceptable  PONV: none             Last vitals:  Vitals:    08/26/20 1250 08/26/20 1300 08/26/20 1310   BP: (!) 148/81 (!) 144/78 (!) 143/76   Pulse: 84 82 86   Resp: 15 16 20   Temp:      SpO2: 95% 95% 96%         Electronically Signed By: Matt Obando MD  August 26, 2020  1:18 PM

## 2020-08-26 NOTE — PLAN OF CARE
Patient plan: return home with daughter assist  Current status: PT: Order received; Initial evaluation completed and treatment initiated POD #0 s/p L TKA; prior to admit patient reports independence with mobility with use of a walker; no recent falls; lives in an apt with 1 step down to her living room; Has a walker for discharge; On eval patient still has some residual decreased sensation in L LE; pain 3/10; able to participate in all TKA ex's with assist; able to perform bed mobility with min A; sit>stand with min A and walker; use of KI as patient unable to SLR gait in hallway with min/CGA and walker use; ambulated in hallway x 60 feet. Started to feel dizzy; sat in wheelchair; able to ambulate 20 feet back to bedside; min A to sit safely; mod A for L LE into bed.  Anticipated status at discharge: Anticipate patient will be SBA for transfers and gait with use of a walker; min A for step into living room initially and independent with bed mobility. Would like to have Home PT prior to OP PT

## 2020-08-26 NOTE — PLAN OF CARE
Patient arrived from PACU at 1345.  Up with 1 and walker.  Voided X1 using bedside commode.  IV dilaudid given X1.  Started oxycodone with dinner, tolerated regular diet.  Progressing per plan of care.

## 2020-08-26 NOTE — OP NOTE
Procedure Date: 08/26/2020      PREOPERATIVE DIAGNOSIS:  End-stage arthrosis, left knee.      POSTOPERATIVE DIAGNOSIS:  End-stage arthrosis, left knee.      PROCEDURE:  Left total knee arthroplasty.      SURGEON:  Shola Yu MD.      ASSISTANT:  AMY Blankenship.      ANESTHESIA:  Spinal plus nerve block.      ESTIMATED BLOOD LOSS:  50 mL      COMPLICATIONS:  None.      IMPLANT:  Biomet Vanguard knee size 65 femur posterior stabilized.  The sizes were size 71 tibial tray with a cruciate tibial plate; 34 mm round all polyethylene patellar button, 67.5 femoral component; 2 distal pegs and polyethylene insert was a 16 mm thick fitting 71/75.      ANTIBIOTICS:  Given preop, 2 grams Ancef, 24 hours dosing postop plan.      DEEP VENOUS THROMBOSIS PROPHYLAXIS:  Aspirin therapy, sequential calf compression devices, EDU hose, all of which will begin when appropriate postop.      MEDICATIONS:  1 gram powdered vancomycin placed into the joint prior to closure, tranexamic acid 1 gm in 30 mL of saline and pain cocktail from [facility], small volume.      DRAINS:  One Hemovac.  Fuchs catheter:  None.      DESCRIPTION OF PROCEDURE:  Risks, options and alternatives were gone over with the patient, identification made and brought into the OR.  An appropriate timeout was taken, prepped and draped in the usual fashion.      After a timeout, the tourniquet was inflated to 400 after exsanguination.  A midline incision made, medial parapatellar arthrotomy, full-thickness flaps were made.  Resected the patella, sized to a 34.  Very osteoporotic bone.  Exposing the femur, end-stage arthrosis of the patellofemoral joint and the medial compartment.  Intramedullary guiding system was utilized to produce a 5 degree valgus cut of the distal femur, referencing across the epicondylar axis.  Anterior and posterior chamfers made.  Box opening placed.  Made sure no notching.  Marginal osteophytes were trimmed away and posterior  osteophytes.  The tibia was then exposed, referencing preoperative templating was a 1:3 ratio resection and centering over the talus.  Identification made and resected it.  Reflected it off.  Skeletonized it to the mid coronal plane on both sides and eventually to the posteromedial corner plane because of the very fixed varus.      Drilled cement anchoring holes in the tibia, put a bone plug in the distal femur.  Trials were done.  Withdrew those.  Cemented all 3 components in simultaneously.  Injected about 30 mL of the pain cocktail under direct vision with 1-2 mm increments in the posterior capsule.  Once the cement cured, went through a final series of trials, went with a size 16.      The component was a Biomet Vanguard knee size 67.5 femur, 71 tibial tray, insert was a 16 mm thick posterior stabilized plus and the patella was a 34 button.  Closure was then done with the knee held physiologically flexed.  Multiple interrupted 0 Ethibonds followed by running 1 Ethibond.  Put vancomycin in the capsule before closure.  Then the drain was brought out, but left off suction.  A running 0 Vicryl followed by 2-0 Vicryls, staples and 0 Prolene on the skin.  Pressure applied from toes to groin.  The patient was transferred awake and alert to the recovery room.      TOURNIQUET TIME:  About 75 minutes, but check anesthetic record for specific details if needed.        No complications.  Pressure applied.  The patient was transferred awake and alert to the recovery room.         ABIGAIL BARBER MD             D: 2020   T: 2020   MT: LIZZ      Name:     LOREE BECK   MRN:      [id number]        Account:        [id number]   :      1950           Procedure Date: 2020      Document: [id number]

## 2020-08-26 NOTE — ANESTHESIA PROCEDURE NOTES
Procedure note : intrathecal  Staff -   Anesthesiologist:  Matt Obando MD      Performed By: anesthesiologist        Pre-Procedure  Performed by Matt Obando MD  Location: OR      Pre-Anesthestic Checklist: patient identified, IV checked, risks and benefits discussed, informed consent, monitors and equipment checked and pre-op evaluation    Timeout  Correct Patient: Yes   Correct Procedure: Yes   Correct Site: Yes   Correct Laterality: N/A   Correct Position: Yes   Site Marked: N/A   .   Procedure Documentation    .    Procedure: intrathecal, .   Patient Position:sitting Insertion Site:L4-5  (midline approach)     Patient Prep/Sterile Barriers; mask, sterile gloves, chlorhexidine gluconate and isopropyl alcohol, patient draped.  .  Needle:  Spinal Needle (gauge): 25  Spinal/LP Needle Length (inches): 3.5 # of attempts: 1 and # of redirects:  Introducer used Introducer: 20 G .        Assessment/Narrative  Paresthesias: No.  .  .  clear CSF fluid removed . Comments:  Patient tolerated well.   Pre/Post aspiration.   No complications.   0.75% Bupivacaine documented on record.

## 2020-08-26 NOTE — PROGRESS NOTES
08/26/20 1605   Quick Adds   Type of Visit Initial PT Evaluation   Living Environment   Lives With alone   Living Arrangements apartment   Home Accessibility stairs within home   Number of Stairs, Within Home, Primary 1  (into sunken living room)   Stair Railings, Within Home, Primary none   Transportation Anticipated family or friend will provide   Living Environment Comment patient lives in an apt; elevator access; sunken living room   Self-Care   Usual Activity Tolerance moderate   Current Activity Tolerance moderate   Regular Exercise No   Equipment Currently Used at Home walker, rolling   Activity/Exercise/Self-Care Comment normally independent with mobility and cares; recent use of a walker   Functional Level Prior   Ambulation 1-->assistive equipment  (walker)   Transferring 1-->assistive equipment  (walker)   Cognition 0 - no cognition issues reported   Fall history within last six months no   Which of the above functional risks had a recent onset or change? ambulation;transferring   Prior Functional Level Comment patient normally independent with recent use of walker   General Information   Onset of Illness/Injury or Date of Surgery - Date 08/26/20   Referring Physician Shola Yu MD   Patient/Family Goals Statement return home with assist of daughter   Pertinent History of Current Problem (include personal factors and/or comorbidities that impact the POC) Patient with L knee OA who underwent L TKA today; see medical record for further information   Precautions/Limitations fall precautions   Weight-Bearing Status - LLE weight-bearing as tolerated   General Observations patient in bed; daughter present   General Info Comments Activity: Ambulate with assist   Cognitive Status Examination   Orientation orientation to person, place and time   Level of Consciousness alert;lethargic/somnolent   Follows Commands and Answers Questions 100% of the time   Personal Safety and Judgment intact   Memory intact    Pain Assessment   Patient Currently in Pain Yes, see Vital Sign flowsheet  (3/10)   Integumentary/Edema   Integumentary/Edema Comments L knee incision; covered; drain present   Range of Motion (ROM)   ROM Comment L knee limited by recent surgery and pain; others appear WFL   Strength   Strength Comments L knee limited by recent surgery and pain; others appear WFL; further limited by decreased sensation   Bed Mobility   Bed Mobility Comments min A for supine to sit; mod A for L LE back into bed   Transfer Skills   Transfer Comments sit>stand with min/CGA and safety cues; use of walker   Gait   Gait Comments able to ambulate > 25 feet with a rolling walker and min A    Balance   Balance Comments current need for walker and assist; no LOB   Sensory Examination   Sensory Perception Comments still with some decreased sensation in L LE   Modality Interventions   Planned Modality Interventions Comments ice to L knee   General Therapy Interventions   Planned Therapy Interventions bed mobility training;ROM;strengthening;stretching;transfer training;progressive activity/exercise   Clinical Impression   Criteria for Skilled Therapeutic Intervention yes, treatment indicated   PT Diagnosis impaired gait/transfers   Influenced by the following impairments pain; decreased knee ROM/strength; dizziness   Functional limitations due to impairments impaired independence with functional mobility   Clinical Presentation Stable/Uncomplicated   Clinical Presentation Rationale clinical judgement; level of assist   Clinical Decision Making (Complexity) Low complexity   Therapy Frequency 2x/day   Predicted Duration of Therapy Intervention (days/wks) 2days   Anticipated Equipment Needs at Discharge front wheeled walker   Anticipated Discharge Disposition Other (see comments)  (defer to Ortho surgeon/PA)   Risk & Benefits of therapy have been explained Yes   Patient, Family & other staff in agreement with plan of care Yes   [facility]  "AM-PAC TM \"6 Clicks\"   2016, Trustees of [facility], under license to [employer].  All rights reserved.   6 Clicks Short Forms Basic Mobility Inpatient Short Form   [facility]  \"6 Clicks\" V.2 Basic Mobility Inpatient Short Form   1. Turning from your back to your side while in a flat bed without using bedrails? 3 - A Little   2. Moving from lying on your back to sitting on the side of a flat bed without using bedrails? 3 - A Little   3. Moving to and from a bed to a chair (including a wheelchair)? 3 - A Little   4. Standing up from a chair using your arms (e.g., wheelchair, or bedside chair)? 3 - A Little   5. To walk in hospital room? 3 - A Little   6. Climbing 3-5 steps with a railing? 2 - A Lot   Basic Mobility Raw Score (Score out of 24.Lower scores equate to lower levels of function) 17   Total Evaluation Time   Total Evaluation Time (Minutes) 10     "

## 2020-08-26 NOTE — ANESTHESIA PREPROCEDURE EVALUATION
Anesthesia Pre-Procedure Evaluation    Patient: Helga Geronimo   MRN: [id number] : 1950          Preoperative Diagnosis: Left knee DJD [M17.12]    Procedure(s):  LEFT TOTAL KNEE ARTHROPLASTY    Past Medical History:   Diagnosis Date     Anxiety      Anxiety      Arthritis     severe degenerative-low back     Back pain, chronic      Bilateral lower extremity edema      Chronic seasonal allergic rhinitis      COPD (chronic obstructive pulmonary disease) (H)      Depression      Depressive disorder      Hypertension      Intermittent asthma      Iron deficiency anemia      Morbid obesity (H)      OA (osteoarthritis)      Statin myopathy      Tobacco use disorder      Tubular adenoma      Vitamin B12 deficiency      Past Surgical History:   Procedure Laterality Date     ARTHROPLASTY SHOULDER Left 2019    Procedure: LEFT TOTAL SHOULDER ARTHROPLASTY;  Surgeon: Shola Yu MD;  Location:  OR     bilat hip replacements        EYE SURGERY       GYN SURGERY       HYSTERECTOMY       JOINT REPLACEMENT         Anesthesia Evaluation     .             ROS/MED HX    ENT/Pulmonary:     (+)DUANE risk factors hypertension, obese, tobacco use, moderate COPD, , . .    Neurologic:      (-) seizures and CVA   Cardiovascular:     (+) Dyslipidemia, hypertension----. : . . . :. .      (-) CAD   METS/Exercise Tolerance:  >4 METS   Hematologic:     (+) Anemia, -      Musculoskeletal:   (+) arthritis,  -       GI/Hepatic:        (-) GERD   Renal/Genitourinary:     (+) chronic renal disease, type: CRI, Pt does not require dialysis, Pt has no history of transplant,       Endo:     (+) Obesity, .   (-) Type I DM and Type II DM   Psychiatric:     (+) psychiatric history anxiety and depression      Infectious Disease:         Malignancy:         Other:                          Physical Exam  Normal systems: dental    Airway   Mallampati: III  TM distance: >3 FB  Neck ROM: full    Dental     Cardiovascular   Rhythm and rate:  "regular      Pulmonary    breath sounds clear to auscultation            Lab Results   Component Value Date    WBC 8.9 11/21/2019    HGB 10.2 (L) 11/21/2019    HCT 32.3 (L) 11/21/2019     11/21/2019     11/21/2019    POTASSIUM 3.6 11/21/2019    CHLORIDE 107 11/21/2019    CO2 31 11/21/2019    BUN 16 11/21/2019    CR 0.97 11/21/2019     (H) 11/21/2019    BRAYAN 7.5 (L) 11/21/2019    ALBUMIN 3.5 05/02/2017    PROTTOTAL 6.9 05/02/2017    ALT 20 05/02/2017    AST 13 05/02/2017    ALKPHOS 65 05/02/2017    BILITOTAL 0.4 05/02/2017    LIPASE 34 12/09/2009    PTT 30 12/02/2009    INR 1.25 (H) 12/10/2009       Preop Vitals  BP Readings from Last 3 Encounters:   11/21/19 105/55   05/02/17 123/66   06/17/16 118/72    Pulse Readings from Last 3 Encounters:   11/21/19 90   05/02/17 81   06/17/16 88      Resp Readings from Last 3 Encounters:   11/21/19 16   05/02/17 16   06/17/16 20    SpO2 Readings from Last 3 Encounters:   11/21/19 90%   05/02/17 97%   06/17/16 98%      Temp Readings from Last 1 Encounters:   11/21/19 36.7  C (98  F) (Oral)    Ht Readings from Last 1 Encounters:   11/20/19 1.626 m (5' 4\")      Wt Readings from Last 1 Encounters:   11/20/19 112.5 kg (248 lb)    Estimated body mass index is 42.57 kg/m  as calculated from the following:    Height as of 11/20/19: 1.626 m (5' 4\").    Weight as of 11/20/19: 112.5 kg (248 lb).       Anesthesia Plan      History & Physical Review  History and physical reviewed and following examination; no interval change.    ASA Status:  3 .    NPO Status:  > 8 hours    Plan for Spinal and Peripheral Nerve Block with Intravenous and Propofol induction.   PONV prophylaxis:  Ondansetron (or other 5HT-3) and Dexamethasone or Solumedrol    The patient is a current Smoker and Patient was instructed to abstain from smoking on day of procedure     Postoperative Care  Postoperative pain management:  Multi-modal analgesia.      Consents  Anesthetic plan, risks, benefits and " alternatives discussed with:  Patient.  Use of blood products discussed: No .   .                 Matt Obando MD

## 2020-08-26 NOTE — ANESTHESIA PROCEDURE NOTES
Procedure note : Femoral and Adductor canal  Staff -   Anesthesiologist:  Matt Obando MD      Performed By: anesthesiologist        Pre-Procedure  Performed by Matt Obando MD  Location: pre-op      Pre-Anesthestic Checklist: patient identified, IV checked, site marked, risks and benefits discussed, informed consent, monitors and equipment checked, pre-op evaluation, at physician/surgeon's request and post-op pain management    Timeout  Correct Patient: Yes   Correct Procedure: Yes   Correct Site: Yes   Correct Laterality: Yes   Correct Position: Yes   Site Marked: Yes   .   Procedure Documentation    .    Procedure: Femoral and Adductor canal, left.   Patient Position:supine   Ultrasound used to identify targeted nerve, plexus, or vascular marker and placed a needle adjacent to it., Ultrasound was used to visualize the spread of the anesthetic in close proximity to the above stated nerve. A permanent image is entered into the patient's record.  Patient Prep/Sterile Barriers; mask, chlorhexidine gluconate and isopropyl alcohol.  .  Needle: short bevel, insulated   Needle Gauge: 21.  Needle Length (millimeters) 80  .        Assessment/Narrative  Paresthesias: No.  .  The placement was negative for: blood aspirated, painful injection and site bleeding.  . Comments:  20 cc of 0.5% Bupivacaine with epinephrine (1:400K) injected on the anterior side of the femoral artery, in close proximity to the femoral nerve branches via the adductor canal approach.      Ultrasound Interpretation, Peripheral Nerve Block    1. Under ultrasound guidance, the needle was inserted and placed in close proximity to the target nerve(s).  2. Ultrasound was also used to visualize the spread of the anesthetic in close proximity to the nerve(s) being blocked.  Local anesthetic was administered in incremental doses, with intermittent negative aspiration.    3. The nerve(s) appeared anatomically normal.  4. There were no apparent  abnormal pathological findings.  5. A permanent ultrasound image was saved in the patient's record.    Pt tolerated well.    No complications.      The surgeon has given a verbal order transferring care of this patient to me for the performance of a regional analgesia block for post-op pain control. It is requested of me because I am uniquely trained and qualified to perform this block and the surgeon is neither trained nor qualified to perform this procedure.

## 2020-08-27 ENCOUNTER — APPOINTMENT (OUTPATIENT)
Dept: PHYSICAL THERAPY | Facility: CLINIC | Age: 70
End: 2020-08-27
Attending: ORTHOPAEDIC SURGERY
Payer: COMMERCIAL

## 2020-08-27 ENCOUNTER — APPOINTMENT (OUTPATIENT)
Dept: OCCUPATIONAL THERAPY | Facility: CLINIC | Age: 70
End: 2020-08-27
Attending: ORTHOPAEDIC SURGERY
Payer: COMMERCIAL

## 2020-08-27 VITALS
SYSTOLIC BLOOD PRESSURE: 134 MMHG | RESPIRATION RATE: 18 BRPM | TEMPERATURE: 98.6 F | DIASTOLIC BLOOD PRESSURE: 53 MMHG | BODY MASS INDEX: 42.46 KG/M2 | HEIGHT: 64 IN | HEART RATE: 58 BPM | WEIGHT: 248.68 LBS | OXYGEN SATURATION: 90 %

## 2020-08-27 LAB
ANION GAP SERPL CALCULATED.3IONS-SCNC: 2 MMOL/L (ref 3–14)
BUN SERPL-MCNC: 13 MG/DL (ref 7–30)
CALCIUM SERPL-MCNC: 8 MG/DL (ref 8.5–10.1)
CHLORIDE SERPL-SCNC: 107 MMOL/L (ref 94–109)
CO2 SERPL-SCNC: 31 MMOL/L (ref 20–32)
CREAT SERPL-MCNC: 0.92 MG/DL (ref 0.52–1.04)
GFR SERPL CREATININE-BSD FRML MDRD: 64 ML/MIN/{1.73_M2}
GLUCOSE SERPL-MCNC: 124 MG/DL (ref 70–99)
HGB BLD-MCNC: 11.6 G/DL (ref 11.7–15.7)
POTASSIUM SERPL-SCNC: 4.1 MMOL/L (ref 3.4–5.3)
SODIUM SERPL-SCNC: 140 MMOL/L (ref 133–144)

## 2020-08-27 PROCEDURE — 25000132 ZZH RX MED GY IP 250 OP 250 PS 637: Performed by: ORTHOPAEDIC SURGERY

## 2020-08-27 PROCEDURE — 97530 THERAPEUTIC ACTIVITIES: CPT | Mod: GP

## 2020-08-27 PROCEDURE — 97110 THERAPEUTIC EXERCISES: CPT | Mod: GP

## 2020-08-27 PROCEDURE — 80048 BASIC METABOLIC PNL TOTAL CA: CPT | Performed by: ORTHOPAEDIC SURGERY

## 2020-08-27 PROCEDURE — 36415 COLL VENOUS BLD VENIPUNCTURE: CPT | Performed by: ORTHOPAEDIC SURGERY

## 2020-08-27 PROCEDURE — 97110 THERAPEUTIC EXERCISES: CPT | Mod: GP | Performed by: PHYSICAL THERAPY ASSISTANT

## 2020-08-27 PROCEDURE — 97116 GAIT TRAINING THERAPY: CPT | Mod: GP

## 2020-08-27 PROCEDURE — 25000128 H RX IP 250 OP 636: Performed by: ORTHOPAEDIC SURGERY

## 2020-08-27 PROCEDURE — 97165 OT EVAL LOW COMPLEX 30 MIN: CPT | Mod: GO

## 2020-08-27 PROCEDURE — 97530 THERAPEUTIC ACTIVITIES: CPT | Mod: GP | Performed by: PHYSICAL THERAPY ASSISTANT

## 2020-08-27 PROCEDURE — 97116 GAIT TRAINING THERAPY: CPT | Mod: GP | Performed by: PHYSICAL THERAPY ASSISTANT

## 2020-08-27 PROCEDURE — 85018 HEMOGLOBIN: CPT | Performed by: ORTHOPAEDIC SURGERY

## 2020-08-27 PROCEDURE — 97535 SELF CARE MNGMENT TRAINING: CPT | Mod: GO

## 2020-08-27 RX ORDER — HYDROXYZINE PAMOATE 25 MG/1
25 CAPSULE ORAL 3 TIMES DAILY PRN
Qty: 40 CAPSULE | Refills: 0 | Status: ON HOLD | OUTPATIENT
Start: 2020-08-27 | End: 2024-07-30

## 2020-08-27 RX ADMIN — CEFAZOLIN SODIUM 2 G: 2 INJECTION, SOLUTION INTRAVENOUS at 01:32

## 2020-08-27 RX ADMIN — BUDESONIDE AND FORMOTEROL FUMARATE DIHYDRATE 2 PUFF: 80; 4.5 AEROSOL RESPIRATORY (INHALATION) at 08:05

## 2020-08-27 RX ADMIN — HYDROCHLOROTHIAZIDE 12.5 MG: 12.5 CAPSULE, GELATIN COATED ORAL at 08:01

## 2020-08-27 RX ADMIN — RISPERIDONE 1 MG: 1 TABLET ORAL at 08:01

## 2020-08-27 RX ADMIN — LOSARTAN POTASSIUM 100 MG: 100 TABLET, FILM COATED ORAL at 08:02

## 2020-08-27 RX ADMIN — FLUOXETINE 20 MG: 20 CAPSULE ORAL at 08:02

## 2020-08-27 RX ADMIN — GABAPENTIN 900 MG: 300 CAPSULE ORAL at 08:01

## 2020-08-27 RX ADMIN — ALBUTEROL SULFATE 2 PUFF: 90 INHALANT RESPIRATORY (INHALATION) at 08:06

## 2020-08-27 RX ADMIN — ACETAMINOPHEN 975 MG: 325 TABLET, FILM COATED ORAL at 14:58

## 2020-08-27 RX ADMIN — OXYCODONE HYDROCHLORIDE 10 MG: 5 TABLET ORAL at 01:32

## 2020-08-27 RX ADMIN — OXYCODONE HYDROCHLORIDE 10 MG: 5 TABLET ORAL at 11:30

## 2020-08-27 RX ADMIN — ASPIRIN 325 MG: 325 TABLET, COATED ORAL at 08:01

## 2020-08-27 RX ADMIN — HYDROXYZINE HYDROCHLORIDE 25 MG: 25 TABLET ORAL at 10:00

## 2020-08-27 RX ADMIN — ACETAMINOPHEN 975 MG: 325 TABLET, FILM COATED ORAL at 06:41

## 2020-08-27 RX ADMIN — FUROSEMIDE 20 MG: 20 TABLET ORAL at 08:01

## 2020-08-27 RX ADMIN — OXYCODONE HYDROCHLORIDE 10 MG: 5 TABLET ORAL at 07:38

## 2020-08-27 ASSESSMENT — ACTIVITIES OF DAILY LIVING (ADL): PREVIOUS_RESPONSIBILITIES: MEAL PREP;HOUSEKEEPING;LAUNDRY;SHOPPING;MEDICATION MANAGEMENT;FINANCES;DRIVING;WORK

## 2020-08-27 ASSESSMENT — MIFFLIN-ST. JEOR: SCORE: 1630.06

## 2020-08-27 NOTE — PLAN OF CARE
Patient plan: Home with daughter, pt inquiring about home PT  Current status: Engaged in supine TKA exercises with assist & cues, pt needing assist for SLR, needs KI for ambulation. Supine>sit from flat bed with use of rail and SBA, needs Sharri for LEs with sit>supine. Sit>stand x3 during session from bed and WC with Sharri and walker. Ambulated 2x40' with FWW and CGA with WC follow nick slow pace, step to pattern, KI donned. 2x 1 platform step training with initial demo & cue for sequencing, CGA and walker. Discussed plan to practice mobility with daughter assisting at PM session  Anticipated status at discharge: SBA for bed mobility, Sharri transfers with walker, SBA household distance ambulation with walker and KI, SBA platform step with walker, Sharri TKA exercises

## 2020-08-27 NOTE — PLAN OF CARE
Patient plan: Home with daughter, pt inquiring about home PT  Current status:  Pt performed bed mobility with mod assist. Sit to/from stand transfers with min assist.  Gait training 15 ft x 2 using wheeled walker and CGA.  Amb very slowly.  Limited by pain.  Performed 1 platform step x 2 trials using wheeled walker and min assist.  Knee AAROM is 10-74 degrees.  Anticipated status at discharge: Min-mod assist for bed mobility, Sharri transfers with walker, SBA-CGA household distance ambulation with walker and KI, Sharri platform step with walker, Sharri TKA exercises      Pt discharged home today with assist of daughter and Home PT.  PT goals not met.      Physical Therapy Discharge Summary    Reason for therapy discharge:    Discharged to home with home therapy.    Progress towards therapy goal(s). See goals on Care Plan in [facility] electronic health record for goal details.  Goals not met.  Barriers to achieving goals:   discharge from facility.    Therapy recommendation(s):    Continued therapy is recommended.  Rationale/Recommendations:  Patient will benefit from continued skilled therapy to progress ROM, strength, independence & safety with mobility and train gait. Pt will qualify for home PT due to significant taxing effort to leave home, needing assist of person & AD to leave home. .Recommend TKA exercises 3x/day as HEP.

## 2020-08-27 NOTE — PLAN OF CARE
A&Ox 4. Assist X1 with gait belt and walker to BS comode. VSS on 2L O2 overnight. CMS intact. Dressing C/D/I. Pain controlled with Oxy and Tylenol. Tolerating Reg Diet. Progressing per POC. Will Cont to monitor

## 2020-08-27 NOTE — PLAN OF CARE
At baseline pt lives alone and is independent in self-cares using a walker and IADLs (driving, cleaning, laundry, cooking, etc). Has tub/shower combo with shower chair.    Patient plan: Home with support from daughter then from sister. Desires home therapy  Current status: Supine to sit with Eladio. Assist to don KI. UB dressing independently. Assist with LB dressing. Sit to stand with SBA. Ambulated 20 ft in room with CGA with FWW. Educated regarding vehicle transfer technique, fall prevention strategies, how to progress activity tolerance, pain management, recommended self-care adaptive equipment. Appears to have a very good understanding of education.  Anticipated status at discharge: Min Assist with LB dressing, bathing, bed mobility. SBA with toileting, grooming. Obtain and use extended tub bench and reacher.       Occupational Therapy Discharge Summary    Reason for therapy discharge:    All goals and outcomes met, no further needs identified.    Progress towards therapy goal(s). See goals on Care Plan in [facility] electronic health record for goal details.  Goals met    Therapy recommendation(s):    No further therapy is recommended.

## 2020-08-27 NOTE — PROGRESS NOTES
[facility]  [facility] received a referral from [facility] on 8/20/20 for patient to receive home PT following surgery. Called patient in her hospital room to discuss plans for home care.  Pt to be discharged home today and has agreed to have [facility] provide home PT services. Patient care support center processing referral.  Pt verbalized understanding that initial visit is scheduled for 8/28 or 8/29.  Pt has 24 hour phone number for [facility] for any questions or concerns.

## 2020-08-27 NOTE — PROGRESS NOTES
Care Transitions Note:  Spoke with Valerie from  [facility] regarding the plan for Helga to have PT at home.  Per Valerie the PT referral came directly from care coordinator at [facility] and [facility] has orders for their services.    CHRISTOS Austin [facility]  Inpatient Care Coordinator  M: [phone]

## 2020-08-27 NOTE — PROGRESS NOTES
08/27/20 0923   Quick Adds   Type of Visit Initial Occupational Therapy Evaluation   Living Environment   Lives With alone   Living Arrangements apartment   Living Environment Comment Reports daughter will stay a few nights then sister   Self-Care   Usual Activity Tolerance moderate   Current Activity Tolerance fair   Equipment Currently Used at Home shower chair   Activity/Exercise/Self-Care Comment Tub/shower combo; raised toilet   Functional Level   Ambulation 1-->assistive equipment   Transferring 1-->assistive equipment   Toileting 1-->assistive equipment   Bathing 1-->assistive equipment   Dressing 0-->independent   Eating 0-->independent   Fall history within last six months no   General Information   Onset of Illness/Injury or Date of Surgery - Date 08/26/20   Referring Physician Shola Yu MD   Patient/Family Goals Statement Return home   Additional Occupational Profile Info/Pertinent History of Current Problem Patient with L knee OA who underwent L TKA today; see medical record for further information   Precautions/Limitations fall precautions  (KI)   Cognitive Status Examination   Orientation orientation to person, place and time   Level of Consciousness alert   Follows Commands (Cognition) follows multi-step commands   Memory intact   Executive Function No deficits were identified   Visual Perception   Visual Perception Wears glasses  (for reading)   Sensory Examination   Sensory Comments denies BUE/BLE numbness or tingling   Pain Assessment   Patient Currently in Pain Yes, see Vital Sign flowsheet   Strength   Strength Comments WFL in BUEs; history of L shoulder repllacement   Coordination   Upper Extremity Coordination No deficits were identified   Transfer Skill: Sit to Stand   Level of [place]: Sit/Stand stand-by assist   Transfer Skill: Toilet Transfer   Level of [place]: Toilet stand-by assist   Bathing   Level of [place] minimum assist (75% patients effort)   Lower Body  "Dressing   Level of [place]: Dress Lower Body minimum assist (75% patients effort)   Toileting   Level of [place]: Toilet stand-by assist   Grooming   Level of [place]: Grooming stand-by assist   Instrumental Activities of Daily Living (IADL)   Previous Responsibilities meal prep;housekeeping;laundry;shopping;medication management;finances;driving;work   Activities of Daily Living Analysis   Impairments Contributing to Impaired Activities of Daily Living balance impaired;pain  (decreased activity tolerance)   General Therapy Interventions   Planned Therapy Interventions ADL retraining;transfer training;home program guidelines;progressive activity/exercise   Clinical Impression   Criteria for Skilled Therapeutic Interventions Met yes, treatment indicated   OT Diagnosis decline function   Influenced by the following impairments balance impaired;pain; impaired activity tolerance   Assessment of Occupational Performance 3-5 Performance Deficits   Identified Performance Deficits dressing, bathing, toileting, functional mobility   Clinical Decision Making (Complexity) Low complexity   Therapy Frequency   (one time session)   Anticipated Equipment Needs at Discharge reacher;tub bench   Anticipated Discharge Disposition Home with Assist  (Pt desires home therapy)   Risks and Benefits of Treatment have been explained. Yes   Patient, Family & other staff in agreement with plan of care Yes   [facility] TM \"6 Clicks\"   2016, Trustees of [facility], under license to [employer].  All rights reserved.   6 Clicks Short Forms Daily Activity Inpatient Short Form   [facility]  \"6 Clicks\" Daily Activity Inpatient Short Form   1. Putting on and taking off regular lower body clothing? 3 - A Little   2. Bathing (including washing, rinsing, drying)? 3 - A Little   3. Toileting, which includes using toilet, bedpan or urinal? 3 - A Little   4. Putting on and taking off regular upper body " clothing? 4 - None   5. Taking care of personal grooming such as brushing teeth? 4 - None   6. Eating meals? 4 - None   Daily Activity Raw Score (Score out of 24.Lower scores equate to lower levels of function) 21   Total Evaluation Time   Total Evaluation Time (Minutes) 10

## 2020-08-27 NOTE — PROVIDER NOTIFICATION
Patient vital signs are at baseline: Yes  Patient able to ambulate as they were prior to admission or with assist devices provided by therapies during their stay:  Yes  Patient MUST void prior to discharge:  Yes  Patient able to tolerate oral intake:  Yes  Pain has adequate pain control using Oral analgesics:  Yes     Pt was A & O x 4. Lungs sound clear, bowel sounds active, cms intact except for edema in BLE. Up with 1 and walker. Dressing was changed. received oxycodone, tylenol, and atarax for pain. Discharge instructions and meds reviewed and given. EDU stocking on. Was discharged home.

## 2020-08-28 ENCOUNTER — APPOINTMENT (OUTPATIENT)
Dept: GENERAL RADIOLOGY | Facility: CLINIC | Age: 70
End: 2020-08-28
Attending: EMERGENCY MEDICINE
Payer: COMMERCIAL

## 2020-08-28 ENCOUNTER — HOSPITAL ENCOUNTER (OUTPATIENT)
Facility: CLINIC | Age: 70
Setting detail: OBSERVATION
Discharge: NURSING, REHAB OR LONG-TERM CARE | End: 2020-08-30
Attending: EMERGENCY MEDICINE | Admitting: INTERNAL MEDICINE
Payer: COMMERCIAL

## 2020-08-28 DIAGNOSIS — M17.12 ARTHROPATHY OF LEFT KNEE: ICD-10-CM

## 2020-08-28 DIAGNOSIS — M25.562 ACUTE PAIN OF LEFT KNEE: ICD-10-CM

## 2020-08-28 DIAGNOSIS — E87.6 HYPOKALEMIA: ICD-10-CM

## 2020-08-28 DIAGNOSIS — G89.18 POST-OPERATIVE PAIN: Primary | ICD-10-CM

## 2020-08-28 DIAGNOSIS — Z96.652 HX OF TOTAL KNEE REPLACEMENT, LEFT: ICD-10-CM

## 2020-08-28 PROCEDURE — 25000128 H RX IP 250 OP 636: Performed by: EMERGENCY MEDICINE

## 2020-08-28 PROCEDURE — 25000132 ZZH RX MED GY IP 250 OP 250 PS 637: Performed by: EMERGENCY MEDICINE

## 2020-08-28 PROCEDURE — C9803 HOPD COVID-19 SPEC COLLECT: HCPCS

## 2020-08-28 PROCEDURE — G0378 HOSPITAL OBSERVATION PER HR: HCPCS

## 2020-08-28 PROCEDURE — U0003 INFECTIOUS AGENT DETECTION BY NUCLEIC ACID (DNA OR RNA); SEVERE ACUTE RESPIRATORY SYNDROME CORONAVIRUS 2 (SARS-COV-2) (CORONAVIRUS DISEASE [COVID-19]), AMPLIFIED PROBE TECHNIQUE, MAKING USE OF HIGH THROUGHPUT TECHNOLOGIES AS DESCRIBED BY CMS-2020-01-R: HCPCS | Performed by: EMERGENCY MEDICINE

## 2020-08-28 PROCEDURE — 73560 X-RAY EXAM OF KNEE 1 OR 2: CPT | Mod: LT

## 2020-08-28 PROCEDURE — 99220 ZZC INITIAL OBSERVATION CARE,LEVL III: CPT | Performed by: PHYSICIAN ASSISTANT

## 2020-08-28 PROCEDURE — 96374 THER/PROPH/DIAG INJ IV PUSH: CPT

## 2020-08-28 PROCEDURE — 99285 EMERGENCY DEPT VISIT HI MDM: CPT | Mod: 25

## 2020-08-28 PROCEDURE — 25000132 ZZH RX MED GY IP 250 OP 250 PS 637: Performed by: PHYSICIAN ASSISTANT

## 2020-08-28 PROCEDURE — 25000132 ZZH RX MED GY IP 250 OP 250 PS 637: Performed by: INTERNAL MEDICINE

## 2020-08-28 PROCEDURE — 72170 X-RAY EXAM OF PELVIS: CPT

## 2020-08-28 RX ORDER — ACETAMINOPHEN 325 MG/1
650 TABLET ORAL EVERY 4 HOURS PRN
Status: DISCONTINUED | OUTPATIENT
Start: 2020-08-28 | End: 2020-08-30 | Stop reason: HOSPADM

## 2020-08-28 RX ORDER — LIDOCAINE 40 MG/G
CREAM TOPICAL
Status: DISCONTINUED | OUTPATIENT
Start: 2020-08-28 | End: 2020-08-30 | Stop reason: HOSPADM

## 2020-08-28 RX ORDER — RISPERIDONE 0.5 MG/1
0.5 TABLET ORAL DAILY PRN
Status: DISCONTINUED | OUTPATIENT
Start: 2020-08-28 | End: 2020-08-30 | Stop reason: HOSPADM

## 2020-08-28 RX ORDER — AMOXICILLIN 250 MG
1-2 CAPSULE ORAL 2 TIMES DAILY
Status: DISCONTINUED | OUTPATIENT
Start: 2020-08-28 | End: 2020-08-30 | Stop reason: HOSPADM

## 2020-08-28 RX ORDER — ACETAMINOPHEN 650 MG/1
650 SUPPOSITORY RECTAL EVERY 4 HOURS PRN
Status: DISCONTINUED | OUTPATIENT
Start: 2020-08-28 | End: 2020-08-30 | Stop reason: HOSPADM

## 2020-08-28 RX ORDER — HYDROCHLOROTHIAZIDE 12.5 MG/1
12.5 TABLET ORAL AT BEDTIME
Status: DISCONTINUED | OUTPATIENT
Start: 2020-08-29 | End: 2020-08-30 | Stop reason: HOSPADM

## 2020-08-28 RX ORDER — OXYCODONE HYDROCHLORIDE 5 MG/1
5-10 TABLET ORAL EVERY 4 HOURS PRN
Status: DISCONTINUED | OUTPATIENT
Start: 2020-08-28 | End: 2020-08-30 | Stop reason: HOSPADM

## 2020-08-28 RX ORDER — AMOXICILLIN 250 MG
2 CAPSULE ORAL 2 TIMES DAILY PRN
Status: DISCONTINUED | OUTPATIENT
Start: 2020-08-28 | End: 2020-08-30 | Stop reason: HOSPADM

## 2020-08-28 RX ORDER — HYDROMORPHONE HYDROCHLORIDE 1 MG/ML
0.5 INJECTION, SOLUTION INTRAMUSCULAR; INTRAVENOUS; SUBCUTANEOUS ONCE
Status: COMPLETED | OUTPATIENT
Start: 2020-08-28 | End: 2020-08-28

## 2020-08-28 RX ORDER — ONDANSETRON 2 MG/ML
4 INJECTION INTRAMUSCULAR; INTRAVENOUS EVERY 6 HOURS PRN
Status: DISCONTINUED | OUTPATIENT
Start: 2020-08-28 | End: 2020-08-30 | Stop reason: HOSPADM

## 2020-08-28 RX ORDER — PROCHLORPERAZINE 25 MG
12.5 SUPPOSITORY, RECTAL RECTAL EVERY 12 HOURS PRN
Status: DISCONTINUED | OUTPATIENT
Start: 2020-08-28 | End: 2020-08-30 | Stop reason: HOSPADM

## 2020-08-28 RX ORDER — TRAZODONE HYDROCHLORIDE 50 MG/1
100 TABLET, FILM COATED ORAL AT BEDTIME
Status: DISCONTINUED | OUTPATIENT
Start: 2020-08-28 | End: 2020-08-30 | Stop reason: HOSPADM

## 2020-08-28 RX ORDER — HYDROXYZINE PAMOATE 25 MG/1
25 CAPSULE ORAL 3 TIMES DAILY PRN
Status: DISCONTINUED | OUTPATIENT
Start: 2020-08-28 | End: 2020-08-30 | Stop reason: HOSPADM

## 2020-08-28 RX ORDER — HYDROMORPHONE HYDROCHLORIDE 1 MG/ML
.3-.5 INJECTION, SOLUTION INTRAMUSCULAR; INTRAVENOUS; SUBCUTANEOUS
Status: DISCONTINUED | OUTPATIENT
Start: 2020-08-28 | End: 2020-08-28

## 2020-08-28 RX ORDER — OXYCODONE AND ACETAMINOPHEN 5; 325 MG/1; MG/1
1 TABLET ORAL ONCE
Status: COMPLETED | OUTPATIENT
Start: 2020-08-28 | End: 2020-08-28

## 2020-08-28 RX ORDER — LANOLIN ALCOHOL/MO/W.PET/CERES
3 CREAM (GRAM) TOPICAL
Status: DISCONTINUED | OUTPATIENT
Start: 2020-08-28 | End: 2020-08-30 | Stop reason: HOSPADM

## 2020-08-28 RX ORDER — PROCHLORPERAZINE MALEATE 5 MG
5 TABLET ORAL EVERY 6 HOURS PRN
Status: DISCONTINUED | OUTPATIENT
Start: 2020-08-28 | End: 2020-08-30 | Stop reason: HOSPADM

## 2020-08-28 RX ORDER — AMOXICILLIN 250 MG
1 CAPSULE ORAL 2 TIMES DAILY PRN
Status: DISCONTINUED | OUTPATIENT
Start: 2020-08-28 | End: 2020-08-30 | Stop reason: HOSPADM

## 2020-08-28 RX ORDER — GABAPENTIN 300 MG/1
900 CAPSULE ORAL 2 TIMES DAILY
Status: DISCONTINUED | OUTPATIENT
Start: 2020-08-28 | End: 2020-08-30 | Stop reason: HOSPADM

## 2020-08-28 RX ORDER — ONDANSETRON 4 MG/1
4 TABLET, ORALLY DISINTEGRATING ORAL EVERY 6 HOURS PRN
Status: DISCONTINUED | OUTPATIENT
Start: 2020-08-28 | End: 2020-08-30 | Stop reason: HOSPADM

## 2020-08-28 RX ORDER — RISPERIDONE 1 MG/1
1 TABLET ORAL 2 TIMES DAILY
Status: DISCONTINUED | OUTPATIENT
Start: 2020-08-28 | End: 2020-08-30 | Stop reason: HOSPADM

## 2020-08-28 RX ORDER — FUROSEMIDE 20 MG
20 TABLET ORAL EVERY MORNING
Status: DISCONTINUED | OUTPATIENT
Start: 2020-08-29 | End: 2020-08-30 | Stop reason: HOSPADM

## 2020-08-28 RX ORDER — NALOXONE HYDROCHLORIDE 0.4 MG/ML
.1-.4 INJECTION, SOLUTION INTRAMUSCULAR; INTRAVENOUS; SUBCUTANEOUS
Status: DISCONTINUED | OUTPATIENT
Start: 2020-08-28 | End: 2020-08-30 | Stop reason: HOSPADM

## 2020-08-28 RX ORDER — VARENICLINE TARTRATE 1 MG/1
1 TABLET, FILM COATED ORAL 2 TIMES DAILY
Status: DISCONTINUED | OUTPATIENT
Start: 2020-08-28 | End: 2020-08-30 | Stop reason: HOSPADM

## 2020-08-28 RX ORDER — POLYETHYLENE GLYCOL 3350 17 G/17G
17 POWDER, FOR SOLUTION ORAL DAILY PRN
Status: DISCONTINUED | OUTPATIENT
Start: 2020-08-28 | End: 2020-08-30 | Stop reason: HOSPADM

## 2020-08-28 RX ORDER — KETOROLAC TROMETHAMINE 15 MG/ML
15 INJECTION, SOLUTION INTRAMUSCULAR; INTRAVENOUS EVERY 6 HOURS PRN
Status: DISCONTINUED | OUTPATIENT
Start: 2020-08-28 | End: 2020-08-30 | Stop reason: HOSPADM

## 2020-08-28 RX ORDER — BUDESONIDE AND FORMOTEROL FUMARATE DIHYDRATE 80; 4.5 UG/1; UG/1
2 AEROSOL RESPIRATORY (INHALATION)
Status: DISCONTINUED | OUTPATIENT
Start: 2020-08-28 | End: 2020-08-30 | Stop reason: HOSPADM

## 2020-08-28 RX ORDER — LOSARTAN POTASSIUM 100 MG/1
100 TABLET ORAL AT BEDTIME
Status: DISCONTINUED | OUTPATIENT
Start: 2020-08-29 | End: 2020-08-30 | Stop reason: HOSPADM

## 2020-08-28 RX ORDER — ALBUTEROL SULFATE 90 UG/1
2 AEROSOL, METERED RESPIRATORY (INHALATION) DAILY PRN
Status: DISCONTINUED | OUTPATIENT
Start: 2020-08-28 | End: 2020-08-30 | Stop reason: HOSPADM

## 2020-08-28 RX ADMIN — ASPIRIN 325 MG: 325 TABLET, COATED ORAL at 16:53

## 2020-08-28 RX ADMIN — GABAPENTIN 900 MG: 300 CAPSULE ORAL at 20:46

## 2020-08-28 RX ADMIN — OXYCODONE HYDROCHLORIDE AND ACETAMINOPHEN 1 TABLET: 5; 325 TABLET ORAL at 11:38

## 2020-08-28 RX ADMIN — DOCUSATE SODIUM AND SENNOSIDES 1 TABLET: 8.6; 5 TABLET, FILM COATED ORAL at 20:47

## 2020-08-28 RX ADMIN — FLUOXETINE 60 MG: 20 CAPSULE ORAL at 22:03

## 2020-08-28 RX ADMIN — RISPERIDONE 1 MG: 1 TABLET ORAL at 16:18

## 2020-08-28 RX ADMIN — TRAZODONE HYDROCHLORIDE 100 MG: 50 TABLET ORAL at 22:03

## 2020-08-28 RX ADMIN — OXYCODONE HYDROCHLORIDE 5 MG: 5 TABLET ORAL at 16:18

## 2020-08-28 RX ADMIN — VARENICLINE TARTRATE 1 MG: 1 TABLET, FILM COATED ORAL at 20:47

## 2020-08-28 RX ADMIN — OXYCODONE HYDROCHLORIDE 5 MG: 5 TABLET ORAL at 16:53

## 2020-08-28 RX ADMIN — HYDROMORPHONE HYDROCHLORIDE 0.5 MG: 1 INJECTION, SOLUTION INTRAMUSCULAR; INTRAVENOUS; SUBCUTANEOUS at 13:12

## 2020-08-28 RX ADMIN — OXYCODONE HYDROCHLORIDE 10 MG: 5 TABLET ORAL at 20:46

## 2020-08-28 ASSESSMENT — ENCOUNTER SYMPTOMS
COUGH: 0
FEVER: 0
ABDOMINAL PAIN: 0
SHORTNESS OF BREATH: 0

## 2020-08-28 ASSESSMENT — MIFFLIN-ST. JEOR: SCORE: 1599.76

## 2020-08-28 NOTE — ED NOTES
"[facility]  ED Nurse Handoff Report    ED Chief complaint: Knee Pain      ED Diagnosis:   Final diagnoses:   Arthropathy of left knee   Acute pain of left knee       Code Status: Full Code, confirm with hospitalist    Allergies:   Allergies   Allergen Reactions     Atorvastatin      Lipitor     Colesevelam Nausea and Vomiting     Hmg-Coa-R Inhibitors Other (See Comments)     Aches       Lisinopril Cough     Pravastatin Other (See Comments) and Muscle Pain (Myalgia)     myalgia     Rosuvastatin      crestor       Patient Story: Pt arrives per EMS from home. Pt had left knee replacement on Wed, discharged yesterday. Pt reports being unable to manage in her apt with the stairs and increased pain. Reports pain is \"20 out of 10\" and is not relieved with use of scheduled pain medications.     Focused Assessment:  Pt noted to have sanguinous drainage to old PHOEBE drain site. Pt reports home care visited this am and changed dressing and dressing is saturated currently. Pt also reports left hip pain. Hope is to find placement in TCU. Pt's pain not controlled with dose of percocet so plan is to admit to obs, start IV and administer IV dilaudid for pain control. Pt has not transferred out of bed at this time d/t pain. Purewick placed for urination.     Treatments and/or interventions provided: Medication, xrays  Patient's response to treatments and/or interventions: Tolerated well    To be done/followed up on inpatient unit:  Continue plan of care, ED care coordinator (Helga) started planning for TCU ([facility] has accepted), see notes.     Does this patient have any cognitive concerns?: none noted    Activity level - Baseline/Home:  Stand with Assist, walker  Activity Level - Current:   Unknown    Patient's Preferred language: English   Needed?: No    Isolation: None  Infection: Not Applicable  Bariatric?: No    Vital Signs:   Vitals:    08/28/20 1107 08/28/20 1230 08/28/20 1300   BP: " "(!) 185/93 (!) 144/77 138/76   Pulse: 95 92 91   Resp: 16     Temp: 98.8  F (37.1  C)     TempSrc: Oral     SpO2: 97% 95% 90%   Weight: 109.8 kg (242 lb)     Height: 1.613 m (5' 3.5\")         Cardiac Rhythm:     Was the PSS-3 completed:   Yes  What interventions are required if any?               Family Comments: Daughter at bedside  OBS brochure/video discussed/provided to patient/family: Yes              Name of person given brochure if not patient: n/a              Relationship to patient: n/a    For the majority of the shift this patient's behavior was Green.   Behavioral interventions performed were Frequent rounding.    ED NURSE PHONE NUMBER: *[phone]         "

## 2020-08-28 NOTE — ED NOTES
Bed: ED23  Expected date:   Expected time:   Means of arrival:   Comments:  593  70 F unable to stay at home after knee surgery/needs placement  8250

## 2020-08-28 NOTE — PHARMACY-ADMISSION MEDICATION HISTORY
Pharmacy Medication History  Admission medication history interview status for the 8/28/2020  admission is complete. See EPIC admission navigator for prior to admission medications     Medication history sources: patient, discharge orders from yesterday  Medication history source reliability: Moderate  Adherence assessment: unknown    Significant changes made to the medication list:  Added times for zofran and oxycodone, the only meds she had today.      Additional medication history information:   Patient assured me that no changes have occurred with her meds since she was discharged.     Medication reconciliation completed by provider prior to medication history? No    Time spent in this activity: 20 minutes      Prior to Admission medications    Medication Sig Last Dose Taking? Auth Provider   acetaminophen (TYLENOL) 325 MG tablet Take 2 tablets (650 mg) by mouth every 4 hours as needed for other (mild pain)  Yes Shola Yu MD   albuterol (PROAIR HFA/PROVENTIL HFA/VENTOLIN HFA) 108 (90 Base) MCG/ACT inhaler Inhale 2 puffs into the lungs daily as needed for shortness of breath / dyspnea or wheezing  Yes Reported, Patient   amoxicillin (AMOXIL) 500 MG capsule Take 2,000 mg by mouth daily as needed (1 hour prior to dental work)  Yes Reported, Patient   aspirin (ASA) 325 MG EC tablet Take 1 tablet (325 mg) by mouth 2 times daily (with meals)  at Unknown time Yes Shola Yu MD   budesonide-formoterol (SYMBICORT) 80-4.5 MCG/ACT Inhaler Inhale 2 puffs into the lungs 2 times daily  at Unknown time Yes Reported, Patient   calcium-vitamin D-vitamin K (VIACTIV) 500-500-40 MG-UNT-MCG CHEW Take 1 tablet by mouth At Bedtime   at Unknown time Yes Reported, Patient   FLUoxetine (PROZAC) 20 MG capsule Take 60 mg by mouth At Bedtime (takes 3 x 20mg)    (in addition to AM dose)  at Unknown time Yes Reported, Patient   FLUoxetine (PROZAC) 20 MG capsule Take 20 mg by mouth every morning (in addition to PM dose)  at  Unknown time Yes Reported, Patient   furosemide (LASIX) 20 MG tablet Take 20 mg by mouth every morning  at Unknown time Yes Reported, Patient   gabapentin (NEURONTIN) 300 MG capsule Take 900 mg by mouth 2 times daily (Take 3 X 300 mg = 900 mg dose)  at Unknown time Yes Reported, Patient   hydrOXYzine (VISTARIL) 25 MG capsule Take 1 capsule (25 mg) by mouth 3 times daily as needed for itching or other (pain mgt)  at Unknown time Yes Shola Yu MD   ibuprofen (ADVIL/MOTRIN) 600 MG tablet Take 1 tablet (600 mg) by mouth every 6 hours as needed for pain (mild)  at Unknown time Yes Shola Yu MD   losartan-hydrochlorothiazide (HYZAAR) 100-12.5 MG tablet Take 1 tablet by mouth At Bedtime   at Unknown time Yes Reported, Patient   multivitamin, therapeutic (THERA-VIT) TABS tablet Take 1 tablet by mouth At Bedtime   at Unknown time Yes Reported, Patient   ondansetron (ZOFRAN-ODT) 4 MG ODT tab Take 1-2 tablets (4-8 mg) by mouth every 8 hours as needed for nausea Dissolve ON the tongue. 8/28/2020 at am Yes Shola Yu MD   oxyCODONE (ROXICODONE) 5 MG tablet Take 1-2 tablets (5-10 mg) by mouth every 6 hours as needed for pain (Moderate to Severe) 8/28/2020 at am 5 mg Yes Shola Yu MD   risperiDONE (RISPERDAL) 0.5 MG tablet Take 0.5 mg by mouth daily as needed (MIDDAY if needed) (in addition to morning and afternoon doses)  Yes Reported, Patient   risperiDONE (RISPERDAL) 1 MG tablet Take 1 mg by mouth 2 times daily (morning and afternoon at 4pm)  at Unknown time Yes Reported, Patient   senna-docusate (SENOKOT-S/PERICOLACE) 8.6-50 MG tablet Take 1-2 tablets by mouth 2 times daily Take while on oral narcotics to prevent or treat constipation.  at Unknown time Yes Shola Yu MD   tiotropium (SPIRIVA RESPIMAT) 2.5 MCG/ACT inhaler Inhale 2 puffs into the lungs every morning   at Unknown time Yes Reported, Patient   traMADol (ULTRAM) 50 MG tablet Take 50 mg by mouth 3 times daily as needed   at  Unknown time Yes Reported, Patient   traZODone (DESYREL) 100 MG tablet Take 100 mg by mouth At Bedtime   at Unknown time Yes Reported, Patient   varenicline (CHANTIX) 1 MG tablet Take 1 mg by mouth 2 times daily  Yes Reported, Patient

## 2020-08-28 NOTE — ED TRIAGE NOTES
Pt had knee replacement on Wednesday this week. Home yesterday and is unable to manage at home. Reports 20/10 pain not relieved by medications. Took zofran and oxy prior to transport.

## 2020-08-28 NOTE — PROGRESS NOTES
Ortho    Resting  comfortably   appreciate  Help so  Quickly  From  and soc ser in er   sheis ready to  Go  isuspect she may be  A  Self pay.  Her  Choice  Her pain  Here  Is well  Controlled with po meds    Only  probles  Slowing her  Down was her  Obesity  And lack  Of  Drive  Due  To low  Pain tolerance    Ready  For  discharge  To tcu  If none  Available  Can  Go  Home  There is  No reason  She  Was  Re admitted  To  Hospital.

## 2020-08-28 NOTE — ED NOTES
I was asked to assist this patient with TCU placement from the ER.  This patient is s/p TKA and discharged yesterday to home with HC and daughters assistance. She has returned to the ER for pain control issues.  I called Dr. Yu's clinic and spoke with Wendy and Dr. Yu is ok with this patient going to TCU.  The ER provider agrees with TCU. I spoke with this patient and informed her I will find a TCU and explained that I will need to take the first place that I get.  This patient agreed.  I contacted [facility] and they are Out of Network.  [facility] is full and I called [facility] and this patient is being assessed.  This patient will need pre-auth from insurance also which Ann Marie will start today. I rec'd a call from Ann Marie at [facility] and she said she accepted this patient but has to get Auth from her insurance first.  Ann Marie stated it can take from 3-15 days for the insurance auth.  This patient in the meantime is having a lot of pain and will be admitted for pain control.    I did discuss the acceptance at [facility] in [facility].  I explained the insurance auth taking 3-15 days.  I explained to this patient and her daughter that she definitely won't stay 15 days for insurance auth.  I gave her and her daughter some insight to possible discharge plans including feeling better tomorrow and returning home with HC, getting insurance auth and going to [facility] at [facility] or private paying for TCU at $5,000 up front.  I asked if this patient or her daughter had any questions and they don't. This patients daughter was agreeing to pay the up front money if needed.  I will follow up with Ann Marie at [facility] and let her know what floor this patient will be on so she can f/u with CTS.  I have updated the ER staff.    I was asked to speak with this patients daughter again.  She doesn't want this patient to go to [facility] because her aunt said it has a bad rating.  I explained that if she has a specific place she would like  me to try I will call. The daughter didn't.  I explained that I don't have the capacity to call multiple places for insurance checks and I can't check insurances myself.  I suggested this patients daughter go and visit the [facility] and she declined.  She became teary eyed and wants a different facility. I encouraged the daughter to call this patients insurance for options and she said she doesn't have a phone.  I gave her the ER room phone.  Then she said she didn't have paper and a pen.  I offered to get that for her and she declined. I explained that we try to get facilities that are covered by insurances.  I encouraged this patient to check the medicare website--she snapped at me stating this patient doesn't have medicare. I explained that's where the ratings are. This patient in the mean time agreed to go to [facility], telling her daughter she will be ok to go there. This patients daughter was willing to private pay earlier also. Now she wants a different facility. I will alert the CTS team.      I printed off a list of TCU's to give to this patients daughter and was informed she left and went home. She told the staff she needed to leave to get a cigarette.  I left the TCU lists with this patient to give to her daughter and she agreed and thanked me.

## 2020-08-28 NOTE — ED PROVIDER NOTES
History     Chief Complaint:  Knee Pain    HPI   Helga Geronimo is a 70 year old female 2 days post knee replacement who presents with left knee pain. The patient states that she had surgery on Wednesday here at [facility] performed by Dr. Yu and he said it went well but she has been experiencing increasing pain since her discharge. She states that she can barely get off the bed and go to the bathroom. She states that her hip hurts due to bearing all of the weight. She denies ankle pain. She denies any new falls or injuries. She denies chest pain, shortness of breath, or abdominal pain. She had her PHOEBE drain removed yesterday.     Allergies:  Atorvastatin  Colesevelam  Hmg-Coa-R Inhibitors  Lisinopril  Pravastatin  Rosuvastatin  Simvastatin    Medications:    Vistaril  Oxycodone  Senna-docusate  Albuterol inhaler  Symbicort  Prozac  Lasix  Gabapentin  Hyzaar  Risperdal  Ultram  Desyrel  Chantix    Past Medical History:    Anxiety  Bilateral lower extremity edema  Chronic obstructive pulmonary disease  Depression  Hypertension   Asthma  Anemia  Obesity  Osteoarthritis  Statin myopathy  Tubular adenoma  Leiomyoma, intramural  GI bleed    Past Surgical History:    Left knee arthroplasty  Left shoulder arthroplasty  Bilateral hip replacements  Eye surgery  Gyn surgery  Hysterectomy  Blepharoplasty    Family History:    Brother- colon polyps  Father- cancer  Mother- hyperlipidemia, hypertension, Non Hodgkin Lymphoma    Social History:  Smoking status: Former, quit in July  Alcohol use: Yes  Drug use: Never  PCP: Jennie Morales  Presents to the ED by herself  Marital Status:   [5]    Review of Systems   Constitutional: Negative for fever.   Respiratory: Negative for cough and shortness of breath.    Cardiovascular: Negative for chest pain.   Gastrointestinal: Negative for abdominal pain.   Musculoskeletal:        Knee pain   All other systems reviewed and are negative.      Physical Exam     Patient Vitals for  "the past 24 hrs:   BP Temp Temp src Pulse Resp SpO2 Height Weight   08/28/20 1341 -- -- -- -- -- 93 % -- --   08/28/20 1330 (!) 145/78 -- -- 98 -- -- -- --   08/28/20 1300 138/76 -- -- 91 -- 90 % -- --   08/28/20 1230 (!) 144/77 -- -- 92 -- 95 % -- --   08/28/20 1107 (!) 185/93 98.8  F (37.1  C) Oral 95 16 97 % 1.613 m (5' 3.5\") 109.8 kg (242 lb)        Physical Exam  Physical Exam   General:  Sitting on bed with no one at bedside.   HENT:  No obvious trauma to head  Right Ear:  External ear normal.   Left Ear:  External ear normal.   Nose:  Nose normal.   Eyes:  Conjunctivae and EOM are normal. Pupils are equal, round, and reactive.   Neck: Normal range of motion. Neck supple. No tracheal deviation present.   CV:  Normal heart sounds. No murmur heard.  Pulm/Chest: Effort normal and breath sounds normal.   Abd: Soft. No distension. There is no tenderness. There is no rigidity, no rebound and no guarding.   M/S: Normal range of motion. Left knee with dressing in place. Upper laparoscopic surgical site with very scant blood from it. No erythema or warmth, compartment soft, compression socks in place, DP pulse +2 bilateral.  Neuro: Alert. GCS 15.  Skin: Skin is warm and dry. No rash noted. Not diaphoretic.   Psych: Normal mood and affect. Behavior is normal.     Emergency Department Course     Imaging:  Radiographic findings were communicated with the patient who voiced understanding of the findings.  Pelvis XR, per radiology:   Status post bilateral total hip arthroplasties. No   hardware complication. No acute fracture or malalignment. Mild   bilateral sacroiliac joint degenerative changes. Severe degenerative   changes of the lower lumbar spine. Osteopenia.     Left Knee XR, per radiology:   Status post left total knee arthroplasty. No hardware   complication. Interval resolution of subcutaneous emphysema. Residual   knee joint effusion, anterior knee soft tissue swelling, and skin   staples. No acute fracture or " malalignment.     Laboratory:   Asymptomatic COVID-19 Virus (Coronavirus) by PCR: Pending     Interventions:  1138: Percocet 5/325 1 tablet PO  1312: Dilaudid 0.5 mg IV    Emergency Department Course:  Past medical records, nursing notes, and vitals reviewed.  1120: I performed an exam of the patient and obtained history, as documented above.     1124: I spoke with Nilam Llamas PA-C of Orthopedics.     1127: I spoke with  Helga.    1315: I spoke with  Helga again.     Findings and plan explained to the Patient who consents to admission.     1333: Discussed the patient with Dr. Blanca, who will admit the patient to an observation bed for further monitoring, evaluation, and treatment.      1334: The patient's nose and throat were swabbed and this sample was sent for COVID testing, findings above.      Updated patient and daughter.    Impression & Plan    Medical Decision Making:  Helga Geronimo is a very pleasant 70 year old year old patient who presents to the emergency department with concern of left knee pain.  She had a left knee total arthroplasty by Dr. Yu on Wednesday.  She desired at that time to be discharged to home for therapy.  She has been unable to tolerate the pain at home.  There has been no new fall or injury.  The patient reports pain to the left knee and slight pain to the left hip.  She denies any fever at home.  She has not hypoxic here.  The patient's PHOEBE drain was pulled yesterday.  There is very scant bleeding from this site.  There is no significant erythema or warmth to suggest infection.  She has no focal calf pain or swelling to suggest DVT.  The patient is taking aspirin.  The patient was provided a Percocet here but had too much pain to even move.  We attempted to place the patient but they could not accept the patient given her need for IV narcotics.  The patient will be admitted to the hospital overnight for observation for pain control with IV medicines prior  to admission to TCU for therapy.  This was the patient's desire and she consented for this.  I spoke to the hospitalist, Dr. Blanca, who has agreed to admit the patient for continued evaluation and treatment.    Covid-19  Helga Geronimo was evaluated during a global COVID-19 pandemic, which necessitated consideration that the patient might be at risk for infection with the SARS-CoV-2 virus that causes COVID-19.   Applicable protocols for evaluation were followed during the patient's care.   COVID-19 was considered as part of the patient's evaluation. The plan for testing is:  a test was obtained during this visit.    Diagnosis:    ICD-10-CM    1. Arthropathy of left knee  M17.12    2. Acute pain of left knee  M25.562        Disposition:  Admitted to [facility]    Maliha Patel  8/28/2020    EMERGENCY DEPARTMENT    Maliha CARVAJAL, aly serving as a scribe at 11:41 AM on 8/28/2020 to document services personally performed by Krish Frazier DO based on my observations and the provider's statements to me.         Krish Frazier DO  08/28/20 3532

## 2020-08-29 ENCOUNTER — APPOINTMENT (OUTPATIENT)
Dept: PHYSICAL THERAPY | Facility: CLINIC | Age: 70
End: 2020-08-29
Attending: PHYSICIAN ASSISTANT
Payer: COMMERCIAL

## 2020-08-29 LAB
ANION GAP SERPL CALCULATED.3IONS-SCNC: 3 MMOL/L (ref 3–14)
BUN SERPL-MCNC: 14 MG/DL (ref 7–30)
CALCIUM SERPL-MCNC: 8.1 MG/DL (ref 8.5–10.1)
CHLORIDE SERPL-SCNC: 103 MMOL/L (ref 94–109)
CO2 SERPL-SCNC: 30 MMOL/L (ref 20–32)
CREAT SERPL-MCNC: 0.86 MG/DL (ref 0.52–1.04)
ERYTHROCYTE [DISTWIDTH] IN BLOOD BY AUTOMATED COUNT: 14.3 % (ref 10–15)
GFR SERPL CREATININE-BSD FRML MDRD: 69 ML/MIN/{1.73_M2}
GLUCOSE SERPL-MCNC: 103 MG/DL (ref 70–99)
HCT VFR BLD AUTO: 33.7 % (ref 35–47)
HGB BLD-MCNC: 11.3 G/DL (ref 11.7–15.7)
MCH RBC QN AUTO: 32.7 PG (ref 26.5–33)
MCHC RBC AUTO-ENTMCNC: 33.5 G/DL (ref 31.5–36.5)
MCV RBC AUTO: 97 FL (ref 78–100)
PLATELET # BLD AUTO: 214 10E9/L (ref 150–450)
POTASSIUM SERPL-SCNC: 3.3 MMOL/L (ref 3.4–5.3)
RBC # BLD AUTO: 3.46 10E12/L (ref 3.8–5.2)
SARS-COV-2 RNA SPEC QL NAA+PROBE: NOT DETECTED
SODIUM SERPL-SCNC: 136 MMOL/L (ref 133–144)
SPECIMEN SOURCE: NORMAL
WBC # BLD AUTO: 8.4 10E9/L (ref 4–11)

## 2020-08-29 PROCEDURE — 97161 PT EVAL LOW COMPLEX 20 MIN: CPT | Mod: GP

## 2020-08-29 PROCEDURE — 25000132 ZZH RX MED GY IP 250 OP 250 PS 637: Performed by: PHYSICIAN ASSISTANT

## 2020-08-29 PROCEDURE — 99225 ZZC SUBSEQUENT OBSERVATION CARE,LEVEL II: CPT | Performed by: PHYSICIAN ASSISTANT

## 2020-08-29 PROCEDURE — G0378 HOSPITAL OBSERVATION PER HR: HCPCS

## 2020-08-29 PROCEDURE — 25000132 ZZH RX MED GY IP 250 OP 250 PS 637: Performed by: INTERNAL MEDICINE

## 2020-08-29 PROCEDURE — 97110 THERAPEUTIC EXERCISES: CPT | Mod: GP

## 2020-08-29 PROCEDURE — 36415 COLL VENOUS BLD VENIPUNCTURE: CPT | Performed by: PHYSICIAN ASSISTANT

## 2020-08-29 PROCEDURE — 25000132 ZZH RX MED GY IP 250 OP 250 PS 637: Performed by: ORTHOPAEDIC SURGERY

## 2020-08-29 PROCEDURE — 80048 BASIC METABOLIC PNL TOTAL CA: CPT | Performed by: PHYSICIAN ASSISTANT

## 2020-08-29 PROCEDURE — 85027 COMPLETE CBC AUTOMATED: CPT | Performed by: PHYSICIAN ASSISTANT

## 2020-08-29 RX ORDER — POTASSIUM CHLORIDE 1500 MG/1
20 TABLET, EXTENDED RELEASE ORAL DAILY
Refills: 0 | Status: ON HOLD | DISCHARGE
Start: 2020-08-30 | End: 2024-07-30

## 2020-08-29 RX ORDER — CALCIUM CARBONATE 500 MG/1
1000 TABLET, CHEWABLE ORAL EVERY 4 HOURS PRN
Status: DISCONTINUED | OUTPATIENT
Start: 2020-08-29 | End: 2020-08-30 | Stop reason: HOSPADM

## 2020-08-29 RX ORDER — POTASSIUM CHLORIDE 1500 MG/1
20 TABLET, EXTENDED RELEASE ORAL DAILY
Status: DISCONTINUED | OUTPATIENT
Start: 2020-08-29 | End: 2020-08-30 | Stop reason: HOSPADM

## 2020-08-29 RX ADMIN — BUDESONIDE AND FORMOTEROL FUMARATE DIHYDRATE 2 PUFF: 80; 4.5 AEROSOL RESPIRATORY (INHALATION) at 16:54

## 2020-08-29 RX ADMIN — ACETAMINOPHEN 650 MG: 325 TABLET, FILM COATED ORAL at 21:05

## 2020-08-29 RX ADMIN — OXYCODONE HYDROCHLORIDE 10 MG: 5 TABLET ORAL at 09:01

## 2020-08-29 RX ADMIN — CALCIUM CARBONATE (ANTACID) CHEW TAB 500 MG 1000 MG: 500 CHEW TAB at 11:01

## 2020-08-29 RX ADMIN — OXYCODONE HYDROCHLORIDE 10 MG: 5 TABLET ORAL at 12:58

## 2020-08-29 RX ADMIN — ACETAMINOPHEN 650 MG: 325 TABLET, FILM COATED ORAL at 16:52

## 2020-08-29 RX ADMIN — FUROSEMIDE 20 MG: 20 TABLET ORAL at 09:02

## 2020-08-29 RX ADMIN — FLUOXETINE 20 MG: 20 CAPSULE ORAL at 09:01

## 2020-08-29 RX ADMIN — GABAPENTIN 900 MG: 300 CAPSULE ORAL at 21:06

## 2020-08-29 RX ADMIN — POTASSIUM CHLORIDE 20 MEQ: 1500 TABLET, EXTENDED RELEASE ORAL at 09:02

## 2020-08-29 RX ADMIN — HYDROCHLOROTHIAZIDE 12.5 MG: 12.5 TABLET ORAL at 21:04

## 2020-08-29 RX ADMIN — FLUOXETINE 60 MG: 20 CAPSULE ORAL at 21:06

## 2020-08-29 RX ADMIN — RISPERIDONE 1 MG: 1 TABLET ORAL at 09:02

## 2020-08-29 RX ADMIN — OXYCODONE HYDROCHLORIDE 10 MG: 5 TABLET ORAL at 16:52

## 2020-08-29 RX ADMIN — ASPIRIN 325 MG: 325 TABLET, COATED ORAL at 16:54

## 2020-08-29 RX ADMIN — DOCUSATE SODIUM AND SENNOSIDES 1 TABLET: 8.6; 5 TABLET, FILM COATED ORAL at 21:05

## 2020-08-29 RX ADMIN — RISPERIDONE 1 MG: 1 TABLET ORAL at 16:52

## 2020-08-29 RX ADMIN — ACETAMINOPHEN 650 MG: 325 TABLET, FILM COATED ORAL at 09:00

## 2020-08-29 RX ADMIN — VARENICLINE TARTRATE 1 MG: 1 TABLET, FILM COATED ORAL at 09:02

## 2020-08-29 RX ADMIN — BUDESONIDE AND FORMOTEROL FUMARATE DIHYDRATE 2 PUFF: 80; 4.5 AEROSOL RESPIRATORY (INHALATION) at 06:29

## 2020-08-29 RX ADMIN — ACETAMINOPHEN 650 MG: 325 TABLET, FILM COATED ORAL at 12:58

## 2020-08-29 RX ADMIN — OXYCODONE HYDROCHLORIDE 10 MG: 5 TABLET ORAL at 04:55

## 2020-08-29 RX ADMIN — VARENICLINE TARTRATE 1 MG: 1 TABLET, FILM COATED ORAL at 21:05

## 2020-08-29 RX ADMIN — ALBUTEROL SULFATE 2 PUFF: 90 INHALANT RESPIRATORY (INHALATION) at 06:29

## 2020-08-29 RX ADMIN — TRAZODONE HYDROCHLORIDE 100 MG: 50 TABLET ORAL at 21:05

## 2020-08-29 RX ADMIN — OXYCODONE HYDROCHLORIDE 10 MG: 5 TABLET ORAL at 00:44

## 2020-08-29 RX ADMIN — LOSARTAN POTASSIUM 100 MG: 100 TABLET, FILM COATED ORAL at 21:05

## 2020-08-29 RX ADMIN — DOCUSATE SODIUM AND SENNOSIDES 1 TABLET: 8.6; 5 TABLET, FILM COATED ORAL at 09:01

## 2020-08-29 RX ADMIN — ASPIRIN 325 MG: 325 TABLET, COATED ORAL at 09:02

## 2020-08-29 RX ADMIN — OXYCODONE HYDROCHLORIDE 10 MG: 5 TABLET ORAL at 21:06

## 2020-08-29 RX ADMIN — GABAPENTIN 900 MG: 300 CAPSULE ORAL at 09:01

## 2020-08-29 NOTE — PROGRESS NOTES
Pt A&O, VSS on RA. CMS intact. Voiding in BR, Ax1 w/walker. Reg diet. Bleeding from pulled drain site, covered with gauze. Surgical dressing CDI. Pain managed with oxy.

## 2020-08-29 NOTE — PROGRESS NOTES
Helga Geronimo  2020    Doing well.  Pain well-controlled.  Temperatures:  Current - Temp: 98.8  F (37.1  C); Max - Temp  Av.9  F (37.2  C)  Min: 98.7  F (37.1  C)  Max: 99.3  F (37.4  C)  Pulse range: Pulse  Av.6  Min: 91  Max: 99  Blood pressure range: Systolic (24hrs), Av , Min:122 , Max:185   ; Diastolic (24hrs), Av, Min:67, Max:93    CMS: intact  Labs:   Results for orders placed or performed during the hospital encounter of 20 (from the past 24 hour(s))   XR Knee Left 1/2 Views    Narrative    KNEE ONE-TWO VIEWS LEFT  2020 12:08 PM     HISTORY: pod 2, pain    COMPARISON: 2020      Impression    IMPRESSION: Status post left total knee arthroplasty. No hardware  complication. Interval resolution of subcutaneous emphysema. Residual  knee joint effusion, anterior knee soft tissue swelling, and skin  staples. No acute fracture or malalignment.    ENMA GOLDBERG MD   XR Pelvis 1/2 Views    Narrative    XR PELVIS 1/2 VW  2020 12:09 PM     HISTORY: left hip pain    COMPARISON: 12/10/2009      Impression    IMPRESSION: Status post bilateral total hip arthroplasties. No  hardware complication. No acute fracture or malalignment. Mild  bilateral sacroiliac joint degenerative changes. Severe degenerative  changes of the lower lumbar spine. Osteopenia.     ENMA GOLDBERG MD       PLAN:  Discharge plan: today    I am not  Able  To  Re write  Any   Prescriptions ( Epic  Wont let me )  So hope  Admitting  Team  Can or pts  Family can   The  meds she  Went  Home  And  Carry  To   Nursing  Facility.

## 2020-08-29 NOTE — PROGRESS NOTES
[facility]    Hospitalist Progress Note    Date of Service (when I saw the patient): 08/29/2020    Assessment & Plan   Helga Geronimo is a pleasant 70-year-old female with a past medical history of hypertension, asthma, chronic obstructive pulmonary disease, depression, obesity and lower extremity edema who recently underwent left total knee arthroplasty on 08/26/2020.  She had been discharged from the hospital to home with assistance of family and home care, but pain became unbearable, thus, she re-presented to the Emergency Department for Transitional Care Unit placement.  She is registered under observation.      1.  Status post left total knee arthroplasty, 08/26/2020.  Procedure was performed without immediate complications.  The patient was ultimately discharged to home yesterday, 08/27, but had increased pain.  Home care nurse evaluated the patient and recommended coming to the Emergency Room.  The patient's daughter is involved with her care.  Care coordinator consulted from the Emergency Room and offered a Transitional Care Unit option; however, the patient's daughter was not agreeable for the patient going to that particular facility.  Please see care coordinator notes for additional details.    ----Ultimately, the patient is registered to observation for her pain control and for safe disposition/placement planning.    --Pain control: Oxycodone ordered 5-10 mg q.4 hours as needed, intravenous Dilaudid to be reserved for severe pain not controlled with oral medication.  Tylenol available.  Ice and heat as indicated.  Elevate leg.    --Physical Therapy eval completed, recommend TCU.    --Social work consulted, discussed with SW today and they are working on placement.  --Discussed plans with daughter at bedside.     2.  Anemia. Hemoglobin 08/27 is 11.6.  Stable today at 11.3.     3.  Hypertension.  We will monitor blood pressure and order medications if indicated.    --Continue prior to  admission Losartan/hydrochlorothiazide 100/12.5 mg at bedtime with hold parameter.      4.  Anxiety.  Resume prior to admission psychiatric medications, Risperdal, trazodone, Vistaril and Prozac.     5.  History of tobacco use.  Resume prior to admission Chantix.      6.  Bilateral lower extremity edema, chronic.  Resume prior to admission Lasix.      7.  Chronic obstructive pulmonary disease/asthma.  Lungs are clear on exam.  Breathing comfortably.  Resume prior to admission Symbicort twice a day.  Albuterol as needed.     #.  Mild hypokalemia.  This is being replaced.  Repeat potassium in the a.m.     DVT prophylaxis.  Aspirin 325 mg twice daily per Orthopedics.      CODE STATUS:  FULL CODE.       DISPOSITION:  Anticipate the patient will be able to discharge to transitional care unit once a facility is obtained, probably tomorrow.  Orthopedic surgery has cleared the patient for discharge.  Discussed with daughter at bedside who is in agreement.    Topher Dagoberto Cárdenas    Interval History   Patient doing better than when she came in.  Lying comfortably in bed on my arrival.  Daughter present at bedside.  Denies fever, chills, chest pain, shortness of breath, abdominal pain, nausea, vomiting, diarrhea.  Left knee pain is controlled on oral medications.  Awaiting TCU placement.    -Data reviewed today: I reviewed all new labs and imaging results over the last 24 hours. I personally reviewed no images or EKG's today.    Physical Exam   Temp: 98.8  F (37.1  C) Temp src: Oral BP: 137/76 Pulse: 97   Resp: 16 SpO2: 91 % O2 Device: None (Room air) Oxygen Delivery: 2 LPM  Vitals:    08/28/20 1107   Weight: 109.8 kg (242 lb)     Vital Signs with Ranges  Temp:  [98.7  F (37.1  C)-99.3  F (37.4  C)] 98.8  F (37.1  C)  Pulse:  [91-99] 97  Resp:  [16] 16  BP: (122-185)/(67-93) 137/76  SpO2:  [88 %-97 %] 91 %  I/O last 3 completed shifts:  In: 250 [P.O.:250]  Out: -     Constitutional: Alert, oriented to self, place, date,  situation.  Cooperative, pleasant, lying in bed no apparent distress.  Respiratory:  Lungs CTAB.  No crackles, wheezes, or rhonchi, no labored breathing.  Cardiovascular:  Heart RRR, no MRG, bilateral lower extremity edema 1-2+, slightly greater on left side.  EDU stockings in place.  GI:  Abdomen soft, NT/ND and with normoactive BS  Skin/Integumen:  Warm, dry, non-diaphoretic.  No rashes on exposed skin.  MSK: CMS x4 grossly intact.    Medications       aspirin  325 mg Oral BID w/meals     budesonide-formoterol  2 puff Inhalation 2 times daily     FLUoxetine  20 mg Oral Daily    And     FLUoxetine  60 mg Oral At Bedtime     furosemide  20 mg Oral QAM     gabapentin  900 mg Oral BID     losartan  100 mg Oral At Bedtime    And     hydrochlorothiazide  12.5 mg Oral At Bedtime     potassium chloride  20 mEq Oral Daily     risperiDONE  1 mg Oral BID     senna-docusate  1-2 tablet Oral BID     sodium chloride (PF)  3 mL Intracatheter Q8H     tiotropium  2 puff Inhalation QAM     traZODone  100 mg Oral At Bedtime     varenicline  1 mg Oral BID       Data   Recent Labs   Lab 08/29/20  0640 08/27/20  0647 08/26/20  0843   WBC 8.4  --   --    HGB 11.3* 11.6*  --    MCV 97  --   --      --   --     140  --    POTASSIUM 3.3* 4.1 3.9   CHLORIDE 103 107  --    CO2 30 31  --    BUN 14 13  --    CR 0.86 0.92  --    ANIONGAP 3 2*  --    BRAYAN 8.1* 8.0*  --    * 124*  --        Recent Results (from the past 24 hour(s))   XR Knee Left 1/2 Views    Narrative    KNEE ONE-TWO VIEWS LEFT  8/28/2020 12:08 PM     HISTORY: pod 2, pain    COMPARISON: 8/26/2020      Impression    IMPRESSION: Status post left total knee arthroplasty. No hardware  complication. Interval resolution of subcutaneous emphysema. Residual  knee joint effusion, anterior knee soft tissue swelling, and skin  staples. No acute fracture or malalignment.    ENMA GOLDBERG MD   XR Pelvis 1/2 Views    Narrative    XR PELVIS 1/2 VW  8/28/2020 12:09 PM      HISTORY: left hip pain    COMPARISON: 12/10/2009      Impression    IMPRESSION: Status post bilateral total hip arthroplasties. No  hardware complication. No acute fracture or malalignment. Mild  bilateral sacroiliac joint degenerative changes. Severe degenerative  changes of the lower lumbar spine. Osteopenia.     ENMA GOLDBERG MD

## 2020-08-29 NOTE — H&P
Admitted:     08/28/2020      PRIMARY CARE PHYSICIAN:  Jennie Morales PA-C      CHIEF COMPLAINT:  Knee pain, status post TKA.      HISTORY OF PRESENT ILLNESS:  Helga Geronimo is a pleasant 70-year-old female with a past medical history of COPD, chronic back pain, anxiety, arthritis, intermittent asthma, iron deficiency anemia, obesity and tobacco use who presents to the Emergency Department with complaints of knee pain.  The patient most recently had a left total knee arthroplasty for treatment of arthritis this last Wednesday on 08/26/2020 with Dr. Yu.  The procedure was without any immediate complications, but she has been experiencing increasing pain since surgery.  She was discharged to home with the assistance of her daughter and home care.  She reports discharging home yesterday, but states that the pain was so severe.  She is unable to go up or down any stairs.  Her home care nurse saw her today and recommended she go to the ER.  Notably, she had her PHOEBE drain removed yesterday.  She denies any fevers, chills, headache, lightheadedness, chest pain, shortness of breath, abdominal pain, nausea, vomiting, diarrhea, dysuria, focal weakness, numbness or tingling.  She has been using Percocet at home for the pain, which she states has not helped.      In the Emergency Department, the patient was evaluated by Dr. Frazier.  There, she was found to have a blood pressure 185/93 initially with pulse of 95 and temperature 98.8.  She had x-rays done of the pelvis and knee.  Pelvis x-ray shows bilateral total hip arthroplasties, but no hardware complication, acute fracture or malalignment.  Knee x-ray shows no hardware complication or acute changes.  The COVID-19 swab is pending, previously negative.  The patient was given Percocet and IV Dilaudid in the Emergency Department with some relief of her pain.  Care coordinator was consulted in the ER to help assist with TCU placement.  The patient and her daughter, who is  present, are in agreement with TCU placement; however, when a suitable option was provided, the patient's daughter declined placement at that particular facility, stating it was not adequate.  The patient is in too much pain to discharge straight back to home, so therefore was being admitted under observation until safe disposition and pain management can be achieved.      PAST MEDICAL HISTORY:   1.  Anxiety.   2.  Bilateral lower extremity edema.   3.  Chronic obstructive pulmonary disease.   4.  Depression.   5.  Hypertension.   6.  Asthma.   7.  Anemia.   8.  Obesity.   9.  Osteoarthritis.   10.  Statin myopathy.   11.  Tubular adenoma.   12.  Leiomyoma, intramural.   13.  History of GI bleed.   14.  Chronic pain.      PAST SURGICAL HISTORY:    Past Surgical History:   Procedure Laterality Date     ARTHROPLASTY KNEE Left 8/26/2020    Procedure: LEFT TOTAL KNEE ARTHROPLASTY;  Surgeon: Shola Yu MD;  Location:  OR     ARTHROPLASTY SHOULDER Left 11/20/2019    Procedure: LEFT TOTAL SHOULDER ARTHROPLASTY;  Surgeon: Shola Yu MD;  Location:  OR     bilat hip replacements        EYE SURGERY       GYN SURGERY       HYSTERECTOMY       JOINT REPLACEMENT       FAMILY HISTORY:  Father with cancer.  Mother with hyperlipidemia, hypertension and non-Hodgkin lymphoma.      SOCIAL HISTORY:  The patient is a former smoker.  She drinks alcohol seldomly.  She is .  Her daughter accompanies her to the ER.  Daughter not available on my arrival.  No illicit drug use.      PRIOR TO ADMISSION MEDICATIONS:    Medications Prior to Admission   Medication Sig Dispense Refill Last Dose     acetaminophen (TYLENOL) 325 MG tablet Take 2 tablets (650 mg) by mouth every 4 hours as needed for other (mild pain) 100 tablet 0      albuterol (PROAIR HFA/PROVENTIL HFA/VENTOLIN HFA) 108 (90 Base) MCG/ACT inhaler Inhale 2 puffs into the lungs daily as needed for shortness of breath / dyspnea or wheezing        amoxicillin  (AMOXIL) 500 MG capsule Take 2,000 mg by mouth daily as needed (1 hour prior to dental work)        aspirin (ASA) 325 MG EC tablet Take 1 tablet (325 mg) by mouth 2 times daily (with meals) 42 tablet 0  at Unknown time     budesonide-formoterol (SYMBICORT) 80-4.5 MCG/ACT Inhaler Inhale 2 puffs into the lungs 2 times daily    at Unknown time     calcium-vitamin D-vitamin K (VIACTIV) 500-500-40 MG-UNT-MCG CHEW Take 1 tablet by mouth At Bedtime     at Unknown time     FLUoxetine (PROZAC) 20 MG capsule Take 60 mg by mouth At Bedtime (takes 3 x 20mg)    (in addition to AM dose)    at Unknown time     FLUoxetine (PROZAC) 20 MG capsule Take 20 mg by mouth every morning (in addition to PM dose)    at Unknown time     furosemide (LASIX) 20 MG tablet Take 20 mg by mouth every morning    at Unknown time     gabapentin (NEURONTIN) 300 MG capsule Take 900 mg by mouth 2 times daily (Take 3 X 300 mg = 900 mg dose)    at Unknown time     hydrOXYzine (VISTARIL) 25 MG capsule Take 1 capsule (25 mg) by mouth 3 times daily as needed for itching or other (pain mgt) 40 capsule 0  at Unknown time     ibuprofen (ADVIL/MOTRIN) 600 MG tablet Take 1 tablet (600 mg) by mouth every 6 hours as needed for pain (mild) 30 tablet 0  at Unknown time     losartan-hydrochlorothiazide (HYZAAR) 100-12.5 MG tablet Take 1 tablet by mouth At Bedtime     at Unknown time     multivitamin, therapeutic (THERA-VIT) TABS tablet Take 1 tablet by mouth At Bedtime     at Unknown time     ondansetron (ZOFRAN-ODT) 4 MG ODT tab Take 1-2 tablets (4-8 mg) by mouth every 8 hours as needed for nausea Dissolve ON the tongue. 4 tablet 0 8/28/2020 at am     oxyCODONE (ROXICODONE) 5 MG tablet Take 1-2 tablets (5-10 mg) by mouth every 6 hours as needed for pain (Moderate to Severe) 60 tablet 0 8/28/2020 at am 5 mg     risperiDONE (RISPERDAL) 0.5 MG tablet Take 0.5 mg by mouth daily as needed (MIDDAY if needed) (in addition to morning and afternoon doses)        risperiDONE  (RISPERDAL) 1 MG tablet Take 1 mg by mouth 2 times daily (morning and afternoon at 4pm)    at Unknown time     senna-docusate (SENOKOT-S/PERICOLACE) 8.6-50 MG tablet Take 1-2 tablets by mouth 2 times daily Take while on oral narcotics to prevent or treat constipation. 30 tablet 0  at Unknown time     tiotropium (SPIRIVA RESPIMAT) 2.5 MCG/ACT inhaler Inhale 2 puffs into the lungs every morning     at Unknown time     traZODone (DESYREL) 100 MG tablet Take 100 mg by mouth At Bedtime     at Unknown time     varenicline (CHANTIX) 1 MG tablet Take 1 mg by mouth 2 times daily        [DISCONTINUED] traMADol (ULTRAM) 50 MG tablet Take 50 mg by mouth 3 times daily as needed     at Unknown time       ALLERGIES:  ATORVASTATIN, COLESEVELAM, STATIN, LISINOPRIL, PRAVASTATIN, ROSUVASTATIN.      REVIEW OF SYSTEMS:  A complete 10-point review of systems was performed and is negative other than the items mentioned in above HPI.      PHYSICAL EXAMINATION:   VITAL SIGNS:  Blood pressure 133/75, heart rate 89 beats per minute, temperature 98.7, respiratory rate 16, oxygen saturation 96% on 2 liters of oxygen.   GENERAL:  The patient is alert, oriented to self, place, date and situation.  Lying on the gurney, uncomfortable appearing, but not in distress.   EYES:  Pupils equal and round.  Extraocular movements intact.   HEENT:  Head normocephalic.  Throat, mucosa and tongue appear moist.   NECK:  Supple.   CARDIOVASCULAR:  Heart regular rate and rhythm, no murmurs, rubs or gallops.  Distal pulses are intact.   PULMONARY:  Lungs are clear to auscultation bilaterally, no crackles, wheezes or rhonchi.  Breathing is nonlabored.   GASTROINTESTINAL:  Abdomen soft, nontender, nondistended with normoactive bowel sounds.   MUSCULOSKELETAL:  Left knee with dressing in place, upper laparoscopic surgical site with very scant blood draining.  Very subtle erythema and warmth adjacent to the knee dressing, normal-appearing in the postoperative phase,  noncellulitic.  Compression stockings in place.  Pulses intact.   SKIN:  Warm, dry, no rashes noted.   NEUROLOGIC:  Alert, cranial nerves II-XII are grossly intact.  Motor function and sensation are normal and equal in all 4 extremities.  No focal deficits.   PSYCHIATRIC:  Normal mood and affect.      LABORATORY DATA:  Asymptomatic.  COVID-19 virus swab is pending.  Previous labs from her recent admission were reviewed.      IMAGING:  Per Radiology, x-ray of the pelvis shows status post bilateral total hip arthroplasty.  No hardware complication.  No acute fracture or malalignment.  Mild bilateral sacroiliac joint degenerative changes.  Severe degenerative changes of the lumbar spine.  Osteopenia.  Left knee x-ray, per Radiology, shows status post left total knee arthroplasty.  No hardware complication.  Interval resolution of subcutaneous emphysema.  Residual knee joint effusion, anterior knee soft tissue swelling and skin staples.  No acute fracture or malalignment.      ASSESSMENT AND PLAN:  Helga Geronimo is a pleasant 70-year-old female with a past medical history of hypertension, asthma, chronic obstructive pulmonary disease, depression, obesity and lower extremity edema who recently underwent left total knee arthroplasty on 08/26/2020.  She had been discharged from the hospital to home with assistance of family and home care, but pain became unbearable, thus, she re-presented to the Emergency Department for Transitional Care Unit placement.  She is registered under observation.     1.  Status post left total knee arthroplasty, 08/26/2020.  Procedure was performed without immediate complications.  The patient was ultimately discharged to home yesterday, 08/27, but had increased pain.  Home care nurse evaluated the patient and recommended coming to the Emergency Room.  The patient's daughter is involved with her care.  Care coordinator consulted from the Emergency Room and offered a Transitional Care Unit option;  however, the patient's daughter was not agreeable for the patient going to that particular facility.  Please see care coordinator notes for additional details.  Ultimately, the patient is registered to observation for her pain control and for safe disposition/placement planning.  Oxycodone ordered 5-10 mg q.4 hours as needed, intravenous Dilaudid to be reserved for severe pain not controlled with oral medication.  Tylenol available.  Ice and heat as indicated.  Elevate leg.  Physical Therapy evaluation ordered.  Social work consulted.  We will monitor her skin for any changes.     2.  Anemia.  We will repeat hemoglobin in a.m.  Hemoglobin 08/27 is 11.6.     3.  Hypertension.  We will monitor blood pressure and order medications if indicated.  Resume prior to admission Losartan/hydrochlorothiazide 100/12.5 mg at bedtime with hold parameter.     4.  Anxiety.  Resume prior to admission psychiatric medications, Risperdal, trazodone and Vistaril.  Resume Prozac.     5.  History of tobacco use.  Resume prior to admission Chantix.     6.  Bilateral lower extremity edema, chronic.  Resume prior to admission Lasix.     7.  Chronic obstructive pulmonary disease/asthma.  Lungs are clear on exam.  Breathing comfortably.  Resume prior to admission Symbicort twice a day.     8.  Albuterol as needed.     9.  Deep venous thrombosis prophylaxis.  Aspirin 325 mg twice daily per Orthopedics.      CODE STATUS:  FULL CODE.       DISPOSITION:  Anticipate the patient will be able to discharge to transitional care unit once a facility is obtained that is to the preference of the patient and family.  Orthopedic Surgery will be consulted for clearance, but ultimately likely can discharge within the next 24 hours.  Social work consulted.      The patient was discussed with Dr. Patric Blanca, [facility].  She is in agreement with my assessment and plan of care.         PATRIC BLANCA MD       As dictated by ENMA LOPEZ  DEVON ROSAS            D: 2020   T: 2020   MT: JEN      Name:     LOREE BECK   MRN:      [id number]        Account:      [id number]   :      1950        Admitted:     2020                   Document: [id number]       cc: Jennie Morales PA-C

## 2020-08-29 NOTE — PLAN OF CARE
Patient vital signs are at baseline: Yes  Patient able to ambulate as they were prior to admission or with assist devices provided by therapies during their stay:  Yes, up w/ assist of 1, walker and gait belt  Patient MUST void prior to discharge:  Yes, voiding in the bathroom  Patient able to tolerate oral intake:  Yes  Pain has adequate pain control using Oral analgesics:  Yes, pain controlled w/ Oxycodone and Tylenol

## 2020-08-29 NOTE — PROGRESS NOTES
08/29/20 1002   Quick Adds   Type of Visit Initial PT Evaluation   Living Environment   Lives With alone   Living Arrangements apartment   Home Accessibility stairs within home   Number of Stairs, Within Home, Primary 1   Stair Railings, Within Home, Primary none   Transportation Anticipated family or friend will provide   Self-Care   Usual Activity Tolerance moderate   Current Activity Tolerance poor   Equipment Currently Used at Home walker, rolling   Functional Level Prior   Ambulation 1-->assistive equipment   Transferring 1-->assistive equipment   Fall history within last six months no   General Information   Onset of Illness/Injury or Date of Surgery - Date 08/28/20   Referring Physician Topher Cárdenas PA    Patient/Family Goals Statement go to TCU   Pertinent History of Current Problem (include personal factors and/or comorbidities that impact the POC) Pt returned to hosptial after dicharge following L TKA on 8/26/2020.  She went home but had too much pain preventing her from being able to walk or do anything and daughter was unable to assist her.  Pain was uncontrolled and she presented to ED 8/28/2020 and was admitted under observation.   Precautions/Limitations fall precautions   Weight-Bearing Status - LLE weight-bearing as tolerated   Cognitive Status Examination   Orientation orientation to person, place and time   Level of Consciousness alert;lethargic/somnolent   Follows Commands and Answers Questions 100% of the time   Personal Safety and Judgment intact   Pain Assessment   Patient Currently in Pain Yes, see Vital Sign flowsheet  (7/10)   Integumentary/Edema   Integumentary/Edema Comments L knee incision covered by dressing   Posture    Posture Forward head position   Range of Motion (ROM)   ROM Comment L knee limited -14 to 65; others appear WFL   Strength   Strength Comments minimal LLE quad activtion, RLE WFL    Bed Mobility   Bed Mobility Comments Eladio with HOB elevated supine<>sit  "with need of assist to manage LE   Transfer Skills   Transfer Comments unable to assess secondary to lightheadedness with BP 98/55.  pt tolerated sitting EOB with SBA x2-3min with no LOB, but did not progress to standing as no resolve in lightheaded symptoms despite deep breathing and prolonged sitting time.   Gait   Gait Comments unable to assess today   Balance   Balance Comments sitting balance EOB with SBA x3min, unable to assess standing   General Therapy Interventions   Planned Therapy Interventions bed mobility training;gait training;ROM;strengthening;transfer training   Clinical Impression   Criteria for Skilled Therapeutic Intervention yes, treatment indicated   PT Diagnosis impaired gait/transfers   Influenced by the following impairments pain, decreased L knee ROM, weakness, lightheaded symptoms/lower BP   Functional limitations due to impairments decreased IND and safety with functional mobility   Clinical Presentation Stable/Uncomplicated   Clinical Decision Making (Complexity) Low complexity   Therapy Frequency Daily   Predicted Duration of Therapy Intervention (days/wks) 2days   Anticipated Discharge Disposition Transitional Care Facility   Risk & Benefits of therapy have been explained Yes   Patient, Family & other staff in agreement with plan of care Yes   [facility] AM-PAC  \"6 Clicks\" V.2 Basic Mobility Inpatient Short Form   1. Turning from your back to your side while in a flat bed without using bedrails? 3 - A Little   2. Moving from lying on your back to sitting on the side of a flat bed without using bedrails? 3 - A Little   3. Moving to and from a bed to a chair (including a wheelchair)? 3 - A Little   4. Standing up from a chair using your arms (e.g., wheelchair, or bedside chair)? 2 - A Lot   5. To walk in hospital room? 2 - A Lot   6. Climbing 3-5 steps with a railing? 2 - A Lot   Basic Mobility Raw Score (Score out of 24.Lower scores equate to lower levels of function) 15   Total " Evaluation Time   Total Evaluation Time (Minutes) 15

## 2020-08-29 NOTE — PLAN OF CARE
A&O. VSS. CMS intact. PRN oxy for pain. Dressing CDI. Reg diet. Assist 1 walker gb. Voiding in BR.

## 2020-08-29 NOTE — CONSULTS
Care Transition Initial Assessment - FREIDA     Met with: Patient    Active Problems:    Post-operative pain       DATA  Who is your support system?: Children  Identified issues/concerns regarding health management: discharge needs   Resources List: Skilled Nursing Facility     ASSESSMENT  Cognitive Status:  awake, alert and oriented  Concerns to be addressed: discharge needs.  SW consulted to assist with discharge planning, emotional support and transportation arrangements.  Pt is a 70 yr old  female who admitted Observation status on 8/28/20 with post operative pain. Patient had unsuccessful discharge from [facility] on 8/27 with HC-RN/PT, post TKA. ED CC referred patient to [facility] where she was accepted pending insurance auth.    INTERVENTION:  SW left VM for both [facility] Liaison and the weekend admissions person ([phone]) and awaiting return call re: status of ins auth.    ADDENDUM  SW received VM back from [facility], indicating they have still not gotten an auth.  FREIDA met with pt and dtr Lesly who expressed disinterest in [facility]. FREIDA provided [facility] TCU list of which pt/dtr provided additional TCU choices.    FREIDA sent referrals to;    1-[facility]  2-[facility]  3-[facility]     PLAN  Financial costs for the patient includes TBD .  Patient given options and choices for discharge SNF list offered .  Patient/family is agreeable to the plan?  Yes:   Transportation/person available to transport on day of discharge  is TBD and have they been notified/set up TBD  Patient Goals and Preferences: TCU .  Patient anticipates discharging to:  TCU .    SW to continue to follow and assist with discharge planning.    APPLE Larsen  Daytime (8:00am-4:30pm): [phone]  After-Hours SW Pager (4:30pm-11:30pm): [phone]

## 2020-08-29 NOTE — PLAN OF CARE
Discharge Planner PT   Patient plan for discharge: TCU  Current status: Chart reviewed, eval completed, treatment initiated.  Pt has returned to hospital due to poor pain control and limited function after having L TKA on 8/26 and discharging home where daughter was unable to assist at the level needed.  She has plans to go to TCU now rather than return home.  Pt able to perform supine<>sit transfers with Eladio for LE management and sat EOB with SBA x2-3 min but had constant lightheadedness that did not improve, BP 98/55 in sitting.  Due to symptoms returned pt to supine and reviewed TKA exercises with cues for correct technique as needed.  Pt has very limited quad activation and is unable to perform SAQ in full range or complete SLR.  Quad activation did improve during quad set with verbal and tactile cues/feedback, but remained minimal. Unable to assess standing activity today and will attempt next session.  Barriers to return to prior living situation: pain, fall risk, increased level of assist  Recommendations for discharge: TCU  Rationale for recommendations: Pt is moving below baseline for functional mobility tasks and was unsuccessful at first attempt at discharge home.  She will require ongoing therapy in IP/TCU setting to improve functional mobility to promote safe and successful return home and to baseline mobility.       Entered by: Leticia Koch 08/29/2020 12:05 PM

## 2020-08-30 ENCOUNTER — APPOINTMENT (OUTPATIENT)
Dept: PHYSICAL THERAPY | Facility: CLINIC | Age: 70
End: 2020-08-30
Payer: COMMERCIAL

## 2020-08-30 VITALS
WEIGHT: 239.2 LBS | OXYGEN SATURATION: 92 % | BODY MASS INDEX: 40.84 KG/M2 | SYSTOLIC BLOOD PRESSURE: 99 MMHG | TEMPERATURE: 99.3 F | HEART RATE: 87 BPM | DIASTOLIC BLOOD PRESSURE: 89 MMHG | RESPIRATION RATE: 16 BRPM | HEIGHT: 64 IN

## 2020-08-30 LAB
HGB BLD-MCNC: 10.3 G/DL (ref 11.7–15.7)
POTASSIUM SERPL-SCNC: 3.7 MMOL/L (ref 3.4–5.3)

## 2020-08-30 PROCEDURE — 36415 COLL VENOUS BLD VENIPUNCTURE: CPT | Performed by: PHYSICIAN ASSISTANT

## 2020-08-30 PROCEDURE — 99217 ZZC OBSERVATION CARE DISCHARGE: CPT | Performed by: PHYSICIAN ASSISTANT

## 2020-08-30 PROCEDURE — 25000132 ZZH RX MED GY IP 250 OP 250 PS 637: Performed by: ORTHOPAEDIC SURGERY

## 2020-08-30 PROCEDURE — 97116 GAIT TRAINING THERAPY: CPT | Mod: GP

## 2020-08-30 PROCEDURE — 25000132 ZZH RX MED GY IP 250 OP 250 PS 637: Performed by: INTERNAL MEDICINE

## 2020-08-30 PROCEDURE — 85018 HEMOGLOBIN: CPT | Performed by: PHYSICIAN ASSISTANT

## 2020-08-30 PROCEDURE — 84132 ASSAY OF SERUM POTASSIUM: CPT | Performed by: PHYSICIAN ASSISTANT

## 2020-08-30 PROCEDURE — G0378 HOSPITAL OBSERVATION PER HR: HCPCS

## 2020-08-30 PROCEDURE — 25000132 ZZH RX MED GY IP 250 OP 250 PS 637: Performed by: PHYSICIAN ASSISTANT

## 2020-08-30 PROCEDURE — 97110 THERAPEUTIC EXERCISES: CPT | Mod: GP

## 2020-08-30 RX ORDER — OXYCODONE HYDROCHLORIDE 5 MG/1
5-10 TABLET ORAL EVERY 4 HOURS PRN
Qty: 14 TABLET | Refills: 0 | Status: ON HOLD | OUTPATIENT
Start: 2020-08-30 | End: 2024-07-30

## 2020-08-30 RX ADMIN — RISPERIDONE 1 MG: 1 TABLET ORAL at 09:08

## 2020-08-30 RX ADMIN — GABAPENTIN 900 MG: 300 CAPSULE ORAL at 09:07

## 2020-08-30 RX ADMIN — BUDESONIDE AND FORMOTEROL FUMARATE DIHYDRATE 2 PUFF: 80; 4.5 AEROSOL RESPIRATORY (INHALATION) at 06:14

## 2020-08-30 RX ADMIN — CALCIUM CARBONATE (ANTACID) CHEW TAB 500 MG 1000 MG: 500 CHEW TAB at 12:28

## 2020-08-30 RX ADMIN — FUROSEMIDE 20 MG: 20 TABLET ORAL at 09:08

## 2020-08-30 RX ADMIN — FLUOXETINE 20 MG: 20 CAPSULE ORAL at 09:07

## 2020-08-30 RX ADMIN — ACETAMINOPHEN 650 MG: 325 TABLET, FILM COATED ORAL at 13:25

## 2020-08-30 RX ADMIN — POTASSIUM CHLORIDE 20 MEQ: 1500 TABLET, EXTENDED RELEASE ORAL at 09:08

## 2020-08-30 RX ADMIN — OXYCODONE HYDROCHLORIDE 10 MG: 5 TABLET ORAL at 13:25

## 2020-08-30 RX ADMIN — OXYCODONE HYDROCHLORIDE 10 MG: 5 TABLET ORAL at 09:08

## 2020-08-30 RX ADMIN — VARENICLINE TARTRATE 1 MG: 1 TABLET, FILM COATED ORAL at 09:08

## 2020-08-30 RX ADMIN — ACETAMINOPHEN 650 MG: 325 TABLET, FILM COATED ORAL at 01:12

## 2020-08-30 RX ADMIN — OXYCODONE HYDROCHLORIDE 10 MG: 5 TABLET ORAL at 04:59

## 2020-08-30 RX ADMIN — ACETAMINOPHEN 650 MG: 325 TABLET, FILM COATED ORAL at 04:59

## 2020-08-30 RX ADMIN — OXYCODONE HYDROCHLORIDE 10 MG: 5 TABLET ORAL at 01:12

## 2020-08-30 RX ADMIN — ACETAMINOPHEN 650 MG: 325 TABLET, FILM COATED ORAL at 09:08

## 2020-08-30 RX ADMIN — ASPIRIN 325 MG: 325 TABLET, COATED ORAL at 09:07

## 2020-08-30 RX ADMIN — DOCUSATE SODIUM AND SENNOSIDES 2 TABLET: 8.6; 5 TABLET, FILM COATED ORAL at 09:08

## 2020-08-30 ASSESSMENT — MIFFLIN-ST. JEOR: SCORE: 1587.06

## 2020-08-30 NOTE — DISCHARGE SUMMARY
[facility]    Discharge Summary  Hospitalist    Date of Admission:  8/28/2020  Date of Discharge:  8/30/2020  Discharging Provider: Topher Cárdenas  Date of Service (when I saw the patient): 08/30/20    Discharge Diagnoses   1. Status post left total knee arthroplasty, 08/26/2020  2. Uncontrolled postoperative pain  3. Anemia  4. Hypertension  5. Anxiety  6. History of tobacco use   7. Bilateral lower extremity edema, chronic  8. Chronic obstructive pulmonary disease/asthma  9. Mild hypokalemia      History of Present Illness   Helga Geronimo is a pleasant 70-year-old female with a past medical history of hypertension, asthma, chronic obstructive pulmonary disease, depression, obesity and lower extremity edema who recently underwent left total knee arthroplasty on 08/26/2020.  She had been discharged from the hospital to home with assistance of family and home care, but pain became unbearable, thus, she re-presented to the Emergency Department for Transitional Care Unit placement.  She is registered under observation.     Hospital Course      Status post left total knee arthroplasty, 08/26/2020.    Procedure was without immediate complications.  The patient was ultimately discharged to home 08/27, but had increased pain.  Home care nurse evaluated the patient and recommended coming to the Emergency Room.  The patient's daughter is involved with her care.  Care coordinator consulted from the Emergency Room and offered a Transitional Care Unit option; however, the patient's daughter was not agreeable for the patient going to that particular facility.  Please see care coordinator notes for additional details.    ----Ultimately, the patient is registered to observation for her pain control and for safe disposition/placement planning.    --Pain control: Oxycodone ordered 5-10 mg q.4 hours as needed,  Tylenol available.  Ice and heat as indicated.  Elevate leg.    --The patient's orthopedic surgeon,  Dr. Yu was consulted.  --Repeat imaging of the left knee and pelvis does not show any acute fracture or hardware complications.  --Physical Therapy eval completed, recommend TCU.    --Social work consulted, they have found TCU placement for the patient today 8/30  --Discussed plans with daughter at bedside, she is in agreement.     Anemia.   --Hemoglobin 08/27 is 11.6--11.3--10.3     Hypertension.    --Continue prior to admission Losartan/hydrochlorothiazide 100/12.5 mg at bedtime with hold parameter.      Anxiety.    --Resume prior to admission psychiatric medications, Risperdal, trazodone, Vistaril and Prozac.     History of tobacco use.    --Resume prior to admission Chantix.      Bilateral lower extremity edema, chronic.    --Resume prior to admission Lasix.      Chronic obstructive pulmonary disease/asthma.    Lungs are clear on exam.  Breathing comfortably.    --Resume prior to admission Symbicort twice a day.  Albuterol as needed.      Mild hypokalemia.      --Resolved, potassium now 3.7     DVT prophylaxis.    --Aspirin 325 mg twice daily per Orthopedics.       Topher Cárdenas PA-C    Significant Results and Procedures   No procedures this hospitalization.  Test results as documented.    Pending Results   None    Code Status   Full Code       Primary Care Physician   Jennie Morales    Physical Exam   Temp: 99.3  F (37.4  C) Temp src: Oral BP: 99/89 Pulse: 87   Resp: 16 SpO2: 92 % O2 Device: None (Room air)    Vitals:    08/28/20 1107 08/30/20 0651   Weight: 109.8 kg (242 lb) 108.5 kg (239 lb 3.2 oz)     Vital Signs with Ranges  Temp:  [98.4  F (36.9  C)-99.3  F (37.4  C)] 99.3  F (37.4  C)  Pulse:  [86-95] 87  Resp:  [14-16] 16  BP: ()/(56-89) 99/89  SpO2:  [91 %-94 %] 92 %  I/O last 3 completed shifts:  In: 890 [P.O.:890]  Out: -     Constitutional: Alert, oriented to self, place, date, situation.  Cooperative, pleasant, lying in bed no apparent distress.  Respiratory:  Lungs CTAB.  No  crackles, wheezes, or rhonchi, no labored breathing.  Cardiovascular:  Heart RRR, no MRG, bilateral lower extremity edema 1-2+, slightly greater on left side.  EDU stockings in place.  GI:  Abdomen soft, NT/ND and with normoactive BS  Skin/Integumen:  Warm, dry, non-diaphoretic.  No rashes on exposed skin.  MSK: CMS x4 grossly intact.    Discharge Disposition   Discharged to rehabilitation facility  Condition at discharge: Stable    Consultations This Hospital Stay   SOCIAL WORK IP CONSULT  PHYSICAL THERAPY ADULT IP CONSULT  ORTHOPEDIC SURGERY IP CONSULT    Discharge Orders      Anti-Embolism Stockings    Bilateral below knee length.On in the morning, off at night     POST-OP FOLLOW-UP VISIT    As originally scheduled     General info for SNF    Length of Stay Estimate: Short Term Care: Estimated # of Days   5 to 7  days  Condition at Discharge: Improving  Level of care:skilled   Rehabilitation Potential: Good  Admission H&P remains valid and up-to-date: Yes  Recent Chemotherapy: N/A  Use Nursing Home Standing Orders: N/A     Activity - Up ad justine     Activity - Ambulate in hallway    Every shift     Weight bearing status    wbat     Activity - Up with nursing assistance     Activity - Ambulate in hallway    Every shift     Weight bearing status    Full wbat     Wound care    Site:   kneeInstructions: change  Every  Other day and  Prn saturation     Reason for your hospital stay    Post- op knee surgery pain     Follow Up and recommended labs and tests    Follow up with Orthopedic surgery, Dr. Yu, within 2 weeks.  Follow up BMP recommended within 7 days.     Physical Therapy Adult Consult    Evaluate and treat as clinically indicated.    Reason:   Total knee  No  restrictions     Advance Diet as Tolerated    Follow this diet upon discharge:  Try and keep to  [address]     Discharge Medications   Current Discharge Medication List      START taking these medications    Details   potassium chloride ER (KLOR-CON M) 20  MEQ CR tablet Take 1 tablet (20 mEq) by mouth daily  Qty:  , Refills: 0    Associated Diagnoses: Hypokalemia         CONTINUE these medications which have CHANGED    Details   oxyCODONE (ROXICODONE) 5 MG tablet Take 1-2 tablets (5-10 mg) by mouth every 4 hours as needed for pain (Moderate to Severe)  Qty: 14 tablet, Refills: 0    Associated Diagnoses: Hx of total knee replacement, left         CONTINUE these medications which have NOT CHANGED    Details   acetaminophen (TYLENOL) 325 MG tablet Take 2 tablets (650 mg) by mouth every 4 hours as needed for other (mild pain)  Qty: 100 tablet, Refills: 0    Associated Diagnoses: Hx of total knee replacement, left      albuterol (PROAIR HFA/PROVENTIL HFA/VENTOLIN HFA) 108 (90 Base) MCG/ACT inhaler Inhale 2 puffs into the lungs daily as needed for shortness of breath / dyspnea or wheezing    Comments: Pharmacy may dispense brand covered by insurance (Proair, or proventil or ventolin or generic albuterol inhaler)      amoxicillin (AMOXIL) 500 MG capsule Take 2,000 mg by mouth daily as needed (1 hour prior to dental work)      aspirin (ASA) 325 MG EC tablet Take 1 tablet (325 mg) by mouth 2 times daily (with meals)  Qty: 42 tablet, Refills: 0    Associated Diagnoses: Hx of total knee replacement, left      budesonide-formoterol (SYMBICORT) 80-4.5 MCG/ACT Inhaler Inhale 2 puffs into the lungs 2 times daily      calcium-vitamin D-vitamin K (VIACTIV) 500-500-40 MG-UNT-MCG CHEW Take 1 tablet by mouth At Bedtime       !! FLUoxetine (PROZAC) 20 MG capsule Take 60 mg by mouth At Bedtime (takes 3 x 20mg)    (in addition to AM dose)      !! FLUoxetine (PROZAC) 20 MG capsule Take 20 mg by mouth every morning (in addition to PM dose)      furosemide (LASIX) 20 MG tablet Take 20 mg by mouth every morning      gabapentin (NEURONTIN) 300 MG capsule Take 900 mg by mouth 2 times daily (Take 3 X 300 mg = 900 mg dose)      hydrOXYzine (VISTARIL) 25 MG capsule Take 1 capsule (25 mg) by mouth 3  times daily as needed for itching or other (pain mgt)  Qty: 40 capsule, Refills: 0    Associated Diagnoses: Total knee replacement status, left      ibuprofen (ADVIL/MOTRIN) 600 MG tablet Take 1 tablet (600 mg) by mouth every 6 hours as needed for pain (mild)  Qty: 30 tablet, Refills: 0    Associated Diagnoses: Hx of total knee replacement, left      losartan-hydrochlorothiazide (HYZAAR) 100-12.5 MG tablet Take 1 tablet by mouth At Bedtime       multivitamin, therapeutic (THERA-VIT) TABS tablet Take 1 tablet by mouth At Bedtime       ondansetron (ZOFRAN-ODT) 4 MG ODT tab Take 1-2 tablets (4-8 mg) by mouth every 8 hours as needed for nausea Dissolve ON the tongue.  Qty: 4 tablet, Refills: 0    Associated Diagnoses: Hx of total knee replacement, left      !! risperiDONE (RISPERDAL) 0.5 MG tablet Take 0.5 mg by mouth daily as needed (MIDDAY if needed) (in addition to morning and afternoon doses)      !! risperiDONE (RISPERDAL) 1 MG tablet Take 1 mg by mouth 2 times daily (morning and afternoon at 4pm)      senna-docusate (SENOKOT-S/PERICOLACE) 8.6-50 MG tablet Take 1-2 tablets by mouth 2 times daily Take while on oral narcotics to prevent or treat constipation.  Qty: 30 tablet, Refills: 0    Comments: While taking narcotics  Associated Diagnoses: Hx of total knee replacement, left      tiotropium (SPIRIVA RESPIMAT) 2.5 MCG/ACT inhaler Inhale 2 puffs into the lungs every morning       traZODone (DESYREL) 100 MG tablet Take 100 mg by mouth At Bedtime       varenicline (CHANTIX) 1 MG tablet Take 1 mg by mouth 2 times daily       !! - Potential duplicate medications found. Please discuss with provider.      STOP taking these medications       traMADol (ULTRAM) 50 MG tablet Comments:   Reason for Stopping:             Allergies   Allergies   Allergen Reactions     Atorvastatin      Lipitor     Colesevelam Nausea and Vomiting     Hmg-Coa-R Inhibitors Other (See Comments)     Aches       Lisinopril Cough     Pravastatin Other  (See Comments) and Muscle Pain (Myalgia)     myalgia     Rosuvastatin      crestor     Data   Most Recent 3 CBC's:  Recent Labs   Lab Test 08/30/20  0705 08/29/20  0640 08/27/20 0647 11/21/19 0627 05/02/17 1007   WBC  --  8.4  --  8.9 6.3   HGB 10.3* 11.3* 11.6* 10.2* 12.0   MCV  --  97  --  94 97   PLT  --  214  --  191 199      Most Recent 3 BMP's:  Recent Labs   Lab Test 08/30/20  0705 08/29/20  0640 08/27/20 0647  11/21/19 0627   NA  --  136 140  --  139   POTASSIUM 3.7 3.3* 4.1   < > 3.6   CHLORIDE  --  103 107  --  107   CO2  --  30 31  --  31   BUN  --  14 13  --  16   CR  --  0.86 0.92  --  0.97   ANIONGAP  --  3 2*  --  1*   BRAYAN  --  8.1* 8.0*  --  7.5*   GLC  --  103* 124*  --  115*    < > = values in this interval not displayed.     Most Recent 2 LFT's:  Recent Labs   Lab Test 05/02/17 1007 06/17/16  1731   AST 13 23   ALT 20 30   ALKPHOS 65 55   BILITOTAL 0.4 0.2     Most Recent INR's and Anticoagulation Dosing History:  Anticoagulation Dose History     Recent Dosing and Labs Latest Ref Rng & Units 12/2/2009 12/3/2009 12/4/2009 12/5/2009 12/10/2009 12/10/2009 12/10/2009    INR 0.86 - 1.14 0.94 1.20(H) 1.42(H) 1.44(H) 2.61(H) 1.31(H) 1.25(H)        Most Recent 3 Troponin's:  Recent Labs   Lab Test 05/02/17 1007 06/17/16  1731   TROPI <0.015  The 99th percentile for upper reference range is 0.045 ug/L.  Troponin values in   the range of 0.045 - 0.120 ug/L may be associated with risks of adverse   clinical events.   <0.015  The 99th percentile for upper reference range is 0.045 ug/L.  Troponin values in   the range of 0.045 - 0.120 ug/L may be associated with risks of adverse   clinical events.       Most Recent Cholesterol Panel:No lab results found.  Most Recent 6 Bacteria Isolates From Any Culture (See EPIC Reports for Culture Details):No lab results found.  Most Recent TSH, T4 and A1c Labs:No lab results found.  Results for orders placed or performed during the hospital encounter of 08/28/20   XR  Knee Left 1/2 Views    Narrative    KNEE ONE-TWO VIEWS LEFT  8/28/2020 12:08 PM     HISTORY: pod 2, pain    COMPARISON: 8/26/2020      Impression    IMPRESSION: Status post left total knee arthroplasty. No hardware  complication. Interval resolution of subcutaneous emphysema. Residual  knee joint effusion, anterior knee soft tissue swelling, and skin  staples. No acute fracture or malalignment.    ENMA GOLDBERG MD   XR Pelvis 1/2 Views    Narrative    XR PELVIS 1/2 VW  8/28/2020 12:09 PM     HISTORY: left hip pain    COMPARISON: 12/10/2009      Impression    IMPRESSION: Status post bilateral total hip arthroplasties. No  hardware complication. No acute fracture or malalignment. Mild  bilateral sacroiliac joint degenerative changes. Severe degenerative  changes of the lower lumbar spine. Osteopenia.     ENMA GOLDBERG MD

## 2020-08-30 NOTE — PROGRESS NOTES
SW:  Discharge Planner   Discharge Plans in progress: [facility]  Barriers to discharge plan: none identified  Follow up plan:   -FREIDA confirmed that TCU could still accept today  -FREIDA epaged MD for orders  -FREIDA scheduled transport per pt/family wishes, making patient aware of transport costs.  -FREIDA updated charge nurse of plan  -FREIDA spoke to pt and dtr Lesly to answer questions and confirm plan        Entered by: Lorin Camp 08/30/2020 1:15 PM       DC orders: faxed to: [phone]  Scripts: FAXED  PAS: COMPLETED, FAXED TO FACILITY AND PLACED ON CHART  Trans: [facility] at 1500    FREIDA has identified no other social service needs at this time. SW will remain available should other needs arise.    APPLE Larsen  Daytime (8:00am-4:30pm): [phone]  After-Hours SW Pager (4:30pm-11:30pm): [phone]

## 2020-08-30 NOTE — PLAN OF CARE
Discharge Planner PT   Patient plan for discharge: TCU  Current status: Pt transfers out of bed with Eladio and then stands with CGA and FWW.  She ambulated 75-100ft with CGA and FWW and cues for increased heel strike and quad activation during stance.  Education on the importance of quad activation to prevent knee buckling.  1 episode of knee bucking while turning around to sit in bed, pt self recovering with FWW support.  Eladio to return to return to supine. Education also on the pulling/stretching felt in the posterior knee with increased heel strike due to lack of extension, that the stretch is good as long as it is tolerable discomfort.  Quad sets performed with pt having ongoing difficulty requiring verbal and tactile cues for improved quad activation.  SAQ and seated heel slides also performed with knee flexion ROM of 71deg today. Instructed pt to perform other TKA exercises later today with handout provided for cues.  Barriers to return to prior living situation: pain, fall risk, increased level of assist  Recommendations for discharge: TCU  Rationale for recommendations: Pt is moving below baseline for functional mobility tasks and was unsuccessful at first attempt at discharge home.  She will require ongoing therapy in IP/TCU setting to improve functional mobility to promote safe and successful return home and to baseline mobility.       Entered by: Leticia Koch 08/30/2020 12:15 PM

## 2020-08-30 NOTE — PROGRESS NOTES
SW:  Discharge Planner   Discharge Plans in progress: Patient accepted and and got insurance auth for [facility]  Barriers to discharge plan: Patient decision/acceptance of TCU  Follow up plan:   -FREIDA followed up with [facility], [facility] and [facility], all of who are not doing weekend admissions.  -FREIDA spoke to dtr Lesly via cell phone and also discussed directly with patient that auth was received by [facility] and they can admit her today. Family discussing options and will notify this writer of their decision.    SW to continue to follow and assist with discharge planning.    APPLE Larsen  Daytime (8:00am-4:30pm): [phone]  After-Hours SW Pager (4:30pm-11:30pm): [phone]            Entered by: Lorin Camp 08/30/2020 11:32 AM

## 2020-08-30 NOTE — PLAN OF CARE
A&O. VSS. CMS intact. Dressing, dried drainage. PRN oxy and tylenol. Assist 1 walker gb. Reg diet.

## 2020-08-30 NOTE — PROGRESS NOTES
Patient vital signs are at baseline: Yes  Patient able to ambulate as they were prior to admission or with assist devices provided by therapies during their stay:  Yes  Patient MUST void prior to discharge:  Yes, voiding in the bathroom  Patient able to tolerate oral intake:  Yes  Pain has adequate pain control using Oral analgesics:  Yes, controlled w/ Oxycodone and Tylenol     A&O, VSS on RA. CMS intact. Voiding in bathroom, Ax1. Pain managed with oxy.

## 2020-08-31 NOTE — PLAN OF CARE
Physical Therapy Discharge Summary    Reason for therapy discharge:    Discharged to transitional care facility.    Progress towards therapy goal(s). See goals on Care Plan in [facility] electronic health record for goal details.  Goals not met.  Barriers to achieving goals:   discharge from facility.    Therapy recommendation(s):    Continued therapy is recommended.  Rationale/Recommendations:  To progress independence and safety with functional mobility.

## 2020-10-22 ENCOUNTER — THERAPY VISIT (OUTPATIENT)
Dept: PHYSICAL THERAPY | Facility: CLINIC | Age: 70
End: 2020-10-22
Attending: ORTHOPAEDIC SURGERY
Payer: COMMERCIAL

## 2020-10-22 DIAGNOSIS — Z96.619 AFTERCARE FOLLOWING SHOULDER JOINT REPLACEMENT SURGERY, UNSPECIFIED LATERALITY: Primary | ICD-10-CM

## 2020-10-22 DIAGNOSIS — Z47.1 AFTERCARE FOLLOWING SHOULDER JOINT REPLACEMENT SURGERY, UNSPECIFIED LATERALITY: Primary | ICD-10-CM

## 2020-10-22 PROCEDURE — 97161 PT EVAL LOW COMPLEX 20 MIN: CPT | Mod: GP | Performed by: PHYSICAL THERAPIST

## 2020-10-22 PROCEDURE — 97110 THERAPEUTIC EXERCISES: CPT | Mod: GP | Performed by: PHYSICAL THERAPIST

## 2020-10-22 ASSESSMENT — ACTIVITIES OF DAILY LIVING (ADL)
RISE FROM A CHAIR: ACTIVITY IS MINIMALLY DIFFICULT
LIMPING: NOT ANSWERED
GO DOWN STAIRS: NOT ANSWERED
GIVING WAY, BUCKLING OR SHIFTING OF KNEE: NOT ANSWERED
STIFFNESS: I HAVE THE SYMPTOM BUT IT DOES NOT AFFECT MY ACTIVITY
AS_A_RESULT_OF_YOUR_KNEE_INJURY,_HOW_WOULD_YOU_RATE_YOUR_CURRENT_LEVEL_OF_DAILY_ACTIVITY?: NEARLY NORMAL
KNEE_ACTIVITY_OF_DAILY_LIVING_SUM: 11
PAIN: THE SYMPTOM AFFECTS MY ACTIVITY SLIGHTLY
STAND: NOT ANSWERED
WEAKNESS: NOT ANSWERED
KNEEL ON THE FRONT OF YOUR KNEE: NOT ANSWERED
SQUAT: NOT ANSWERED
SWELLING: NOT ANSWERED
HOW_WOULD_YOU_RATE_THE_OVERALL_FUNCTION_OF_YOUR_KNEE_DURING_YOUR_USUAL_DAILY_ACTIVITIES?: ABNORMAL
GO UP STAIRS: ACTIVITY IS VERY DIFFICULT
WALK: NOT ANSWERED
SIT WITH YOUR KNEE BENT: NOT ANSWERED
HOW_WOULD_YOU_RATE_THE_CURRENT_FUNCTION_OF_YOUR_KNEE_DURING_YOUR_USUAL_DAILY_ACTIVITIES_ON_A_SCALE_FROM_0_TO_100_WITH_100_BEING_YOUR_LEVEL_OF_KNEE_FUNCTION_PRIOR_TO_YOUR_INJURY_AND_0_BEING_THE_INABILITY_TO_PERFORM_ANY_OF_YOUR_USUAL_DAILY_ACTIVITIES?: 50

## 2020-10-22 NOTE — PROGRESS NOTES
[facility] Initial Evaluation  Subjective:    Therapist Generated HPI Evaluation  Problem details: Pt presents s/p L TKA 8-26-20. Pt reported she was discharged home on 8-27-20 but returned to ED on 8-28-20 secondary to pain. Pt reported she was sent a a TCU for approximately 6 days and then returned home. Pt reported initial assistance from her sister after she returned home. Pt reported receiving home care PT up until 2 weeks ago. Pt reported follow-up with surgeon a couple of weeks ago with recommendations for out-patient PT. Pt reported she lives in an apartment on the first floor, therefore does not need to navigate stairs. Pt reported she has resumed functional tasks such as grocery shopping/driving.         Type of problem:  Left knee.      Condition occurred with:  Degenerative joint disease.    Patient reports pain:  Anterior, lateral and in the joint.  Pain is described as aching and is constant.  Pain is worse during the day.  Since onset symptoms are gradually improving.   and relieved by rest and ice.                              Objective:    Gait:  Pt ambulated to clinic with use of SEC. Absence of deviations noted during gait with/without assistive device.  Assistive Devices:  Cane                                                        Knee Evaluation:  ROM:    AROM      Extension: Left: 3    Right:   Flexion: Left: 115   Right:        Strength:     Extension:  Left: 3-/5   Pain:                    Edema:  Not Assessed (Visual swelling noted of mid lower leg to ankle; pt reported history of swelling in L leg for 1 year; etiology of swelling unknown)            General     ROS    Assessment/Plan:    Patient is a 70 year old female with left side knee complaints.    Patient has the following significant findings with corresponding treatment plan.                Diagnosis 1:  S/p L TKA  Pain -  self management, education and home program  Decreased ROM/flexibility - therapeutic  exercise  Decreased strength - therapeutic exercise and therapeutic activities  Impaired muscle performance - neuro re-education  Decreased function - therapeutic activities    Therapy Evaluation Codes:   1) History comprised of:   Personal factors that impact the plan of care:      None.    Comorbidity factors that impact the plan of care are:      None.     Medications impacting care: None.  2) Examination of Body Systems comprised of:   Body structures and functions that impact the plan of care:      Knee.   Activity limitations that impact the plan of care are:      Squatting/kneeling, Stairs and transitional movements.  3) Clinical presentation characteristics are:   Stable/Uncomplicated.  Cumulative Therapy Evaluation is: Low complexity.    Previous and current functional limitations:  (See Goal Flow Sheet for this information)    Short term and Long term goals: (See Goal Flow Sheet for this information)     Communication ability:  Patient appears to be able to clearly communicate and understand verbal and written communication and follow directions correctly.  Treatment Explanation - The following has been discussed with the patient:   RX ordered/plan of care  Anticipated outcomes  Possible risks and side effects  This patient would benefit from PT intervention to resume normal activities.   Rehab potential is excellent.    Frequency:  1-2 X week, once daily  Duration:  for 6 visits  Discharge Plan:  Independent in home treatment program.  Reach maximal therapeutic benefit.    Please refer to the daily flowsheet for treatment today, total treatment time and time spent performing 1:1 timed codes.

## 2020-10-26 ENCOUNTER — THERAPY VISIT (OUTPATIENT)
Dept: PHYSICAL THERAPY | Facility: CLINIC | Age: 70
End: 2020-10-26
Payer: COMMERCIAL

## 2020-10-26 DIAGNOSIS — Z96.652 AFTERCARE FOLLOWING LEFT KNEE JOINT REPLACEMENT SURGERY: ICD-10-CM

## 2020-10-26 DIAGNOSIS — Z47.1 AFTERCARE FOLLOWING LEFT KNEE JOINT REPLACEMENT SURGERY: ICD-10-CM

## 2020-10-26 PROCEDURE — 97110 THERAPEUTIC EXERCISES: CPT | Mod: GP | Performed by: PHYSICAL THERAPIST

## 2020-10-26 PROCEDURE — 97530 THERAPEUTIC ACTIVITIES: CPT | Mod: GP | Performed by: PHYSICAL THERAPIST

## 2020-10-26 NOTE — PROGRESS NOTES
[facility] Initial Evaluation  Subjective:    Patient Health History         Pain is reported as 1/10 on pain scale.  General health as reported by patient is fair.  Pertinent medical history includes: depression, high blood pressure, overweight and smoking.        Surgeries include:  Orthopedic surgery. Other surgery history details: Knees, hips, shoulder.    Current medications:  Anti-depressants and high blood pressure medication.    Current occupation is Associate Clinical Rep.   Primary job tasks include:  Computer work and prolonged sitting.                                    Objective:  System    Physical Exam    General     ROS    Assessment/Plan:

## 2020-10-27 NOTE — PROGRESS NOTES
"Subjective:  HPI  Physical Exam                    Objective:  System    Physical Exam    General     ROS    Assessment/Plan:    DISCHARGE REPORT    Progress reporting period is from 01/08/2020 to 02/26/2020.       SUBJECTIVE  Subjective:  When last seen on 02/26/2020, patient reports her L shoulder was doing well.  She thought she \"overdoes it\" (i.e. moving boxes, cleaning closets) sometimes, but usually she just felt stiff the next morning.  She was contacted on 04/07/2020 and reported she was doing well--no further PT needed.  Current Pain level: 1/10.     Initial Pain level: 1/10.   Changes in function:  Unknown as patient did not return for additional follow-up visits.  Adverse reaction to treatment or activity: Unknown as patient did not return for additional follow-up visits.    OBJECTIVE  Objective:  As of 02/26/2020:  L shoulder AROM:  flexion 128 degrees, ER 68 degrees.  PROM:  flexion 133 degrees.   Current objective measurements are unavailable as patient did not return for additional follow-up visits.        ASSESSMENT/PLAN  Patient was seen for 12 visits with treatment focusing on ROM and strengthening exercises for the L shoulder.  She made steady progress throughout treatment and was doing well at her last visit.   Updated problem list and treatment plan: Diagnosis 1:  S/p L TSA   No updated problem list or treatment plan as patient did not return for additional visits and is discharged from PT at this time.    STG/LTGs have been met or progress has been made towards goals:  Unknown as patient did not return for additional follow-up visits.  Assessment of Progress: The patient has not returned to therapy. Current status is unknown.  Self Management Plans:  Patient has been instructed in a home treatment program.  Patient  has been instructed in self management of symptoms.  Helga continues to require the following intervention to meet STG and LTG's:  PT intervention is no longer required to meet " STG/LTG.    Recommendations:  Patient is discharged from PT as she did not return for further follow-up visits.    Please refer to the daily flowsheet for treatment today, total treatment time and time spent performing 1:1 timed codes.

## 2020-10-29 ENCOUNTER — THERAPY VISIT (OUTPATIENT)
Dept: PHYSICAL THERAPY | Facility: CLINIC | Age: 70
End: 2020-10-29
Payer: COMMERCIAL

## 2020-10-29 DIAGNOSIS — Z96.652 AFTERCARE FOLLOWING LEFT KNEE JOINT REPLACEMENT SURGERY: ICD-10-CM

## 2020-10-29 DIAGNOSIS — Z47.1 AFTERCARE FOLLOWING LEFT KNEE JOINT REPLACEMENT SURGERY: ICD-10-CM

## 2020-10-29 PROCEDURE — 97530 THERAPEUTIC ACTIVITIES: CPT | Mod: GP | Performed by: PHYSICAL THERAPIST

## 2020-10-29 PROCEDURE — 97110 THERAPEUTIC EXERCISES: CPT | Mod: GP | Performed by: PHYSICAL THERAPIST

## 2020-11-02 ENCOUNTER — THERAPY VISIT (OUTPATIENT)
Dept: PHYSICAL THERAPY | Facility: CLINIC | Age: 70
End: 2020-11-02
Payer: COMMERCIAL

## 2020-11-02 DIAGNOSIS — Z96.652 AFTERCARE FOLLOWING LEFT KNEE JOINT REPLACEMENT SURGERY: ICD-10-CM

## 2020-11-02 DIAGNOSIS — Z47.1 AFTERCARE FOLLOWING LEFT KNEE JOINT REPLACEMENT SURGERY: ICD-10-CM

## 2020-11-02 PROCEDURE — 97530 THERAPEUTIC ACTIVITIES: CPT | Mod: GP | Performed by: PHYSICAL THERAPIST

## 2020-11-02 PROCEDURE — 97110 THERAPEUTIC EXERCISES: CPT | Mod: GP | Performed by: PHYSICAL THERAPIST

## 2020-11-05 ENCOUNTER — THERAPY VISIT (OUTPATIENT)
Dept: PHYSICAL THERAPY | Facility: CLINIC | Age: 70
End: 2020-11-05
Payer: COMMERCIAL

## 2020-11-05 DIAGNOSIS — Z47.1 AFTERCARE FOLLOWING LEFT KNEE JOINT REPLACEMENT SURGERY: ICD-10-CM

## 2020-11-05 DIAGNOSIS — Z96.652 AFTERCARE FOLLOWING LEFT KNEE JOINT REPLACEMENT SURGERY: ICD-10-CM

## 2020-11-05 PROCEDURE — 97110 THERAPEUTIC EXERCISES: CPT | Mod: GP | Performed by: PHYSICAL THERAPIST

## 2020-11-05 PROCEDURE — 97530 THERAPEUTIC ACTIVITIES: CPT | Mod: GP | Performed by: PHYSICAL THERAPIST

## 2020-11-09 ENCOUNTER — THERAPY VISIT (OUTPATIENT)
Dept: PHYSICAL THERAPY | Facility: CLINIC | Age: 70
End: 2020-11-09
Payer: COMMERCIAL

## 2020-11-09 DIAGNOSIS — Z47.1 AFTERCARE FOLLOWING LEFT KNEE JOINT REPLACEMENT SURGERY: ICD-10-CM

## 2020-11-09 DIAGNOSIS — Z96.652 AFTERCARE FOLLOWING LEFT KNEE JOINT REPLACEMENT SURGERY: ICD-10-CM

## 2020-11-09 PROCEDURE — 97530 THERAPEUTIC ACTIVITIES: CPT | Mod: GP | Performed by: PHYSICAL THERAPIST

## 2020-11-09 PROCEDURE — 97110 THERAPEUTIC EXERCISES: CPT | Mod: GP | Performed by: PHYSICAL THERAPIST

## 2020-11-16 ENCOUNTER — THERAPY VISIT (OUTPATIENT)
Dept: PHYSICAL THERAPY | Facility: CLINIC | Age: 70
End: 2020-11-16
Payer: COMMERCIAL

## 2020-11-16 DIAGNOSIS — Z47.1 AFTERCARE FOLLOWING LEFT KNEE JOINT REPLACEMENT SURGERY: ICD-10-CM

## 2020-11-16 DIAGNOSIS — Z96.652 AFTERCARE FOLLOWING LEFT KNEE JOINT REPLACEMENT SURGERY: ICD-10-CM

## 2020-11-16 PROCEDURE — 97110 THERAPEUTIC EXERCISES: CPT | Mod: GP | Performed by: PHYSICAL THERAPIST

## 2020-11-16 ASSESSMENT — ACTIVITIES OF DAILY LIVING (ADL)
SWELLING: I DO NOT HAVE THE SYMPTOM
PAIN: I HAVE THE SYMPTOM BUT IT DOES NOT AFFECT MY ACTIVITY
SIT WITH YOUR KNEE BENT: ACTIVITY IS NOT DIFFICULT
HOW_WOULD_YOU_RATE_THE_OVERALL_FUNCTION_OF_YOUR_KNEE_DURING_YOUR_USUAL_DAILY_ACTIVITIES?: NEARLY NORMAL
RISE FROM A CHAIR: ACTIVITY IS MINIMALLY DIFFICULT
SQUAT: NOT ANSWERED
KNEE_ACTIVITY_OF_DAILY_LIVING_SUM: 49
GIVING WAY, BUCKLING OR SHIFTING OF KNEE: I DO NOT HAVE THE SYMPTOM
HOW_WOULD_YOU_RATE_THE_CURRENT_FUNCTION_OF_YOUR_KNEE_DURING_YOUR_USUAL_DAILY_ACTIVITIES_ON_A_SCALE_FROM_0_TO_100_WITH_100_BEING_YOUR_LEVEL_OF_KNEE_FUNCTION_PRIOR_TO_YOUR_INJURY_AND_0_BEING_THE_INABILITY_TO_PERFORM_ANY_OF_YOUR_USUAL_DAILY_ACTIVITIES?: 75
KNEEL ON THE FRONT OF YOUR KNEE: NOT ANSWERED
AS_A_RESULT_OF_YOUR_KNEE_INJURY,_HOW_WOULD_YOU_RATE_YOUR_CURRENT_LEVEL_OF_DAILY_ACTIVITY?: NEARLY NORMAL
STIFFNESS: THE SYMPTOM AFFECTS MY ACTIVITY SLIGHTLY
GO UP STAIRS: ACTIVITY IS SOMEWHAT DIFFICULT
GO DOWN STAIRS: ACTIVITY IS SOMEWHAT DIFFICULT
LIMPING: I HAVE THE SYMPTOM BUT IT DOES NOT AFFECT MY ACTIVITY
WEAKNESS: THE SYMPTOM AFFECTS MY ACTIVITY SLIGHTLY
STAND: ACTIVITY IS NOT DIFFICULT
WALK: ACTIVITY IS NOT DIFFICULT

## 2020-11-16 NOTE — LETTER
"[facility] ATHLETIC [facility] PHYSICAL THERAPY  [address] [place] [address]  [facility] [address] [phone]  2020    Re: Helga Geronimo   :   1950  MRN:  [id number]   REFERRING PHYSICIAN:   Shola Yu  [facility] ATHLETIC [facility] PHYSICAL [facility]  Date of Initial Evaluation:  10/22/2020  Visits:  Rxs Used: 7  Reason for Referral:  Aftercare following left knee joint replacement surgery    DISCHARGE REPORT  Progress reporting period is from 10-22-20 to 20.       SUBJECTIVE  Subjective changes noted by patient: Reported overall pleased with progress to date. Continues to note \"stiffness\" of left knee, specifically after prolonged sitting, but sx's slowly resolving    Current pain level is 0-1/10.  Previous pain level was  1/10.  Changes in function:  Yes (See Goal flowsheet attached for changes in current functional level)  Adverse reaction to treatment or activity: None    OBJECTIVE  Objective: Pt ambulated to the clinic without assistive device. Able to ascend/descend stairs with a reciprocal gait with use of railing. Knee ROM: flexion-125; extension-0.    ASSESSMENT/PLAN  Updated problem list and treatment plan: Diagnosis 1:  S/p L TKA   STG/LTGs have been met or progress has been made towards goals:  Yes (See Goal flow sheet completed today.)  Assessment of Progress: The patient has met all of their long term goals.  Self Management Plans:  Patient is independent in a home treatment program.  Helga continues to require the following intervention to meet STG and LTG's:  PT intervention is no longer required to meet STG/LTG.    Recommendations:  This patient is ready to be discharged from therapy and continue their home treatment program.    Thank you for your referral.    INQUIRIES  Therapist: Rohini Bloom, PT   [facility] PH[facility]ICAL THERAPY  [address] [facility] [address]  [facility] [address]  Phone: " [phone]  Fax: [phone]

## 2020-11-17 NOTE — PROGRESS NOTES
"DISCHARGE REPORT    Progress reporting period is from 10-22-20 to 11-16-20.       SUBJECTIVE  Subjective changes noted by patient: Reported overall pleased with progress to date. Continues to note \"stiffness\" of left knee, specifically after prolonged sitting, but sx's slowly resolving    Current pain level is 0-1/10.  Previous pain level was  1/10.  Changes in function:  Yes (See Goal flowsheet attached for changes in current functional level)  Adverse reaction to treatment or activity: None    OBJECTIVE  Objective: Pt ambulated to the clinic without assistive device. Able to ascend/descend stairs with a reciprocal gait with use of railing. Knee ROM: flexion-125; extension-0.    ASSESSMENT/PLAN  Updated problem list and treatment plan: Diagnosis 1:  S/p L TKA   STG/LTGs have been met or progress has been made towards goals:  Yes (See Goal flow sheet completed today.)  Assessment of Progress: The patient has met all of their long term goals.  Self Management Plans:  Patient is independent in a home treatment program.  Helga continues to require the following intervention to meet STG and LTG's:  PT intervention is no longer required to meet STG/LTG.    Recommendations:  This patient is ready to be discharged from therapy and continue their home treatment program.    Please refer to the daily flowsheet for treatment today, total treatment time and time spent performing 1:1 timed codes.        "

## 2021-07-08 ENCOUNTER — THERAPY VISIT (OUTPATIENT)
Dept: PHYSICAL THERAPY | Facility: CLINIC | Age: 71
End: 2021-07-08
Payer: COMMERCIAL

## 2021-07-08 DIAGNOSIS — M79.18 RIGHT BUTTOCK PAIN: ICD-10-CM

## 2021-07-08 PROCEDURE — 97110 THERAPEUTIC EXERCISES: CPT | Mod: GP | Performed by: PHYSICAL THERAPIST

## 2021-07-08 PROCEDURE — 97161 PT EVAL LOW COMPLEX 20 MIN: CPT | Mod: GP | Performed by: PHYSICAL THERAPIST

## 2021-07-08 ASSESSMENT — ACTIVITIES OF DAILY LIVING (ADL)
DEEP_SQUATTING: UNABLE TO DO
PUTTING_ON_SOCKS_AND_SHOES: MODERATE DIFFICULTY
GOING_DOWN_1_FLIGHT_OF_STAIRS: EXTREME DIFFICULTY
STEPPING_UP_AND_DOWN_CURBS: SLIGHT DIFFICULTY
WALKING_INITIALLY: SLIGHT DIFFICULTY
LIGHT_TO_MODERATE_WORK: MODERATE DIFFICULTY
GOING_UP_1_FLIGHT_OF_STAIRS: EXTREME DIFFICULTY
SITTING_FOR_15_MINUTES: NO DIFFICULTY AT ALL
WALKING_DOWN_STEEP_HILLS: MODERATE DIFFICULTY
ROLLING_OVER_IN_BED: SLIGHT DIFFICULTY
WALKING_APPROXIMATELY_10_MINUTES: SLIGHT DIFFICULTY
WALKING_15_MINUTES_OR_GREATER: MODERATE DIFFICULTY
GETTING_INTO_AND_OUT_OF_AN_AVERAGE_CAR: SLIGHT DIFFICULTY
HOW_WOULD_YOU_RATE_YOUR_CURRENT_LEVEL_OF_FUNCTION_DURING_YOUR_USUAL_ACTIVITIES_OF_DAILY_LIVING_FROM_0_TO_100_WITH_100_BEING_YOUR_LEVEL_OF_FUNCTION_PRIOR_TO_YOUR_HIP_PROBLEM_AND_0_BEING_THE_INABILITY_TO_PERFORM_ANY_OF_YOUR_USUAL_DAILY_ACTIVITIES?: 25
STANDING_FOR_15_MINUTES: MODERATE DIFFICULTY
RECREATIONAL_ACTIVITIES: EXTREME DIFFICULTY
TWISTING/PIVOTING_ON_INVOLVED_LEG: SLIGHT DIFFICULTY
HEAVY_WORK: MODERATE DIFFICULTY
WALKING_UP_STEEP_HILLS: EXTREME DIFFICULTY

## 2021-07-08 NOTE — PROGRESS NOTES
"Physical Therapy Initial Evaluation  Subjective:    Therapist Generated HPI Evaluation  Problem details: Pt presents with complaints of R low back/hip pain (posterior buttock). Pt reported onset of sx's this past winter; sx's progressively worsened over time. Pt reported MD office visit on 6-23-21. Imaging (x-rays) taken; pt reported she was told there was no \"disc space\" between the vertebra and a vertebra was \"out of alignment.\" Pt received an injection to the hip. Since MD visit, pt reported she has lost 10 lbs and low back pain has decreased; no change in posterior buttock sx's  Medical history significant for B ANGELINA's: 2009.         Type of problem:  Right hip.    This is a chronic condition.  Condition occurred with:  Insidious onset.  Where condition occurred: for unknown reasons.  Patient reports pain:  Posterior and other (R low back).  Pain is described as aching and is constant.  Pain is worse during the day.  Since onset symptoms are gradually improving (see above).  Symptoms are exacerbated by sitting, standing and walking  and relieved by heat.  Special tests included:  X-ray.                            Objective:    Gait:    Gait Type:  Normal   Assistive Devices:  None                 Lumbar/SI Evaluation  ROM:    AROM Lumbar:   Flexion:          Fingertips to ankles, no provocation of sx's  Ext:                    WFL in standing/lying, slight provocation of low back sx's with EIL   Side Bend:        Left:  WFL, no provocation of sx's    Right:  WFL, no provocation of sx's  Rotation:           Left:     Right:   Side Glide:        Left:     Right:                   Neural Tension/Mobility:      Left side:SLR  negative.     Right side:   SLR  negative.                                             Hip Evaluation  Hip PROM:    Flexion: Left: WFL   Right: WFL, slight provocation of groin sx's        Internal Rotation: Left: WFL    Right: WFL, slight provocation of groin sx's  External Rotation: Left: WFL  "   Right: WFL              Hip Strength:    Flexion:   Left: 4-/5   Pain:  Right: 4-/5   Pain:                    Extension:  Left: 3+/5  Pain:Right: 3+/5    Pain:    Abduction:  Right: 5/5     Pain:strong/pain free      External Rotation:  Right: 5/5    Pain: strong/pain free                           General     ROS    Assessment/Plan:    Patient is a 71 year old female with lumbar and right side hip complaints.    Patient has the following significant findings with corresponding treatment plan.                Diagnosis 1:  Low back pain/R buttock pain  Pain -  manual therapy, self management, education and home program  Decreased ROM/flexibility - manual therapy and therapeutic exercise  Decreased function - therapeutic activities    Therapy Evaluation Codes:   1) History comprised of:   Personal factors that impact the plan of care:      Past/current experiences and Time since onset of symptoms.    Comorbidity factors that impact the plan of care are:      Osteoarthritis and Overweight.     Medications impacting care: None.  2) Examination of Body Systems comprised of:   Body structures and functions that impact the plan of care:      Hip and Lumbar spine.   Activity limitations that impact the plan of care are:      Sitting, Standing and Walking.  3) Clinical presentation characteristics are:   Stable/Uncomplicated.  Cumulative Therapy Evaluation is: Low complexity.    Previous and current functional limitations:  (See Goal Flow Sheet for this information)    Short term and Long term goals: (See Goal Flow Sheet for this information)     Communication ability:  Patient appears to be able to clearly communicate and understand verbal and written communication and follow directions correctly.  Treatment Explanation - The following has been discussed with the patient:   RX ordered/plan of care  Anticipated outcomes  Possible risks and side effects  This patient would benefit from PT intervention to resume normal  activities.   Rehab potential is fair.    Frequency:  1 X week, once daily  Duration:  for 6-8 visits. Will contact MD's office for referral for low back pain; reports low back pain > hip pain.  Discharge Plan:  Independent in home treatment program.  Reach maximal therapeutic benefit.    Please refer to the daily flowsheet for treatment today, total treatment time and time spent performing 1:1 timed codes.

## 2021-07-09 NOTE — PROGRESS NOTES
Physical Therapy Initial Evaluation  Subjective:    Patient Health History         Pain is reported as 1/10 on pain scale.  General health as reported by patient is fair.  Pertinent medical history includes: asthma, depression, incontinence, high blood pressure, osteoarthritis, overweight and smoking.     Medical allergies: other. Other medical allergies details: Statin drugs.   Surgeries include:  Orthopedic surgery. Other surgery history details: Joint replacement.    Current medications:  Anti-inflammatory, anti-depressants, sleep medication and high blood pressure medication.    Current occupation is Admin Clinical Coord.   Primary job tasks include:  Computer work.                                    Objective:  System    Physical Exam    General     ROS    Assessment/Plan:

## 2021-07-12 ENCOUNTER — THERAPY VISIT (OUTPATIENT)
Dept: PHYSICAL THERAPY | Facility: CLINIC | Age: 71
End: 2021-07-12
Payer: COMMERCIAL

## 2021-07-12 DIAGNOSIS — M79.18 RIGHT BUTTOCK PAIN: ICD-10-CM

## 2021-07-12 PROCEDURE — 97140 MANUAL THERAPY 1/> REGIONS: CPT | Mod: GP | Performed by: PHYSICAL THERAPIST

## 2021-07-12 PROCEDURE — 97530 THERAPEUTIC ACTIVITIES: CPT | Mod: GP | Performed by: PHYSICAL THERAPIST

## 2021-07-12 PROCEDURE — 97110 THERAPEUTIC EXERCISES: CPT | Mod: GP | Performed by: PHYSICAL THERAPIST

## 2021-07-22 ENCOUNTER — THERAPY VISIT (OUTPATIENT)
Dept: PHYSICAL THERAPY | Facility: CLINIC | Age: 71
End: 2021-07-22
Payer: COMMERCIAL

## 2021-07-22 DIAGNOSIS — M79.18 RIGHT BUTTOCK PAIN: ICD-10-CM

## 2021-07-22 PROCEDURE — 97112 NEUROMUSCULAR REEDUCATION: CPT | Mod: GP | Performed by: PHYSICAL THERAPIST

## 2021-07-22 PROCEDURE — 97110 THERAPEUTIC EXERCISES: CPT | Mod: GP | Performed by: PHYSICAL THERAPIST

## 2021-07-22 PROCEDURE — 97140 MANUAL THERAPY 1/> REGIONS: CPT | Mod: GP | Performed by: PHYSICAL THERAPIST

## 2021-08-02 ENCOUNTER — THERAPY VISIT (OUTPATIENT)
Dept: PHYSICAL THERAPY | Facility: CLINIC | Age: 71
End: 2021-08-02
Payer: COMMERCIAL

## 2021-08-02 DIAGNOSIS — M79.18 RIGHT BUTTOCK PAIN: ICD-10-CM

## 2021-08-02 PROCEDURE — 97110 THERAPEUTIC EXERCISES: CPT | Mod: GP | Performed by: PHYSICAL THERAPIST

## 2021-08-02 PROCEDURE — 97112 NEUROMUSCULAR REEDUCATION: CPT | Mod: GP | Performed by: PHYSICAL THERAPIST

## 2021-08-02 PROCEDURE — 97140 MANUAL THERAPY 1/> REGIONS: CPT | Mod: GP | Performed by: PHYSICAL THERAPIST

## 2021-08-24 ENCOUNTER — THERAPY VISIT (OUTPATIENT)
Dept: PHYSICAL THERAPY | Facility: CLINIC | Age: 71
End: 2021-08-24
Payer: COMMERCIAL

## 2021-08-24 DIAGNOSIS — M79.18 RIGHT BUTTOCK PAIN: ICD-10-CM

## 2021-08-24 PROCEDURE — 97140 MANUAL THERAPY 1/> REGIONS: CPT | Mod: GP | Performed by: PHYSICAL THERAPIST

## 2021-08-24 PROCEDURE — 97530 THERAPEUTIC ACTIVITIES: CPT | Mod: GP | Performed by: PHYSICAL THERAPIST

## 2021-08-24 PROCEDURE — 97110 THERAPEUTIC EXERCISES: CPT | Mod: GP | Performed by: PHYSICAL THERAPIST

## 2021-12-10 PROBLEM — M79.18 RIGHT BUTTOCK PAIN: Status: RESOLVED | Noted: 2021-07-08 | Resolved: 2021-12-10

## 2021-12-10 NOTE — PROGRESS NOTES
DISCHARGE REPORT    Progress reporting period is from 7-8-21 to 8-24-21. Pt completed 5 sessions of PT; pt also received treatment for lymphedema of LE's at another facility.     SUBJECTIVE  Subjective changes noted by patient:  Reported absence of R buttock pain. Back pain ongoing; worse with prolonged standing (10+ minutes) or with prolonged sitting.    Current pain level is variable   Previous pain level was  1/10 (per pt report); varying intensity during walking.   Changes in function:  Goals ongoing at time of last scheduled visit.  Adverse reaction to treatment or activity: activity - walking.    OBJECTIVE  Objective: LROM: flexion: fingertips to ankles, no provocation of sx's; EIS: WFL's, no provocation of sx's. EIL: minimal loss, no provocation of sx's. PROM hips: R: mild groin discomfort with end range flexion and IR; mild groin sx's with IR on the L.     ASSESSMENT/PLAN  Updated problem list and treatment plan: Diagnosis 1:  R buttock pain   STG/LTGs have been met or progress has been made towards goals:  Goals ongoing.  Assessment of Progress: The patient's condition is improving.  Self Management Plans:  Patient has been instructed in a home treatment program.  Helga continues to require the following intervention to meet STG and LTG's:  No further PT scheduled following last visit.    Recommendations:  Discharge.    Please refer to the daily flowsheet for treatment today, total treatment time and time spent performing 1:1 timed codes.

## 2024-07-30 ENCOUNTER — HOSPITAL ENCOUNTER (INPATIENT)
Facility: CLINIC | Age: 74
LOS: 7 days | Discharge: HOME HEALTH | DRG: 486 | End: 2024-08-06
Attending: INTERNAL MEDICINE | Admitting: HOSPITALIST
Payer: COMMERCIAL

## 2024-07-30 DIAGNOSIS — T84.50XA INFECTION OF PROSTHETIC JOINT, INITIAL ENCOUNTER (H): Primary | ICD-10-CM

## 2024-07-30 DIAGNOSIS — S51.001A ELBOW WOUND, RIGHT, INITIAL ENCOUNTER: ICD-10-CM

## 2024-07-30 PROCEDURE — 250N000011 HC RX IP 250 OP 636: Performed by: HOSPITALIST

## 2024-07-30 PROCEDURE — 258N000003 HC RX IP 258 OP 636: Performed by: HOSPITALIST

## 2024-07-30 PROCEDURE — 99223 1ST HOSP IP/OBS HIGH 75: CPT | Performed by: HOSPITALIST

## 2024-07-30 PROCEDURE — 250N000013 HC RX MED GY IP 250 OP 250 PS 637: Performed by: HOSPITALIST

## 2024-07-30 PROCEDURE — 120N000001 HC R&B MED SURG/OB

## 2024-07-30 RX ORDER — RISPERIDONE 1 MG/1
1 TABLET ORAL 3 TIMES DAILY
Status: DISCONTINUED | OUTPATIENT
Start: 2024-07-30 | End: 2024-08-06 | Stop reason: HOSPADM

## 2024-07-30 RX ORDER — TORSEMIDE 20 MG/1
20 TABLET ORAL DAILY
COMMUNITY

## 2024-07-30 RX ORDER — ACETAMINOPHEN 325 MG/1
650 TABLET ORAL EVERY 4 HOURS PRN
Status: DISCONTINUED | OUTPATIENT
Start: 2024-07-30 | End: 2024-07-31

## 2024-07-30 RX ORDER — ONDANSETRON 2 MG/ML
4 INJECTION INTRAMUSCULAR; INTRAVENOUS EVERY 6 HOURS PRN
Status: DISCONTINUED | OUTPATIENT
Start: 2024-07-30 | End: 2024-08-06 | Stop reason: HOSPADM

## 2024-07-30 RX ORDER — OXYCODONE HYDROCHLORIDE 5 MG/1
5 TABLET ORAL EVERY 4 HOURS PRN
Status: DISCONTINUED | OUTPATIENT
Start: 2024-07-30 | End: 2024-08-06 | Stop reason: HOSPADM

## 2024-07-30 RX ORDER — NALOXONE HYDROCHLORIDE 0.4 MG/ML
0.4 INJECTION, SOLUTION INTRAMUSCULAR; INTRAVENOUS; SUBCUTANEOUS
Status: DISCONTINUED | OUTPATIENT
Start: 2024-07-30 | End: 2024-08-06 | Stop reason: HOSPADM

## 2024-07-30 RX ORDER — AMOXICILLIN 250 MG
2 CAPSULE ORAL 2 TIMES DAILY PRN
Status: DISCONTINUED | OUTPATIENT
Start: 2024-07-30 | End: 2024-08-06 | Stop reason: HOSPADM

## 2024-07-30 RX ORDER — ONDANSETRON 4 MG/1
4 TABLET, ORALLY DISINTEGRATING ORAL EVERY 6 HOURS PRN
Status: DISCONTINUED | OUTPATIENT
Start: 2024-07-30 | End: 2024-08-06 | Stop reason: HOSPADM

## 2024-07-30 RX ORDER — EZETIMIBE 10 MG/1
10 TABLET ORAL DAILY
Status: DISCONTINUED | OUTPATIENT
Start: 2024-07-31 | End: 2024-08-06 | Stop reason: HOSPADM

## 2024-07-30 RX ORDER — ASPIRIN 81 MG/1
81 TABLET ORAL DAILY
Status: ON HOLD | COMMUNITY
End: 2024-08-01

## 2024-07-30 RX ORDER — GABAPENTIN 300 MG/1
900 CAPSULE ORAL 3 TIMES DAILY
Status: DISCONTINUED | OUTPATIENT
Start: 2024-07-30 | End: 2024-08-06 | Stop reason: HOSPADM

## 2024-07-30 RX ORDER — NALOXONE HYDROCHLORIDE 0.4 MG/ML
0.2 INJECTION, SOLUTION INTRAMUSCULAR; INTRAVENOUS; SUBCUTANEOUS
Status: DISCONTINUED | OUTPATIENT
Start: 2024-07-30 | End: 2024-08-06 | Stop reason: HOSPADM

## 2024-07-30 RX ORDER — FLUTICASONE FUROATE AND VILANTEROL 100; 25 UG/1; UG/1
1 POWDER RESPIRATORY (INHALATION) DAILY
Status: DISCONTINUED | OUTPATIENT
Start: 2024-07-31 | End: 2024-08-06 | Stop reason: HOSPADM

## 2024-07-30 RX ORDER — ASPIRIN 81 MG/1
81 TABLET ORAL DAILY
Status: DISCONTINUED | OUTPATIENT
Start: 2024-07-31 | End: 2024-07-31

## 2024-07-30 RX ORDER — TORSEMIDE 20 MG/1
20 TABLET ORAL DAILY
Status: DISCONTINUED | OUTPATIENT
Start: 2024-07-31 | End: 2024-08-06 | Stop reason: HOSPADM

## 2024-07-30 RX ORDER — CALCIUM CARBONATE 500 MG/1
1000 TABLET, CHEWABLE ORAL 4 TIMES DAILY PRN
Status: DISCONTINUED | OUTPATIENT
Start: 2024-07-30 | End: 2024-08-06 | Stop reason: HOSPADM

## 2024-07-30 RX ORDER — LIDOCAINE 40 MG/G
CREAM TOPICAL
Status: DISCONTINUED | OUTPATIENT
Start: 2024-07-30 | End: 2024-08-06 | Stop reason: HOSPADM

## 2024-07-30 RX ORDER — AMPICILLIN AND SULBACTAM 2; 1 G/1; G/1
3 INJECTION, POWDER, FOR SOLUTION INTRAMUSCULAR; INTRAVENOUS EVERY 6 HOURS
Status: DISCONTINUED | OUTPATIENT
Start: 2024-07-30 | End: 2024-08-06 | Stop reason: HOSPADM

## 2024-07-30 RX ORDER — FLUOXETINE 40 MG/1
40 CAPSULE ORAL 2 TIMES DAILY
COMMUNITY

## 2024-07-30 RX ORDER — SODIUM CHLORIDE, SODIUM LACTATE, POTASSIUM CHLORIDE, CALCIUM CHLORIDE 600; 310; 30; 20 MG/100ML; MG/100ML; MG/100ML; MG/100ML
INJECTION, SOLUTION INTRAVENOUS CONTINUOUS
Status: ACTIVE | OUTPATIENT
Start: 2024-07-30 | End: 2024-07-31

## 2024-07-30 RX ORDER — PROCHLORPERAZINE 25 MG
12.5 SUPPOSITORY, RECTAL RECTAL EVERY 12 HOURS PRN
Status: DISCONTINUED | OUTPATIENT
Start: 2024-07-30 | End: 2024-08-06 | Stop reason: HOSPADM

## 2024-07-30 RX ORDER — TRAZODONE HYDROCHLORIDE 100 MG/1
100 TABLET ORAL AT BEDTIME
Status: DISCONTINUED | OUTPATIENT
Start: 2024-07-30 | End: 2024-08-06 | Stop reason: HOSPADM

## 2024-07-30 RX ORDER — HYDROMORPHONE HYDROCHLORIDE 1 MG/ML
0.3 INJECTION, SOLUTION INTRAMUSCULAR; INTRAVENOUS; SUBCUTANEOUS
Status: DISCONTINUED | OUTPATIENT
Start: 2024-07-30 | End: 2024-08-06 | Stop reason: HOSPADM

## 2024-07-30 RX ORDER — PROCHLORPERAZINE MALEATE 5 MG
5 TABLET ORAL EVERY 6 HOURS PRN
Status: DISCONTINUED | OUTPATIENT
Start: 2024-07-30 | End: 2024-08-06 | Stop reason: HOSPADM

## 2024-07-30 RX ORDER — AMOXICILLIN 250 MG
1 CAPSULE ORAL 2 TIMES DAILY PRN
Status: DISCONTINUED | OUTPATIENT
Start: 2024-07-30 | End: 2024-08-06 | Stop reason: HOSPADM

## 2024-07-30 RX ORDER — OLMESARTAN MEDOXOMIL AND HYDROCHLOROTHIAZIDE 20/12.5 20; 12.5 MG/1; MG/1
1 TABLET ORAL DAILY
COMMUNITY

## 2024-07-30 RX ORDER — LOSARTAN POTASSIUM AND HYDROCHLOROTHIAZIDE 12.5; 5 MG/1; MG/1
1 TABLET ORAL DAILY
Status: DISCONTINUED | OUTPATIENT
Start: 2024-07-31 | End: 2024-08-06 | Stop reason: HOSPADM

## 2024-07-30 RX ORDER — HYDROMORPHONE HYDROCHLORIDE 1 MG/ML
0.5 INJECTION, SOLUTION INTRAMUSCULAR; INTRAVENOUS; SUBCUTANEOUS
Status: DISCONTINUED | OUTPATIENT
Start: 2024-07-30 | End: 2024-08-06 | Stop reason: HOSPADM

## 2024-07-30 RX ORDER — EZETIMIBE 10 MG/1
10 TABLET ORAL DAILY
COMMUNITY

## 2024-07-30 RX ADMIN — SODIUM CHLORIDE, POTASSIUM CHLORIDE, SODIUM LACTATE AND CALCIUM CHLORIDE: 600; 310; 30; 20 INJECTION, SOLUTION INTRAVENOUS at 23:34

## 2024-07-30 RX ADMIN — TRAZODONE HYDROCHLORIDE 100 MG: 100 TABLET ORAL at 23:48

## 2024-07-30 RX ADMIN — OXYCODONE HYDROCHLORIDE 5 MG: 5 TABLET ORAL at 23:47

## 2024-07-30 RX ADMIN — AMPICILLIN SODIUM AND SULBACTAM SODIUM 3 G: 2; 1 INJECTION, POWDER, FOR SOLUTION INTRAMUSCULAR; INTRAVENOUS at 23:39

## 2024-07-30 RX ADMIN — ACETAMINOPHEN 650 MG: 325 TABLET, FILM COATED ORAL at 23:47

## 2024-07-30 RX ADMIN — GABAPENTIN 900 MG: 300 CAPSULE ORAL at 23:47

## 2024-07-30 RX ADMIN — MICONAZOLE NITRATE: 2 POWDER TOPICAL at 23:49

## 2024-07-30 ASSESSMENT — ACTIVITIES OF DAILY LIVING (ADL)
ADLS_ACUITY_SCORE: 38
ADLS_ACUITY_SCORE: 38

## 2024-07-30 NOTE — PROGRESS NOTES
Transfer Type: [facility]  Transfer Triage Note    Date of call: 07/30/24  Time of call: 11:18 AM    Current Patient Location:  [facility]  Current Level of Care: Med Surg    Vitals: BP:118/59 HR: 82 O2 Sats: 97 between RA and 2L, Afeb  Diagnosis: Septic joint and bacetermia  Reason for requested transfer: Further diagnostic work up, management, and consultation for specialized care   Isolation Needs: None    Care everywhere has been updated and reviewed: Yes  Necessary images have been sent through PACS: No      Transfer accepted: Yes  Stability of Patient: Patient is vitally stable, with no critical labs, and will likely remain stable throughout the transfer process  Level of Care Needed: Med Surg  Telemetry Needed:  None  Expected Time of Arrival for Transfer: 0-8 hours  Arrival Location:  [facility]     Recommendations for Management and Stabilization: Not needed    Additional Comments:    PMH of PMH CKD, HTN    Had a fall and found down - came in with rhabdo and cellulitis of legs, has pain in L knee. Seen by ortho in [place] - 60,000 neutrophils in tapped knee - consistent with septic joint. Blood cultures positive for pasturella. ID following - getting unasyn. (Initially on cefepime and vanc). Ortho recommending transferring patient as her initial arthroplasty surgeon is at [facility].     Mild WBC to 13.5 (previously 15). Hgb 10.7 (near baseline). Elevated trop on admission, now stable at low 200s. No EKG changes. Echo mostly unchanged (mild increase in MS). CK 9000 --> 5000. Creatinine 1.4 (baseline ~1.4 - 1.5)    Accepted to [facility] medicine service in ortho bed. Will need ortho consult and ID.       Susy Bedoya MD

## 2024-07-31 ENCOUNTER — ANESTHESIA (OUTPATIENT)
Dept: SURGERY | Facility: CLINIC | Age: 74
DRG: 486 | End: 2024-07-31
Payer: COMMERCIAL

## 2024-07-31 ENCOUNTER — APPOINTMENT (OUTPATIENT)
Dept: GENERAL RADIOLOGY | Facility: CLINIC | Age: 74
DRG: 486 | End: 2024-07-31
Attending: ORTHOPAEDIC SURGERY
Payer: COMMERCIAL

## 2024-07-31 ENCOUNTER — ANESTHESIA EVENT (OUTPATIENT)
Dept: SURGERY | Facility: CLINIC | Age: 74
DRG: 486 | End: 2024-07-31
Payer: COMMERCIAL

## 2024-07-31 ENCOUNTER — HOME INFUSION (PRE-WILLOW HOME INFUSION) (OUTPATIENT)
Dept: PHARMACY | Facility: CLINIC | Age: 74
End: 2024-07-31
Payer: COMMERCIAL

## 2024-07-31 LAB
ALBUMIN SERPL BCG-MCNC: 2.9 G/DL (ref 3.5–5.2)
ALP SERPL-CCNC: 60 U/L (ref 40–150)
ALT SERPL W P-5'-P-CCNC: 51 U/L (ref 0–50)
ANION GAP SERPL CALCULATED.3IONS-SCNC: 9 MMOL/L (ref 7–15)
AST SERPL W P-5'-P-CCNC: 99 U/L (ref 0–45)
BACTERIA SPEC CULT: NORMAL
BILIRUB SERPL-MCNC: 0.4 MG/DL
BUN SERPL-MCNC: 18.9 MG/DL (ref 8–23)
CALCIUM SERPL-MCNC: 8.6 MG/DL (ref 8.8–10.4)
CHLORIDE SERPL-SCNC: 99 MMOL/L (ref 98–107)
CK SERPL-CCNC: 2435 U/L (ref 26–192)
CREAT SERPL-MCNC: 1.03 MG/DL (ref 0.51–0.95)
CRP SERPL-MCNC: 203.3 MG/L
EGFRCR SERPLBLD CKD-EPI 2021: 57 ML/MIN/1.73M2
ERYTHROCYTE [DISTWIDTH] IN BLOOD BY AUTOMATED COUNT: 11.9 % (ref 10–15)
GLUCOSE SERPL-MCNC: 98 MG/DL (ref 70–99)
GRAM STAIN RESULT: NORMAL
HCO3 SERPL-SCNC: 28 MMOL/L (ref 22–29)
HCT VFR BLD AUTO: 32.3 % (ref 35–47)
HGB BLD-MCNC: 11 G/DL (ref 11.7–15.7)
MCH RBC QN AUTO: 33.7 PG (ref 26.5–33)
MCHC RBC AUTO-ENTMCNC: 34.1 G/DL (ref 31.5–36.5)
MCV RBC AUTO: 99 FL (ref 78–100)
PLATELET # BLD AUTO: 199 10E3/UL (ref 150–450)
POTASSIUM SERPL-SCNC: 4 MMOL/L (ref 3.4–5.3)
PROT SERPL-MCNC: 6.2 G/DL (ref 6.4–8.3)
RBC # BLD AUTO: 3.26 10E6/UL (ref 3.8–5.2)
SODIUM SERPL-SCNC: 136 MMOL/L (ref 135–145)
WBC # BLD AUTO: 7.1 10E3/UL (ref 4–11)

## 2024-07-31 PROCEDURE — 250N000009 HC RX 250: Performed by: NURSE ANESTHETIST, CERTIFIED REGISTERED

## 2024-07-31 PROCEDURE — 258N000003 HC RX IP 258 OP 636: Performed by: ANESTHESIOLOGY

## 2024-07-31 PROCEDURE — 86140 C-REACTIVE PROTEIN: CPT | Performed by: HOSPITALIST

## 2024-07-31 PROCEDURE — 80053 COMPREHEN METABOLIC PANEL: CPT | Performed by: HOSPITALIST

## 2024-07-31 PROCEDURE — 250N000011 HC RX IP 250 OP 636: Performed by: ORTHOPAEDIC SURGERY

## 2024-07-31 PROCEDURE — 27310 EXPLORATION OF KNEE JOINT: CPT | Performed by: ANESTHESIOLOGY

## 2024-07-31 PROCEDURE — 99100 ANES PT EXTEME AGE<1 YR&>70: CPT | Performed by: NURSE ANESTHETIST, CERTIFIED REGISTERED

## 2024-07-31 PROCEDURE — 258N000003 HC RX IP 258 OP 636: Performed by: ORTHOPAEDIC SURGERY

## 2024-07-31 PROCEDURE — 99222 1ST HOSP IP/OBS MODERATE 55: CPT | Performed by: INTERNAL MEDICINE

## 2024-07-31 PROCEDURE — 999N000065 XR KNEE PORT LEFT 1/2 VIEWS: Mod: LT

## 2024-07-31 PROCEDURE — 87070 CULTURE OTHR SPECIMN AEROBIC: CPT | Performed by: ORTHOPAEDIC SURGERY

## 2024-07-31 PROCEDURE — 250N000013 HC RX MED GY IP 250 OP 250 PS 637: Performed by: HOSPITALIST

## 2024-07-31 PROCEDURE — 250N000011 HC RX IP 250 OP 636: Performed by: ANESTHESIOLOGY

## 2024-07-31 PROCEDURE — 0SPD09Z REMOVAL OF LINER FROM LEFT KNEE JOINT, OPEN APPROACH: ICD-10-PCS | Performed by: ORTHOPAEDIC SURGERY

## 2024-07-31 PROCEDURE — 999N000141 HC STATISTIC PRE-PROCEDURE NURSING ASSESSMENT: Performed by: ORTHOPAEDIC SURGERY

## 2024-07-31 PROCEDURE — 250N000013 HC RX MED GY IP 250 OP 250 PS 637: Performed by: ORTHOPAEDIC SURGERY

## 2024-07-31 PROCEDURE — 250N000009 HC RX 250: Performed by: ORTHOPAEDIC SURGERY

## 2024-07-31 PROCEDURE — 250N000011 HC RX IP 250 OP 636: Performed by: NURSE ANESTHETIST, CERTIFIED REGISTERED

## 2024-07-31 PROCEDURE — 87075 CULTR BACTERIA EXCEPT BLOOD: CPT | Performed by: ORTHOPAEDIC SURGERY

## 2024-07-31 PROCEDURE — 87040 BLOOD CULTURE FOR BACTERIA: CPT | Performed by: HOSPITALIST

## 2024-07-31 PROCEDURE — 0SUW09Z SUPPLEMENT LEFT KNEE JOINT, TIBIAL SURFACE WITH LINER, OPEN APPROACH: ICD-10-PCS | Performed by: ORTHOPAEDIC SURGERY

## 2024-07-31 PROCEDURE — 370N000017 HC ANESTHESIA TECHNICAL FEE, PER MIN: Performed by: ORTHOPAEDIC SURGERY

## 2024-07-31 PROCEDURE — 87205 SMEAR GRAM STAIN: CPT | Performed by: ORTHOPAEDIC SURGERY

## 2024-07-31 PROCEDURE — 250N000011 HC RX IP 250 OP 636: Performed by: HOSPITALIST

## 2024-07-31 PROCEDURE — C1776 JOINT DEVICE (IMPLANTABLE): HCPCS | Performed by: ORTHOPAEDIC SURGERY

## 2024-07-31 PROCEDURE — 85027 COMPLETE CBC AUTOMATED: CPT | Performed by: HOSPITALIST

## 2024-07-31 PROCEDURE — G0463 HOSPITAL OUTPT CLINIC VISIT: HCPCS

## 2024-07-31 PROCEDURE — 710N000009 HC RECOVERY PHASE 1, LEVEL 1, PER MIN: Performed by: ORTHOPAEDIC SURGERY

## 2024-07-31 PROCEDURE — 82550 ASSAY OF CK (CPK): CPT | Performed by: HOSPITALIST

## 2024-07-31 PROCEDURE — 258N000001 HC RX 258: Performed by: ORTHOPAEDIC SURGERY

## 2024-07-31 PROCEDURE — 27310 EXPLORATION OF KNEE JOINT: CPT | Performed by: NURSE ANESTHETIST, CERTIFIED REGISTERED

## 2024-07-31 PROCEDURE — 36415 COLL VENOUS BLD VENIPUNCTURE: CPT | Performed by: HOSPITALIST

## 2024-07-31 PROCEDURE — 120N000001 HC R&B MED SURG/OB

## 2024-07-31 PROCEDURE — 99232 SBSQ HOSP IP/OBS MODERATE 35: CPT | Performed by: INTERNAL MEDICINE

## 2024-07-31 PROCEDURE — 271N000001 HC OR GENERAL SUPPLY NON-STERILE: Performed by: ORTHOPAEDIC SURGERY

## 2024-07-31 PROCEDURE — 360N000076 HC SURGERY LEVEL 3, PER MIN: Performed by: ORTHOPAEDIC SURGERY

## 2024-07-31 PROCEDURE — 250N000025 HC SEVOFLURANE, PER MIN: Performed by: ORTHOPAEDIC SURGERY

## 2024-07-31 PROCEDURE — 272N000001 HC OR GENERAL SUPPLY STERILE: Performed by: ORTHOPAEDIC SURGERY

## 2024-07-31 DEVICE — IMPLANTABLE DEVICE
Type: IMPLANTABLE DEVICE | Site: KNEE | Status: FUNCTIONAL
Brand: BIOMET® KNEE SYSTEM

## 2024-07-31 DEVICE — IMPLANTABLE DEVICE
Type: IMPLANTABLE DEVICE | Site: KNEE | Status: FUNCTIONAL
Brand: VANGUARD® KNEE SYSTEM

## 2024-07-31 RX ORDER — CEFAZOLIN SODIUM/WATER 2 G/20 ML
2 SYRINGE (ML) INTRAVENOUS
Status: COMPLETED | OUTPATIENT
Start: 2024-07-31 | End: 2024-07-31

## 2024-07-31 RX ORDER — ONDANSETRON 2 MG/ML
4 INJECTION INTRAMUSCULAR; INTRAVENOUS EVERY 30 MIN PRN
Status: DISCONTINUED | OUTPATIENT
Start: 2024-07-31 | End: 2024-07-31 | Stop reason: HOSPADM

## 2024-07-31 RX ORDER — ONDANSETRON 4 MG/1
4 TABLET, ORALLY DISINTEGRATING ORAL EVERY 6 HOURS PRN
Status: DISCONTINUED | OUTPATIENT
Start: 2024-07-31 | End: 2024-07-31

## 2024-07-31 RX ORDER — HYDROMORPHONE HCL IN WATER/PF 6 MG/30 ML
0.4 PATIENT CONTROLLED ANALGESIA SYRINGE INTRAVENOUS
Status: DISCONTINUED | OUTPATIENT
Start: 2024-07-31 | End: 2024-07-31

## 2024-07-31 RX ORDER — KETOROLAC TROMETHAMINE 15 MG/ML
15 INJECTION, SOLUTION INTRAMUSCULAR; INTRAVENOUS EVERY 6 HOURS
Status: COMPLETED | OUTPATIENT
Start: 2024-07-31 | End: 2024-08-01

## 2024-07-31 RX ORDER — ONDANSETRON 2 MG/ML
INJECTION INTRAMUSCULAR; INTRAVENOUS PRN
Status: DISCONTINUED | OUTPATIENT
Start: 2024-07-31 | End: 2024-07-31

## 2024-07-31 RX ORDER — PROCHLORPERAZINE MALEATE 5 MG
5 TABLET ORAL EVERY 6 HOURS PRN
Status: DISCONTINUED | OUTPATIENT
Start: 2024-07-31 | End: 2024-07-31

## 2024-07-31 RX ORDER — ONDANSETRON 2 MG/ML
4 INJECTION INTRAMUSCULAR; INTRAVENOUS EVERY 6 HOURS PRN
Status: DISCONTINUED | OUTPATIENT
Start: 2024-07-31 | End: 2024-07-31

## 2024-07-31 RX ORDER — CEFAZOLIN SODIUM 2 G/100ML
2 INJECTION, SOLUTION INTRAVENOUS EVERY 8 HOURS
Status: COMPLETED | OUTPATIENT
Start: 2024-07-31 | End: 2024-08-01

## 2024-07-31 RX ORDER — SODIUM CHLORIDE, SODIUM LACTATE, POTASSIUM CHLORIDE, CALCIUM CHLORIDE 600; 310; 30; 20 MG/100ML; MG/100ML; MG/100ML; MG/100ML
INJECTION, SOLUTION INTRAVENOUS CONTINUOUS
Status: DISCONTINUED | OUTPATIENT
Start: 2024-07-31 | End: 2024-07-31 | Stop reason: HOSPADM

## 2024-07-31 RX ORDER — MAGNESIUM HYDROXIDE 1200 MG/15ML
LIQUID ORAL PRN
Status: DISCONTINUED | OUTPATIENT
Start: 2024-07-31 | End: 2024-07-31 | Stop reason: HOSPADM

## 2024-07-31 RX ORDER — BISACODYL 10 MG
10 SUPPOSITORY, RECTAL RECTAL DAILY PRN
Status: DISCONTINUED | OUTPATIENT
Start: 2024-08-03 | End: 2024-08-06 | Stop reason: HOSPADM

## 2024-07-31 RX ORDER — HYDROMORPHONE HCL IN WATER/PF 6 MG/30 ML
0.4 PATIENT CONTROLLED ANALGESIA SYRINGE INTRAVENOUS EVERY 5 MIN PRN
Status: DISCONTINUED | OUTPATIENT
Start: 2024-07-31 | End: 2024-07-31 | Stop reason: HOSPADM

## 2024-07-31 RX ORDER — HYDROMORPHONE HYDROCHLORIDE 2 MG/1
2 TABLET ORAL EVERY 4 HOURS PRN
Status: DISCONTINUED | OUTPATIENT
Start: 2024-07-31 | End: 2024-08-06 | Stop reason: HOSPADM

## 2024-07-31 RX ORDER — ACYCLOVIR 200 MG/1
CAPSULE ORAL PRN
Status: DISCONTINUED | OUTPATIENT
Start: 2024-07-31 | End: 2024-07-31 | Stop reason: HOSPADM

## 2024-07-31 RX ORDER — PROPOFOL 10 MG/ML
INJECTION, EMULSION INTRAVENOUS PRN
Status: DISCONTINUED | OUTPATIENT
Start: 2024-07-31 | End: 2024-07-31

## 2024-07-31 RX ORDER — CEFAZOLIN SODIUM/WATER 2 G/20 ML
2 SYRINGE (ML) INTRAVENOUS SEE ADMIN INSTRUCTIONS
Status: DISCONTINUED | OUTPATIENT
Start: 2024-07-31 | End: 2024-07-31 | Stop reason: HOSPADM

## 2024-07-31 RX ORDER — HYDROMORPHONE HCL IN WATER/PF 6 MG/30 ML
0.2 PATIENT CONTROLLED ANALGESIA SYRINGE INTRAVENOUS EVERY 5 MIN PRN
Status: DISCONTINUED | OUTPATIENT
Start: 2024-07-31 | End: 2024-07-31 | Stop reason: HOSPADM

## 2024-07-31 RX ORDER — SODIUM CHLORIDE, SODIUM LACTATE, POTASSIUM CHLORIDE, CALCIUM CHLORIDE 600; 310; 30; 20 MG/100ML; MG/100ML; MG/100ML; MG/100ML
INJECTION, SOLUTION INTRAVENOUS CONTINUOUS
Status: DISCONTINUED | OUTPATIENT
Start: 2024-07-31 | End: 2024-08-03

## 2024-07-31 RX ORDER — ACETAMINOPHEN 325 MG/1
650 TABLET ORAL EVERY 4 HOURS PRN
Status: DISCONTINUED | OUTPATIENT
Start: 2024-08-03 | End: 2024-08-06 | Stop reason: HOSPADM

## 2024-07-31 RX ORDER — ONDANSETRON 4 MG/1
4 TABLET, ORALLY DISINTEGRATING ORAL EVERY 30 MIN PRN
Status: DISCONTINUED | OUTPATIENT
Start: 2024-07-31 | End: 2024-07-31 | Stop reason: HOSPADM

## 2024-07-31 RX ORDER — METHOCARBAMOL 500 MG/1
500 TABLET, FILM COATED ORAL EVERY 6 HOURS PRN
Status: DISCONTINUED | OUTPATIENT
Start: 2024-07-31 | End: 2024-08-06 | Stop reason: HOSPADM

## 2024-07-31 RX ORDER — DEXAMETHASONE SODIUM PHOSPHATE 4 MG/ML
INJECTION, SOLUTION INTRA-ARTICULAR; INTRALESIONAL; INTRAMUSCULAR; INTRAVENOUS; SOFT TISSUE PRN
Status: DISCONTINUED | OUTPATIENT
Start: 2024-07-31 | End: 2024-07-31

## 2024-07-31 RX ORDER — ASPIRIN 81 MG/1
81 TABLET ORAL 2 TIMES DAILY
Status: DISCONTINUED | OUTPATIENT
Start: 2024-07-31 | End: 2024-08-06 | Stop reason: HOSPADM

## 2024-07-31 RX ORDER — POLYETHYLENE GLYCOL 3350 17 G/17G
17 POWDER, FOR SOLUTION ORAL DAILY
Status: DISCONTINUED | OUTPATIENT
Start: 2024-08-01 | End: 2024-08-06 | Stop reason: HOSPADM

## 2024-07-31 RX ORDER — HYDROMORPHONE HCL IN WATER/PF 6 MG/30 ML
0.2 PATIENT CONTROLLED ANALGESIA SYRINGE INTRAVENOUS
Status: DISCONTINUED | OUTPATIENT
Start: 2024-07-31 | End: 2024-07-31

## 2024-07-31 RX ORDER — AMOXICILLIN 250 MG
1 CAPSULE ORAL 2 TIMES DAILY
Status: DISCONTINUED | OUTPATIENT
Start: 2024-07-31 | End: 2024-08-06 | Stop reason: HOSPADM

## 2024-07-31 RX ORDER — HYDROMORPHONE HYDROCHLORIDE 2 MG/1
4 TABLET ORAL EVERY 4 HOURS PRN
Status: DISCONTINUED | OUTPATIENT
Start: 2024-07-31 | End: 2024-08-06 | Stop reason: HOSPADM

## 2024-07-31 RX ORDER — NALOXONE HYDROCHLORIDE 0.4 MG/ML
0.1 INJECTION, SOLUTION INTRAMUSCULAR; INTRAVENOUS; SUBCUTANEOUS
Status: DISCONTINUED | OUTPATIENT
Start: 2024-07-31 | End: 2024-07-31 | Stop reason: HOSPADM

## 2024-07-31 RX ORDER — TRANEXAMIC ACID 10 MG/ML
1 INJECTION, SOLUTION INTRAVENOUS ONCE
Status: COMPLETED | OUTPATIENT
Start: 2024-07-31 | End: 2024-07-31

## 2024-07-31 RX ORDER — FENTANYL CITRATE 50 UG/ML
INJECTION, SOLUTION INTRAMUSCULAR; INTRAVENOUS PRN
Status: DISCONTINUED | OUTPATIENT
Start: 2024-07-31 | End: 2024-07-31

## 2024-07-31 RX ORDER — ACETAMINOPHEN 325 MG/1
975 TABLET ORAL EVERY 8 HOURS
Status: COMPLETED | OUTPATIENT
Start: 2024-07-31 | End: 2024-08-03

## 2024-07-31 RX ORDER — TRANEXAMIC ACID 10 MG/ML
1 INJECTION, SOLUTION INTRAVENOUS ONCE
Status: DISCONTINUED | OUTPATIENT
Start: 2024-07-31 | End: 2024-07-31 | Stop reason: HOSPADM

## 2024-07-31 RX ORDER — FENTANYL CITRATE 0.05 MG/ML
50 INJECTION, SOLUTION INTRAMUSCULAR; INTRAVENOUS EVERY 5 MIN PRN
Status: DISCONTINUED | OUTPATIENT
Start: 2024-07-31 | End: 2024-07-31 | Stop reason: HOSPADM

## 2024-07-31 RX ORDER — LIDOCAINE HYDROCHLORIDE 20 MG/ML
INJECTION, SOLUTION INFILTRATION; PERINEURAL PRN
Status: DISCONTINUED | OUTPATIENT
Start: 2024-07-31 | End: 2024-07-31

## 2024-07-31 RX ORDER — FENTANYL CITRATE 0.05 MG/ML
25 INJECTION, SOLUTION INTRAMUSCULAR; INTRAVENOUS EVERY 5 MIN PRN
Status: DISCONTINUED | OUTPATIENT
Start: 2024-07-31 | End: 2024-07-31 | Stop reason: HOSPADM

## 2024-07-31 RX ORDER — AMPICILLIN AND SULBACTAM 1; .5 G/1; G/1
INJECTION, POWDER, FOR SOLUTION INTRAMUSCULAR; INTRAVENOUS PRN
Status: DISCONTINUED | OUTPATIENT
Start: 2024-07-31 | End: 2024-07-31

## 2024-07-31 RX ORDER — LIDOCAINE 40 MG/G
CREAM TOPICAL
Status: DISCONTINUED | OUTPATIENT
Start: 2024-07-31 | End: 2024-08-06 | Stop reason: HOSPADM

## 2024-07-31 RX ORDER — DEXAMETHASONE SODIUM PHOSPHATE 4 MG/ML
4 INJECTION, SOLUTION INTRA-ARTICULAR; INTRALESIONAL; INTRAMUSCULAR; INTRAVENOUS; SOFT TISSUE
Status: DISCONTINUED | OUTPATIENT
Start: 2024-07-31 | End: 2024-07-31 | Stop reason: HOSPADM

## 2024-07-31 RX ADMIN — SODIUM CHLORIDE, POTASSIUM CHLORIDE, SODIUM LACTATE AND CALCIUM CHLORIDE: 600; 310; 30; 20 INJECTION, SOLUTION INTRAVENOUS at 22:50

## 2024-07-31 RX ADMIN — AMPICILLIN SODIUM AND SULBACTAM SODIUM 3 G: 1; .5 INJECTION, POWDER, FOR SOLUTION INTRAMUSCULAR; INTRAVENOUS at 17:40

## 2024-07-31 RX ADMIN — PROPOFOL 120 MG: 10 INJECTION, EMULSION INTRAVENOUS at 16:02

## 2024-07-31 RX ADMIN — TRANEXAMIC ACID 1 G: 10 INJECTION, SOLUTION INTRAVENOUS at 17:33

## 2024-07-31 RX ADMIN — DEXAMETHASONE SODIUM PHOSPHATE 4 MG: 4 INJECTION, SOLUTION INTRA-ARTICULAR; INTRALESIONAL; INTRAMUSCULAR; INTRAVENOUS; SOFT TISSUE at 16:02

## 2024-07-31 RX ADMIN — MIDAZOLAM 2 MG: 1 INJECTION INTRAMUSCULAR; INTRAVENOUS at 15:59

## 2024-07-31 RX ADMIN — EZETIMIBE 10 MG: 10 TABLET ORAL at 08:26

## 2024-07-31 RX ADMIN — GABAPENTIN 900 MG: 300 CAPSULE ORAL at 08:25

## 2024-07-31 RX ADMIN — FENTANYL CITRATE 50 MCG: 50 INJECTION, SOLUTION INTRAMUSCULAR; INTRAVENOUS at 19:46

## 2024-07-31 RX ADMIN — SUGAMMADEX 250 MG: 100 INJECTION, SOLUTION INTRAVENOUS at 18:41

## 2024-07-31 RX ADMIN — RISPERIDONE 1 MG: 1 TABLET ORAL at 08:27

## 2024-07-31 RX ADMIN — ONDANSETRON 4 MG: 2 INJECTION INTRAMUSCULAR; INTRAVENOUS at 17:42

## 2024-07-31 RX ADMIN — FLUOXETINE HYDROCHLORIDE 40 MG: 20 CAPSULE ORAL at 08:33

## 2024-07-31 RX ADMIN — ACETAMINOPHEN 650 MG: 325 TABLET, FILM COATED ORAL at 08:27

## 2024-07-31 RX ADMIN — ACETAMINOPHEN 975 MG: 325 TABLET, FILM COATED ORAL at 22:47

## 2024-07-31 RX ADMIN — LOSARTAN POTASSIUM AND HYDROCHLOROTHIAZIDE 1 TABLET: 50; 12.5 TABLET, FILM COATED ORAL at 08:27

## 2024-07-31 RX ADMIN — FLUTICASONE FUROATE AND VILANTEROL 1 PUFF: 100; 25 POWDER RESPIRATORY (INHALATION) at 08:30

## 2024-07-31 RX ADMIN — OXYCODONE HYDROCHLORIDE 5 MG: 5 TABLET ORAL at 08:26

## 2024-07-31 RX ADMIN — CEFAZOLIN SODIUM 2 G: 2 INJECTION, SOLUTION INTRAVENOUS at 23:47

## 2024-07-31 RX ADMIN — KETOROLAC TROMETHAMINE 15 MG: 15 INJECTION, SOLUTION INTRAMUSCULAR; INTRAVENOUS at 22:49

## 2024-07-31 RX ADMIN — AMPICILLIN SODIUM AND SULBACTAM SODIUM 3 G: 2; 1 INJECTION, POWDER, FOR SOLUTION INTRAMUSCULAR; INTRAVENOUS at 22:50

## 2024-07-31 RX ADMIN — SODIUM CHLORIDE, POTASSIUM CHLORIDE, SODIUM LACTATE AND CALCIUM CHLORIDE: 600; 310; 30; 20 INJECTION, SOLUTION INTRAVENOUS at 18:35

## 2024-07-31 RX ADMIN — TRAZODONE HYDROCHLORIDE 100 MG: 100 TABLET ORAL at 23:46

## 2024-07-31 RX ADMIN — RISPERIDONE 1 MG: 1 TABLET ORAL at 22:48

## 2024-07-31 RX ADMIN — FENTANYL CITRATE 100 MCG: 50 INJECTION INTRAMUSCULAR; INTRAVENOUS at 16:02

## 2024-07-31 RX ADMIN — ROCURONIUM BROMIDE 40 MG: 50 INJECTION, SOLUTION INTRAVENOUS at 16:02

## 2024-07-31 RX ADMIN — RISPERIDONE 1 MG: 1 TABLET ORAL at 00:03

## 2024-07-31 RX ADMIN — GABAPENTIN 900 MG: 300 CAPSULE ORAL at 22:48

## 2024-07-31 RX ADMIN — SENNOSIDES AND DOCUSATE SODIUM 1 TABLET: 8.6; 5 TABLET ORAL at 23:46

## 2024-07-31 RX ADMIN — AMPICILLIN SODIUM AND SULBACTAM SODIUM 3 G: 2; 1 INJECTION, POWDER, FOR SOLUTION INTRAMUSCULAR; INTRAVENOUS at 11:11

## 2024-07-31 RX ADMIN — HYDROMORPHONE HYDROCHLORIDE 0.3 MG: 1 INJECTION, SOLUTION INTRAMUSCULAR; INTRAVENOUS; SUBCUTANEOUS at 11:38

## 2024-07-31 RX ADMIN — MICONAZOLE NITRATE: 2 POWDER TOPICAL at 08:29

## 2024-07-31 RX ADMIN — LIDOCAINE HYDROCHLORIDE 100 MG: 20 INJECTION, SOLUTION INFILTRATION; PERINEURAL at 16:02

## 2024-07-31 RX ADMIN — ASPIRIN 81 MG: 81 TABLET, COATED ORAL at 23:47

## 2024-07-31 RX ADMIN — HYDROMORPHONE HYDROCHLORIDE 0.2 MG: 0.2 INJECTION, SOLUTION INTRAMUSCULAR; INTRAVENOUS; SUBCUTANEOUS at 20:54

## 2024-07-31 RX ADMIN — TRANEXAMIC ACID 1 G: 10 INJECTION, SOLUTION INTRAVENOUS at 15:59

## 2024-07-31 RX ADMIN — SODIUM CHLORIDE, POTASSIUM CHLORIDE, SODIUM LACTATE AND CALCIUM CHLORIDE: 600; 310; 30; 20 INJECTION, SOLUTION INTRAVENOUS at 15:24

## 2024-07-31 RX ADMIN — Medication 2 G: at 15:57

## 2024-07-31 RX ADMIN — HYDROMORPHONE HYDROCHLORIDE 0.3 MG: 1 INJECTION, SOLUTION INTRAMUSCULAR; INTRAVENOUS; SUBCUTANEOUS at 14:01

## 2024-07-31 RX ADMIN — AMPICILLIN SODIUM AND SULBACTAM SODIUM 3 G: 2; 1 INJECTION, POWDER, FOR SOLUTION INTRAMUSCULAR; INTRAVENOUS at 05:40

## 2024-07-31 RX ADMIN — PROPOFOL 30 MG: 10 INJECTION, EMULSION INTRAVENOUS at 18:01

## 2024-07-31 ASSESSMENT — ACTIVITIES OF DAILY LIVING (ADL)
ADLS_ACUITY_SCORE: 38
ADLS_ACUITY_SCORE: 32
ADLS_ACUITY_SCORE: 38
ADLS_ACUITY_SCORE: 38
ADLS_ACUITY_SCORE: 32
ADLS_ACUITY_SCORE: 38
ADLS_ACUITY_SCORE: 38
CHANGE_IN_FUNCTIONAL_STATUS_SINCE_ONSET_OF_CURRENT_ILLNESS/INJURY: NO
ADLS_ACUITY_SCORE: 38
ADLS_ACUITY_SCORE: 34
ADLS_ACUITY_SCORE: 38
ADLS_ACUITY_SCORE: 32
DRESSING/BATHING_DIFFICULTY: NO
EQUIPMENT_CURRENTLY_USED_AT_HOME: CANE, STRAIGHT
TOILETING_ISSUES: NO
ADLS_ACUITY_SCORE: 38
DOING_ERRANDS_INDEPENDENTLY_DIFFICULTY: NO
ADLS_ACUITY_SCORE: 34
ADLS_ACUITY_SCORE: 38
ADLS_ACUITY_SCORE: 34
ADLS_ACUITY_SCORE: 38
ADLS_ACUITY_SCORE: 34
ADLS_ACUITY_SCORE: 32
ADLS_ACUITY_SCORE: 34
ADLS_ACUITY_SCORE: 38
ADLS_ACUITY_SCORE: 34
ADLS_ACUITY_SCORE: 32
ADLS_ACUITY_SCORE: 34

## 2024-07-31 ASSESSMENT — COPD QUESTIONNAIRES: COPD: 1

## 2024-07-31 ASSESSMENT — LIFESTYLE VARIABLES: TOBACCO_USE: 1

## 2024-07-31 NOTE — ANESTHESIA PROCEDURE NOTES
Airway       Patient location during procedure: OR       Procedure Start/Stop Times: 7/31/2024 4:04 PM  Staff -        CRNA: Shola Black APRN CRNA       Performed By: CRNA  Consent for Airway        Urgency: elective  Indications and Patient Condition       Indications for airway management: sabrina-procedural       Induction type:intravenous       Mask difficulty assessment: 2 - vent by mask + OA or adjuvant +/- NMBA    Final Airway Details       Final airway type: endotracheal airway       Successful airway: ETT - single  Endotracheal Airway Details        ETT size (mm): 7.0       Cuffed: yes       Successful intubation technique: video laryngoscopy       VL Blade Size: Glidescope 4       Grade View of Cords: 1       Adjucts: stylet       Bite block used: None    Post intubation assessment        Placement verified by: capnometry, equal breath sounds and chest rise        Number of attempts at approach: 1       Secured with: tape       Ease of procedure: easy       Dentition: Intact and Unchanged    Medication(s) Administered   Medication Administration Time: 7/31/2024 4:04 PM

## 2024-07-31 NOTE — PHARMACY-ADMISSION MEDICATION HISTORY
Pharmacist Admission Medication History    Admission medication history is complete. The information provided in this note is only as accurate as the sources available at the time of the update.    Information Source(s): Patient, Hospital records, and CareEverywhere/SureScripts via in-person    Pertinent Information: Patient received Breo Ellipta and Incruse from [place] which are included in their home medications.    Changes made to PTA medication list:  Added: Ezetimibe, Olmesartan/hydrochlorothiazide, torsemide  Deleted: Calcium/Vitamin D/Vitamin K, Furosemide, Hydroxyzine, Ibuprofen, losartan/hydrochlorothiazide, multivitamin, ondansetron, oxycodone, potassium chloride, senna-docusate, varenicline  Changed: Fluoxetine 20 mg qam and 60 mg qpm to 40 mg BID, Gabapentin 900mg BID to TID, Risperidone 1mg BID to TID    Allergies reviewed with patient and updates made in EHR: yes    Medication History Completed By: Justo Pang RPH 7/30/2024 10:04 PM    PTA Med List   Medication Sig Last Dose    acetaminophen (TYLENOL) 325 MG tablet Take 2 tablets (650 mg) by mouth every 4 hours as needed for other (mild pain) 7/30/2024 at am    albuterol (PROAIR HFA/PROVENTIL HFA/VENTOLIN HFA) 108 (90 Base) MCG/ACT inhaler Inhale 2 puffs into the lungs daily as needed for shortness of breath / dyspnea or wheezing 7/27/2024    aspirin 81 MG EC tablet Take 81 mg by mouth daily 7/29/2024    budesonide-formoterol (SYMBICORT) 80-4.5 MCG/ACT Inhaler Inhale 2 puffs into the lungs 2 times daily 7/30/2024 at am    ezetimibe (ZETIA) 10 MG tablet Take 10 mg by mouth daily 7/30/2024 at am    FLUoxetine (PROZAC) 40 MG capsule Take 40 mg by mouth 2 times daily 7/30/2024 at am    gabapentin (NEURONTIN) 300 MG capsule Take 900 mg by mouth 3 times daily (Take 3 X 300 mg = 900 mg dose) 7/30/2024 at am    olmesartan-hydrochlorothiazide (BENICAR HCT) 20-12.5 MG tablet Take 1 tablet by mouth daily 7/30/2024 at am    risperiDONE (RISPERDAL) 1 MG  tablet Take 1 mg by mouth 3 times daily 7/30/2024 at am    tiotropium (SPIRIVA RESPIMAT) 2.5 MCG/ACT inhaler Inhale 2 puffs into the lungs every morning  7/30/2024 at am    torsemide (DEMADEX) 20 MG tablet Take 20 mg by mouth daily 7/30/2024 at am    traZODone (DESYREL) 100 MG tablet Take 100 mg by mouth At Bedtime  7/29/2024 at pm

## 2024-07-31 NOTE — H&P
[facility]    History and Physical - Hospitalist Service       Date of Admission:  7/30/2024    Assessment & Plan      Helga Geronimo is a 74 year old female with a history of hypertension, hyperlipidemia, chronic kidney disease, COPD, depression, anxiety, bilateral lower extremity lymphedema, and morbid obesity who transferred from [facility] to [facility] on 07/30/2024 due to bacteremia and left TKA prosthetic joint infection.    Pasteurella bacteremia  Left TKA prosthetic joint infection  Left lower extremity cellulitis  Bilateral lower extremity lymphedema  Patient slipped and fell prior to admission. Developed left knee pain after the fall. Had erythema of left knee/leg at time of admission. History of bilateral lower extremity lymphedema and wounds. Blood cultures from [place] [facility] positive for Pasteurella. S/p left knee arthrocentesis - culture pending, cell count with 68,000 nucleated cells (94% neutrophils). Transferred from [facility] to [facility] on 7/30/24 for further evaluation and management by Dr. Yu, who is the patient's primary Orthopedic Surgeon that performed her initial left TKA in August 2020.  Admit to inpatient.  Orthopedic surgery consult, appreciate their assistance.  Infectious Disease consult, appreciate their assistance.  Regular diet for now, then NPO after midnight.  ID was following at previous facility, recommended Unasyn for treatment of Pasteurella bacteremia and prosthetic joint infection. Will continue Unasyn 3 g every 6 hours.  Will need PT/OT consults, will wait until after she is seen by Orthopedic Surgery and plan of care is determined.  Care transitions consult regarding discharge planning.  WOC nurse consulted regarding chronic lower extremity wounds, right elbow wound, and left cheek wound.    Rhabdomyolysis  Fall with prolonged time on floor  Secondary to fall and laying on the floor overnight.  CK  initially up to 9,563, then trending down, 5,334 on 7/30/24.  Continue IV fluids.  Recheck CK in AM.    Hypertension  Blood pressure mildly elevated.  Continue PTA olmesartan-hydrochlorothiazide.  Hold PTA torsemide for now, reassess tomorrow pending surgical plans.    Hyperlipidemia  Continue PTA ezetimibe.  Prior history of significant myalgias with statins.    Chronic kidney disease, stage 3  Creatinine stable at baseline in the ER at 1.53 on 7/28/2024.  Creatinine stable at 1.41 on 7/29/2024.  Recheck labs in AM.  Avoid nephrotoxic medications as able.    COPD  Denies shortness of breath, wheezing.  Continue PTA Symbicort or formulary equivalent.  Continue PTA Spiriva.  Can resume PTA as needed albuterol inhaler if needed.    Depression  Anxiety  Continue PTA fluoxetine, risperdal, and trazodone.    Morbid obesity  BMI 43.89 kg/m2 per outside records.  Increases all cause mortality.  Follow up with PCP regarding long term management.          Diet: NPO per Anesthesia Guidelines for Procedure/Surgery Except for: Meds  Combination Diet Regular Diet Adult    DVT Prophylaxis: Pneumatic Compression Devices  Fuchs Catheter: Not present  Lines: None     Cardiac Monitoring: None  Code Status: Full Code      Clinically Significant Risk Factors Present on Admission                # Drug Induced Platelet Defect: home medication list includes an antiplatelet medication   # Hypertension: Home medication list includes antihypertensive(s)                    Disposition Plan     Medically Ready for Discharge: Anticipated in 2-4 Days           Ben De Oliveira MD  Hospitalist Service  [facility]  Securely message with myEDmatch (more info)  Text page via Ch[employer] Paging/Directory     ______________________________________________________________________    Chief Complaint   Bacteremia, prosthetic joint infection    History is obtained from the patient    History of Present Illness   Helga Geronimo is a 74  year old female with a history of hypertension, hyperlipidemia, chronic kidney disease, COPD, depression, anxiety, bilateral lower extremity lymphedema, and morbid obesity who has been transferred from [facility] to [facility] due to bacteremia and left TKA prosthetic joint infection.  She was trying to feed her cat on 7/27/2024 and she fell forward and injured herself.  She was not able to get up.  She states she laid on the floor from about 10 PM until 7 the following morning.  She states someone called EMS to check on her.  She was evaluated by EMS and brought into the [facility] for evaluation.  She was subsequently admitted to the [facility] for ongoing care.  Blood cultures from the time of admission returned positive for Pasteurella.  She had some left lower extremity erythema and significant pain with movement of her left knee.  Orthopedic surgery was consulted and performed arthrocentesis, culture pending, cell count concerning for septic arthritis.  The patient has a prior history of a left TKA, her original surgeon was Dr. Yu with [facility].  She was transferred from [facility] to [facility] for further evaluation and management by Dr. Yu.    She currently has pain in her left knee.  Pain is about a 6 out of 10 at rest, 9 or 10 out of 10 with activity.  Denies fevers, chills, sweats, chest pain, shortness of breath, nausea, abdominal pain, diarrhea, urinary symptoms.  She normally ambulates independently in her house and uses a cane when she goes to extended distances outside of her house.  She lives independently.  She has a cat.  Denies any recent cat bites or scratches.    Past Medical History    Past Medical History:   Diagnosis Date    Anxiety     Anxiety     Arthritis     severe degenerative-low back    Back pain, chronic     Bilateral lower extremity edema     Chronic seasonal allergic rhinitis     COPD (chronic obstructive pulmonary  disease) (H)     Depression     Depressive disorder     Hypertension     Intermittent asthma     Iron deficiency anemia     Morbid obesity (H)     OA (osteoarthritis)     Statin myopathy     Tobacco use disorder     Tubular adenoma     Vitamin B12 deficiency      Past Surgical History   Past Surgical History:   Procedure Laterality Date    ARTHROPLASTY KNEE Left 8/26/2020    Procedure: LEFT TOTAL KNEE ARTHROPLASTY;  Surgeon: Shola Yu MD;  Location: [facility] OR    ARTHROPLASTY SHOULDER Left 11/20/2019    Procedure: LEFT TOTAL SHOULDER ARTHROPLASTY;  Surgeon: Shola Yu MD;  Location: [facility] OR    bilat hip replacements       EYE SURGERY      GYN SURGERY      HYSTERECTOMY      JOINT REPLACEMENT       Prior to Admission Medications   Prior to Admission Medications   Prescriptions Last Dose Informant Patient Reported? Taking?   FLUoxetine (PROZAC) 40 MG capsule 7/30/2024 at am  Yes Yes   Sig: Take 40 mg by mouth 2 times daily   acetaminophen (TYLENOL) 325 MG tablet 7/30/2024 at am Self No Yes   Sig: Take 2 tablets (650 mg) by mouth every 4 hours as needed for other (mild pain)   albuterol (PROAIR HFA/PROVENTIL HFA/VENTOLIN HFA) 108 (90 Base) MCG/ACT inhaler 7/27/2024 Self Yes Yes   Sig: Inhale 2 puffs into the lungs daily as needed for shortness of breath / dyspnea or wheezing   amoxicillin (AMOXIL) 500 MG capsule  Self Yes No   Sig: Take 2,000 mg by mouth daily as needed (1 hour prior to dental work)   aspirin 81 MG EC tablet 7/29/2024  Yes Yes   Sig: Take 81 mg by mouth daily   budesonide-formoterol (SYMBICORT) 80-4.5 MCG/ACT Inhaler 7/30/2024 at am Self Yes Yes   Sig: Inhale 2 puffs into the lungs 2 times daily   ezetimibe (ZETIA) 10 MG tablet 7/30/2024 at am  Yes Yes   Sig: Take 10 mg by mouth daily   gabapentin (NEURONTIN) 300 MG capsule 7/30/2024 at am Self Yes Yes   Sig: Take 900 mg by mouth 3 times daily (Take 3 X 300 mg = 900 mg dose)   olmesartan-hydrochlorothiazide (BENICAR HCT) 20-12.5 MG tablet  2024 at am  Yes Yes   Sig: Take 1 tablet by mouth daily   risperiDONE (RISPERDAL) 1 MG tablet 2024 at am Self Yes Yes   Sig: Take 1 mg by mouth 3 times daily   tiotropium (SPIRIVA RESPIMAT) 2.5 MCG/ACT inhaler 2024 at am Self Yes Yes   Sig: Inhale 2 puffs into the lungs every morning    torsemide (DEMADEX) 20 MG tablet 2024 at am  Yes Yes   Sig: Take 20 mg by mouth daily   traZODone (DESYREL) 100 MG tablet 2024 at pm Self Yes Yes   Sig: Take 100 mg by mouth At Bedtime       Facility-Administered Medications: None        Review of Systems    The 10 point Review of Systems is negative other than noted in the HPI or here.    Social History   I have reviewed this patient's social history and updated it with pertinent information if needed.  Social History     Tobacco Use    Smoking status: Former     Current packs/day: 0.00     Types: Cigarettes     Quit date: 2020     Years since quittin.0    Smokeless tobacco: Never   Substance Use Topics    Alcohol use: Yes     Comment: rare    Drug use: Never     Allergies   Allergies   Allergen Reactions    Atorvastatin      Lipitor    Colesevelam Nausea and Vomiting    Lisinopril Cough    Pravastatin Other (See Comments) and Muscle Pain (Myalgia)     myalgia    Rosuvastatin      crestor    Statins [Statins] Other (See Comments)     Aches          Physical Exam   Vital Signs: Temp: 98.9  F (37.2  C) Temp src: Oral BP: (!) 153/91 Pulse: 91   Resp: 18 SpO2: 92 % O2 Device: None (Room air)    Weight: 0 lbs 0 oz    Constitutional: awake, alert, cooperative, no apparent distress, laying in the hospital bed  Respiratory: no increased work of breathing, clear to auscultation bilaterally, no crackles or wheezing  Cardiovascular: regular rate and rhythm, normal S1 and S2, no murmur noted  GI: normal bowel sounds, soft, non-distended, non-tender  Skin: warm, dry, bruising on left cheek and forehead, left knee erythema  Musculoskeletal: bilateral lower  extremity lymphedema  Neurologic: awake, alert, answers questions appropriately, moves all extremities    Medical Decision Making       85 MINUTES SPENT BY ME on the date of service doing chart review, history, exam, documentation & further activities per the note.      Data

## 2024-07-31 NOTE — PLAN OF CARE
Goal Outcome Evaluation:                      Date/Time:7/30 2100-0730    Trauma/Ortho/Medical (Choose one) :Trauma/Ortho    Diagnosis:Left TKA prosthetic joint infection  POD#:Pending  Mental Status:A&O x 4  Activity/dangle:Not oob   Diet:NPO @ midnight  Pain:PRN oxycodone,tylenol  Fuchs/Voiding:External catheter  Tele/Restraints/Iso:NA  02/LDA:RA,PIV infusing LR  D/C Date:TBD  Other Info:CMS intact

## 2024-07-31 NOTE — PROGRESS NOTES
VSS, CMS intact. Edema BLE. Wound BLE, right elbow, left face. Coccyx non blanchable redness. Red rash under breast, abd fold and groin area. Woc consulted. Pain managed with oxycodone and Tylenol.  Incontinent of bladder, purewick in place. A&OX4.

## 2024-07-31 NOTE — PROGRESS NOTES
Pt has coverage for iv abx through their [facility] Medicare Advantage plan. Pt has an 80/20 coverage until they reach their out of pocket of $3800 (met $10 at this time).     ([facility]) In reference to admission date 07/30/2024.      Please contact Intake with any questions, [phone] or In Basket pool, [facility] ([phone]).

## 2024-07-31 NOTE — OP NOTE
OPERATIVE NOTE    Name: Helga Geronimo  MRN: [id number]  Age: 74 year old    YOB: 1950    Date of Procedure: 7/31/2024      Pre-operative Diagnosis:   Left knee acute hematogenous prosthetic joint infection    Post-operative Diagnosis:   Left knee acute hematogenous prosthetic joint infection    Procedure:   Left knee irrigation debridement with polyethylene exchange    Assistant:  Steven Quinn PA-C    A skilled first assistant was necessary for this procedure for assistance with patient positioning, prepping, draping, surgical visualization, wound closure, and application of the dressing.     Anesthesia:  1.  General    Complications:  None    Estimated blood loss:    100cc    Transfusions:  None    Fluids:  Per anesthesia    Specimens:  5 tissue cultures    Drain:  None      Indications:   Helga is a pleasant 74-year-old female who unfortunately fell at home on July 27 and developed new left knee pain on July 28.  She was brought by EMS to [facility] where the left knee was found to be red, swollen, and painful.  She underwent left knee aspiration and blood cultures were drawn.  Blood cultures grew Pasteurella.  Her left knee aspiration was significant for a cell count of 60,667 and 94% neutrophils.  Cultures from the aspiration are pending.  She was subsequently transferred to [facility] for further evaluation and management.  She has a history of left total knee arthroplasty performed by Dr. Yu in 2020.  This morning, she states her left knee did not bother her before July 28.  Since July 28, she has noticed new and increased left knee pain.  On physical exam, she does have a small effusion.  Her left knee incision is well-healed.  She has significant pain with any passive range of motion of the left knee.  Radiographs of the left knee from [facility] on July 29 demonstrate cemented left total knee arthroplasty components in reasonable position that are  well-fixed.  I had a long discussion with Helga regarding her diagnosis and treatment options.  My recommendation is to proceed with a left knee irrigation and debridement with modular exchange today. We reviewed the risks and benefits of surgery. She is currently on Unasyn.  We will obtain cultures intraoperatively.       Informed Consent:  Preoperatively, the risks, benefits, and possible complications of the procedure were discussed. The risks discussed included but were not limited to wear, loosening, infection, neurovascular damage, thromboembolic disease, foot drop, limb length inequality, persistent pain, lateral thigh numbness, wound healing complications, bleeding, transfusion, stiffness and dislocation. All of the questions were satisfactorily answered and the patient wished to proceed with joint replacement surgery to improve their lifestyle and reduce their pain.       Components:  18mm PS 71/75 [employer] vanguard polyethylene     Procedural Pause:   The patient's correct identity, side, site, and procedure to be performed was verified.  Intravenous antibiotics were administered prior to skin incision.    Description of Procedure:   Helga Geronimo was brought to the operating room.  General anesthesia was induced.  She was subsequent transferred to the operating room table.  All bony prominences well-padded.  A nonsterile thigh tourniquet was placed.  The left lower extremities prepped and draped in routine sterile fashion.  A procedural pause was performed as described above.  The tourniquet was inflated.  The patient's previous incision was excised.  Sharp dissection was carried down to the level of the quadriceps tendon and joint capsule.  A medial parapatellar arthrotomy was performed.  Cloudy fluid was encountered.  A medial release was performed.  The medial and lateral gutters were debrided and reestablished.  A complete synovectomy was performed.  The polyethylene was removed.  5 tissue  cultures were obtained and sent to pathology.  The posterior knee was further debrided.  We began irrigating with 3 L of normal saline followed by 500 cc of Betadine saline solution.  Further debridement was performed.  We then irrigated with 3 more liters of normal saline followed by 1 L of Bactisure.  A new 18 mm PS polyethylene insert was placed and the locking pin was inserted.  Patient was brought through full range of motion and the patient had satisfactory range of motion from 0-100 degrees with adequate varus and valgus stability at 0 and 90 degrees of flexion.  The tourniquet was let down after 57 minutes.  Hemostasis was obtained.  I then irrigated with 3 L of normal saline.  Prior to closure, a subfascial Hemovac drain was placed.  The quadriceps tendon and arthrotomy was closed with #1 PDS followed by #1 strata fix.  Subcutaneous tissue was closed with 0 PDS 2-0 Monocryl.  The skin was closed with 3-0 nylon in vertical mattress fashion.  All counts were correct.  A Prevena incisional wound VAC was placed.  The patient was placed into a knee immobilizer.  The patient woke up from anesthesia without issue and transferred to PACU in stable condition.      Post-operative Plan:  Activity: Weightbearing as tolerated.  Knee immobilizer on at all times for 2 weeks.  Antibiotics: Per infectious disease  DVT: Lovenox 40 mg  Wound: Prevena incisional wound VAC for 14 days  Disposition: Hospitalist      Mingo Sanchez MD  [facility]  Phone: [phone]  Fax: [phone]

## 2024-07-31 NOTE — DISCHARGE INSTRUCTIONS
"EdemaWear stockings: Use and Care    Rationale for use:   Decreases edema by improving lymphatic and venous function    Safe and gentle compression  Enhances wound healing  Protects skin    General:   EdemaWear can be worn 24/7, but should be removed at least daily for skin inspection and cares    In order to be effective, EdemaWear should DIRECTLY contact the skin as much as possible -- the mesh weave promotes lymphatic drainage when it is pressed into the skin  Choose appropriate size EdemaWear based on leg (or arm) circumference - see table for guidelines  EdemaWear LITE is only for especially fragile or painful skin  Cleanse and moisturize any intact or scaly skin before applying EdemaWear  Ok to apply additional compression over the EdemaWear (ie Lymph wraps)    Application:   Apply the EdemaWear from base of toes to knee, or above knee if tolerated and it is a long stocking  Create a wide 3\" cuff at the top if needed to prevent rolling down  Only trim the stocking if it is excessively long; do NOT cut in half; can often fold over excess length onto foot    When wounds are present:  Wound dressings that directly treat the wound bed can be applied under the EdemaWear  Any additional cover dressings (dry gauze, ABD pads, Kerlix, etc) should be applied ON TOP of the stockings whenever feasible   Stockings may get soiled with drainage and will need to be washed; ensure an extra clean, dry pair always available  May need two people to apply the stocking - bunch up stocking and pull against each other, lifting over wounds    Care:  When stockings are soiled, DO NOT THROW AWAY, hand wash with mild soap, rinse, hang dry  Replace approximately every 4 to 6 months        EdemaWear size Max circumference Stripe color PS # # stockings per pack   Small 18\"  (45cm) navy [id number] 2   Medium 30\"  (75cm) yellow [id number] 1   Large 46\"  (115cm) red [id number] 1   X Large 60\"  (150cm) aqua [id number] 1   Small LITE 24\"  (60cm) purple [id number] 2 " "  Medium LITE 36\"  (90cm) orange [id number] 1      "

## 2024-07-31 NOTE — PROGRESS NOTES
[facility]  Hospitalist Progress Note  Salazar Laws MD  07/31/2024    Assessment & Plan   Helga Geronimo is a 74 year old female with a history of hypertension, hyperlipidemia, chronic kidney disease, COPD, depression, anxiety, bilateral lower extremity lymphedema, and morbid obesity who transferred from [facility] to [facility] on 07/30/2024 due to bacteremia and left TKA prosthetic joint infection.     Pasteurella bacteremia  Left TKA prosthetic joint infection with planned I & D with modular exchange  Left lower extremity cellulitis  Bilateral lower extremity lymphedema  Patient slipped and fell prior to admission. Developed left knee pain after the fall. Had erythema of left knee/leg at time of admission. History of bilateral lower extremity lymphedema and wounds. Blood cultures from [facility] positive for Pasteurella. S/p left knee arthrocentesis - culture pending, cell count with 68,000 nucleated cells (94% neutrophils). Transferred from [facility] to [facility] on 7/30/24 for further evaluation and management by Dr. Yu, who is the patient's primary Orthopedic Surgeon that performed her initial left TKA in August 2020.  Admit to inpatient.  Orthopedic surgery consult noted  Infectious Disease consult, appreciated, continue Unasyn, await intraoperative cultures  ID was following at previous facility, recommended Unasyn for treatment of Pasteurella bacteremia and prosthetic joint infection. Will continue Unasyn 3 g every 6 hours.  Will need PT/OT consults, will wait until after she is seen by Orthopedic Surgery and plan of care is determined.  Care transitions consult regarding discharge planning.  WOC nurse consulted regarding chronic lower extremity wounds, right elbow wound, and left cheek wound.     Rhabdomyolysis  Fall with prolonged time on floor  Secondary to fall and laying on the floor overnight.  CK initially up to 9,563, then  trending down, 5,334 on 7/30/24.  Continue IV fluids.  Recheck CK si 2435, will repeat and follow  Continue IVF and urine output.     Hypertension  Blood pressure mildly elevated.  Continue PTA olmesartan-hydrochlorothiazide.  Hold PTA torsemide for now, reassess tomorrow pending surgical plans.     Hyperlipidemia  Continue PTA ezetimibe.  Prior history of significant myalgias with statins.     Chronic kidney disease, stage 3  Creatinine stable at baseline in the ER at 1.53 on 7/28/2024.  Creatinine stable at 1.41 on 7/29/2024.  Recheck labs in AM.  Avoid nephrotoxic medications as able.     COPD  Denies shortness of breath, wheezing.  Continue PTA Symbicort or formulary equivalent.  Continue PTA Spiriva.  Can resume PTA as needed albuterol inhaler if needed.     Depression  Anxiety  Continue PTA fluoxetine, risperdal, and trazodone.     Morbid obesity  BMI 43.89 kg/m2 per outside records.  Increases all cause mortality.  Follow up with PCP regarding long term management.    Diet: NPO per Anesthesia Guidelines for Procedure/Surgery Except for: Meds  Combination Diet Regular Diet Adult    DVT Prophylaxis: Pneumatic Compression Devices  Fuchs Catheter: Not present  Lines: None     Cardiac Monitoring: None  Code Status: Full Code          Clinically Significant Risk Factors Present on Admission                # Drug Induced Platelet Defect: home medication list includes an antiplatelet medication   # Hypertension: Home medication list includes antihypertensive(s)      Disposition Plan  -- anticipate to TCU    Disposition:   -- anticipate in 2-4 days    Interval History   -- chart reviewed  -- ID and Ortho notes reviewed    -Data reviewed today: I reviewed all new labs and imaging over the last 24 hours. I personally reviewed no images or EKG's today.    Physical Exam    , Blood pressure (!) 158/103, pulse 88, temperature 98.2  F (36.8  C), temperature source Axillary, resp. rate 18, SpO2 94%.  There were no vitals  filed for this visit.  Vital Signs with Ranges  Temp:  [98.2  F (36.8  C)-98.9  F (37.2  C)] 98.2  F (36.8  C)  Pulse:  [88-91] 88  Resp:  [18] 18  BP: (153-158)/() 158/103  SpO2:  [92 %-94 %] 94 %  I/O's Last 24 hours  No intake/output data recorded.          Medications   All medications were reviewed.  Current Facility-Administered Medications   Medication Dose Route Frequency Provider Last Rate Last Admin     Current Facility-Administered Medications   Medication Dose Route Frequency Provider Last Rate Last Admin    [Auto Hold] ampicillin-sulbactam (UNASYN) 3 g vial to attach to  mL bag  3 g Intravenous Q6H Bne De Oliveira MD   3 g at 07/31/24 1111    [Auto Hold] aspirin EC tablet 81 mg  81 mg Oral Daily Ben De Oliveira MD        ceFAZolin Sodium (ANCEF) injection 2 g  2 g Intravenous Pre-Op/Pre-procedure x 1 dose Mingo Sanchez MD        ceFAZolin Sodium (ANCEF) injection 2 g  2 g Intravenous See Admin Instructions Mingo Sanchez MD        [Auto Hold] ezetimibe (ZETIA) tablet 10 mg  10 mg Oral Daily Ben De Oliveira MD   10 mg at 07/31/24 0826    [Auto Hold] FLUoxetine (PROzac) capsule 40 mg  40 mg Oral BID Ben De Oliveira MD   40 mg at 07/31/24 0833    [Auto Hold] fluticasone-vilanterol (BREO ELLIPTA) 100-25 MCG/ACT inhaler 1 puff  1 puff Inhalation Daily Ben De Oliveira MD   1 puff at 07/31/24 0830    [Auto Hold] gabapentin (NEURONTIN) capsule 900 mg  900 mg Oral TID Ben De Oliveira MD   900 mg at 07/31/24 0825    [Auto Hold] losartan-hydrochlorothiazide (HYZAAR) 50-12.5 MG per tablet 1 tablet  1 tablet Oral Daily Ben De Oliveira MD   1 tablet at 07/31/24 0827    [Auto Hold] miconazole (MICATIN) 2 % powder   Topical BID Ben De Oliveira MD   Given at 07/31/24 0829    [Auto Hold] risperiDONE (risperDAL) tablet 1 mg  1 mg Oral TID EklofBen MD   1 mg at 07/31/24 0827    ROPivacaine (NAROPIN) 5 MG/ mg,  ketorolac (TORADOL) 30 mg, EPINEPHrine (ADRENALIN) 0.6 mg in sodium chloride 0.9 % 100 mL (ORTHO ANABELA STANDARD DOSE)   INTRA-ARTICULAR On Call to OR Mingo Sanchez MD        [Auto Hold] sodium chloride (PF) 0.9% PF flush 3 mL  3 mL Intracatheter Q8H Ben De Oliveira MD   3 mL at 07/30/24 2347    [Held by provider] torsemide (DEMADEX) tablet 20 mg  20 mg Oral Daily Ben De Oliveira MD        tranexamic acid 1 g in 100 mL NS IV bag (premix)  1 g Intravenous Once Mingo Sanchez MD        tranexamic acid 1 g in 100 mL NS IV bag (premix)  1 g Intravenous Once Mingo Sanchez MD        [Auto Hold] traZODone (DESYREL) tablet 100 mg  100 mg Oral At Bedtime Ben De Oliveira MD   100 mg at 07/30/24 2348    [Auto Hold] umeclidinium (INCRUSE ELLIPTA) 62.5 MCG/ACT inhaler 1 puff  1 puff Inhalation QAM Ben De Oliveira MD   1 puff at 07/31/24 0833        Data   Recent Labs   Lab 07/31/24  0636   WBC 7.1   HGB 11.0*   MCV 99         POTASSIUM 4.0   CHLORIDE 99   CO2 28   BUN 18.9   CR 1.03*   ANIONGAP 9   BRAYAN 8.6*   GLC 98   ALBUMIN 2.9*   PROTTOTAL 6.2*   BILITOTAL 0.4   ALKPHOS 60   ALT 51*   AST 99*       No results found for this or any previous visit (from the past 24 hour(s)).    Salazar Laws MD  Text Page  (7am to 6pm)

## 2024-07-31 NOTE — ANESTHESIA PREPROCEDURE EVALUATION
Anesthesia Pre-Procedure Evaluation    Patient: Helga Geronimo   MRN: [id number] : 1950        Procedure : Procedure(s):  Irrigation and debridement with polyethylene exchange          Past Medical History:   Diagnosis Date    Anxiety     Anxiety     Arthritis     severe degenerative-low back    Back pain, chronic     Bilateral lower extremity edema     Chronic seasonal allergic rhinitis     COPD (chronic obstructive pulmonary disease) (H)     Depression     Depressive disorder     Hypertension     Intermittent asthma     Iron deficiency anemia     Morbid obesity (H)     OA (osteoarthritis)     Statin myopathy     Tobacco use disorder     Tubular adenoma     Vitamin B12 deficiency       Past Surgical History:   Procedure Laterality Date    ARTHROPLASTY KNEE Left 2020    Procedure: LEFT TOTAL KNEE ARTHROPLASTY;  Surgeon: Shola Yu MD;  Location:  OR    ARTHROPLASTY SHOULDER Left 2019    Procedure: LEFT TOTAL SHOULDER ARTHROPLASTY;  Surgeon: Shola Yu MD;  Location:  OR    bilat hip replacements       EYE SURGERY      GYN SURGERY      HYSTERECTOMY      JOINT REPLACEMENT        Allergies   Allergen Reactions    Atorvastatin      Lipitor    Colesevelam Nausea and Vomiting    Lisinopril Cough    Pravastatin Other (See Comments) and Muscle Pain (Myalgia)     myalgia    Rosuvastatin      crestor    Statins [Statins] Other (See Comments)     Aches        Social History     Tobacco Use    Smoking status: Former     Types: Cigarettes     Start date:      Quit date: 2020     Years since quittin.0    Smokeless tobacco: Never   Substance Use Topics    Alcohol use: Never     Comment: rare      Wt Readings from Last 1 Encounters:   24 115.7 kg (255 lb)        Anesthesia Evaluation            ROS/MED HX  ENT/Pulmonary:     (+)           allergic rhinitis,     tobacco use, Past use,     asthma    COPD,              Neurologic:       Cardiovascular:     (+) Dyslipidemia  hypertension- -   -  - -                                      METS/Exercise Tolerance:     Hematologic:     (+)      anemia,          Musculoskeletal:   (+)  arthritis,             GI/Hepatic:       Renal/Genitourinary:       Endo:     (+)               Obesity,       Psychiatric/Substance Use:     (+) psychiatric history anxiety and depression       Infectious Disease:       Malignancy:       Other:            Physical Exam    Airway        Mallampati: II   TM distance: > 3 FB   Neck ROM: full   Mouth opening: > 3 cm    Respiratory Devices and Support         Dental  no notable dental history         Cardiovascular          Rhythm and rate: regular and normal     Pulmonary   pulmonary exam normal                OUTSIDE LABS:  CBC:   Lab Results   Component Value Date    WBC 7.1 07/31/2024    WBC 8.4 08/29/2020    HGB 11.0 (L) 07/31/2024    HGB 10.3 (L) 08/30/2020    HCT 32.3 (L) 07/31/2024    HCT 33.7 (L) 08/29/2020     07/31/2024     08/29/2020     BMP:   Lab Results   Component Value Date     07/31/2024     08/29/2020    POTASSIUM 4.0 07/31/2024    POTASSIUM 3.7 08/30/2020    CHLORIDE 99 07/31/2024    CHLORIDE 103 08/29/2020    CO2 28 07/31/2024    CO2 30 08/29/2020    BUN 18.9 07/31/2024    BUN 14 08/29/2020    CR 1.03 (H) 07/31/2024    CR 0.86 08/29/2020    GLC 98 07/31/2024     (H) 08/29/2020     COAGS:   Lab Results   Component Value Date    PTT 30 12/02/2009    INR 1.25 (H) 12/10/2009     POC:   Lab Results   Component Value Date     (H) 12/11/2009     HEPATIC:   Lab Results   Component Value Date    ALBUMIN 2.9 (L) 07/31/2024    PROTTOTAL 6.2 (L) 07/31/2024    ALT 51 (H) 07/31/2024    AST 99 (H) 07/31/2024    ALKPHOS 60 07/31/2024    BILITOTAL 0.4 07/31/2024     OTHER:   Lab Results   Component Value Date    LACT 2.3 (H) 12/10/2009    BRAYAN 8.6 (L) 07/31/2024    LIPASE 34 12/09/2009       Anesthesia Plan    ASA Status:  2    NPO Status:  NPO Appropriate    Anesthesia  "Type: General.     - Airway: ETT   Induction: Intravenous, Propofol.   Maintenance: Balanced.   Techniques and Equipment:     - Airway: Video-Laryngoscope       Consents    Anesthesia Plan(s) and associated risks, benefits, and realistic alternatives discussed. Questions answered and patient/representative(s) expressed understanding.     - Discussed:     - Discussed with:  Patient            Postoperative Care    Pain management: IV analgesics, Oral pain medications.   PONV prophylaxis: Ondansetron (or other 5HT-3), Dexamethasone or Solumedrol, Background Propofol Infusion     Comments:               Shine Rodríguez MD    I have reviewed the pertinent notes and labs in the chart from the past 30 days and (re)examined the patient.  Any updates or changes from those notes are reflected in this note.          # Hypoalbuminemia: Lowest albumin = 2.9 g/dL at 7/31/2024  6:36 AM, will monitor as appropriate    # Drug Induced Platelet Defect: home medication list includes an antiplatelet medication  # Severe Obesity: Estimated body mass index is 43.77 kg/m  as calculated from the following:    Height as of this encounter: 1.626 m (5' 4\").    Weight as of this encounter: 115.7 kg (255 lb).      "

## 2024-07-31 NOTE — CONSULTS
[facility]    Infectious Disease Consultation     Date of Admission:  7/30/2024  Date of Consult (When I saw the patient): 07/31/24    Assessment & Plan   Helga Geronimo is a 74 year old female who was admitted on 7/30/2024.     Impression:  75 yo admitted with transferred from [facility] on July 30, 2024 due to a left prosthetic joint infection after a fall and inability to get up   History of total knee arthroplasty many yrs ago   The blood cultures were positive for Pasteurella.    She has 1 cat but no history of a bite   The aspiration from the left knee was significant for a total nucleated cell count of 68,000 cells with 94% neutrophils.  Cultures from the aspiration are pending.    She is subsequently transferred to [facility] for further evaluation and management   Noted orthopedic consult and plan for surgery today we will follow-up on operative note  She is currently on Unasyn    Recommendations:  Continue on Unasyn  Follow-up on the blood cultures done here  Follow-up on the OR cultures  Follow up on the aspiration culture from the knee     Harsh Mills MD    Reason for Consult   Reason for consult: I was asked to evaluate this patient for left TKA infection.    Primary Care Physician   Jennie Morales    Chief Complaint   Left TKA infection    History is obtained from the patient and medical records    History of Present Illness   Helga Geronimo is a 74 year old female who presents with left TKA infection from outside hospital.  She had presented with fevers, she was found bacteremic with Pasteurella  History of a fall at home and pain in the joint after that before that had no symptoms    Past Medical History   I have reviewed this patient's medical history and updated it with pertinent information if needed.   Past Medical History:   Diagnosis Date    Anxiety     Anxiety     Arthritis     severe degenerative-low back    Back pain, chronic     Bilateral lower  extremity edema     Chronic seasonal allergic rhinitis     COPD (chronic obstructive pulmonary disease) (H)     Depression     Depressive disorder     Hypertension     Intermittent asthma     Iron deficiency anemia     Morbid obesity (H)     OA (osteoarthritis)     Statin myopathy     Tobacco use disorder     Tubular adenoma     Vitamin B12 deficiency        Past Surgical History   I have reviewed this patient's surgical history and updated it with pertinent information if needed.  Past Surgical History:   Procedure Laterality Date    ARTHROPLASTY KNEE Left 8/26/2020    Procedure: LEFT TOTAL KNEE ARTHROPLASTY;  Surgeon: Shola Yu MD;  Location: [facility] OR    ARTHROPLASTY SHOULDER Left 11/20/2019    Procedure: LEFT TOTAL SHOULDER ARTHROPLASTY;  Surgeon: Shola Yu MD;  Location:  OR    bilat hip replacements       EYE SURGERY      GYN SURGERY      HYSTERECTOMY      JOINT REPLACEMENT         Prior to Admission Medications   Prior to Admission Medications   Prescriptions Last Dose Informant Patient Reported? Taking?   FLUoxetine (PROZAC) 40 MG capsule 7/30/2024 at am  Yes Yes   Sig: Take 40 mg by mouth 2 times daily   acetaminophen (TYLENOL) 325 MG tablet 7/30/2024 at am Self No Yes   Sig: Take 2 tablets (650 mg) by mouth every 4 hours as needed for other (mild pain)   albuterol (PROAIR HFA/PROVENTIL HFA/VENTOLIN HFA) 108 (90 Base) MCG/ACT inhaler 7/27/2024 Self Yes Yes   Sig: Inhale 2 puffs into the lungs daily as needed for shortness of breath / dyspnea or wheezing   amoxicillin (AMOXIL) 500 MG capsule  Self Yes No   Sig: Take 2,000 mg by mouth daily as needed (1 hour prior to dental work)   aspirin 81 MG EC tablet 7/29/2024  Yes Yes   Sig: Take 81 mg by mouth daily   budesonide-formoterol (SYMBICORT) 80-4.5 MCG/ACT Inhaler 7/30/2024 at am Self Yes Yes   Sig: Inhale 2 puffs into the lungs 2 times daily   ezetimibe (ZETIA) 10 MG tablet 7/30/2024 at am  Yes Yes   Sig: Take 10 mg by mouth daily   gabapentin  (NEURONTIN) 300 MG capsule 7/30/2024 at am Self Yes Yes   Sig: Take 900 mg by mouth 3 times daily (Take 3 X 300 mg = 900 mg dose)   olmesartan-hydrochlorothiazide (BENICAR HCT) 20-12.5 MG tablet 7/30/2024 at am  Yes Yes   Sig: Take 1 tablet by mouth daily   risperiDONE (RISPERDAL) 1 MG tablet 7/30/2024 at am Self Yes Yes   Sig: Take 1 mg by mouth 3 times daily   tiotropium (SPIRIVA RESPIMAT) 2.5 MCG/ACT inhaler 7/30/2024 at am Self Yes Yes   Sig: Inhale 2 puffs into the lungs every morning    torsemide (DEMADEX) 20 MG tablet 7/30/2024 at am  Yes Yes   Sig: Take 20 mg by mouth daily   traZODone (DESYREL) 100 MG tablet 7/29/2024 at pm Self Yes Yes   Sig: Take 100 mg by mouth At Bedtime       Facility-Administered Medications: None     Allergies   Allergies   Allergen Reactions    Atorvastatin      Lipitor    Colesevelam Nausea and Vomiting    Lisinopril Cough    Pravastatin Other (See Comments) and Muscle Pain (Myalgia)     myalgia    Rosuvastatin      crestor    Statins [Statins] Other (See Comments)     Aches         Immunization History   Immunization History   Administered Date(s) Administered    COVID-19 12+ (2023-24) (MODERNA) 10/21/2023    COVID-19 Bivalent 18+ (Moderna) 10/10/2022    COVID-19 MONOVALENT 12+ (Pfizer) 03/20/2021, 04/10/2021, 10/26/2021    COVID-19 Monovalent 12+ (Pfizer 2022) 04/28/2022       Social History   I have reviewed this patient's social history and updated it with pertinent information if needed. Helga Geronimo  reports that she quit smoking about 4 years ago. Her smoking use included cigarettes. She has never used smokeless tobacco. She reports current alcohol use. She reports that she does not use drugs.    Family History   I have reviewed this patient's family history and updated it with pertinent information if needed.   No family history on file.    Review of Systems   The 10 point Review of Systems is negative    Physical Exam   Temp: 98.2  F (36.8  C) Temp src: Axillary BP: (!)  "158/103 Pulse: 88   Resp: 18 SpO2: 94 % O2 Device: None (Room air)    Vital Signs with Ranges  Temp:  [98.2  F (36.8  C)-98.9  F (37.2  C)] 98.2  F (36.8  C)  Pulse:  [88-91] 88  Resp:  [18] 18  BP: (153-158)/() 158/103  SpO2:  [92 %-94 %] 94 %  0 lbs 0 oz  There is no height or weight on file to calculate BMI.    GENERAL APPEARANCE:  awake  EYES: Eyes grossly normal to inspection  A rash on the face from the fall   NECK: no adenopathy  RESP: lungs clear   CV: regular rates and rhythm  LYMPHATICS: normal ant/post cervical and supraclavicular nodes  ABDOMEN: soft, nontender  MS: bilateral edema and chronic skin changes left leg is warm minimal erytjhma   SKIN: no suspicious lesions or rashes        Data   All laboratory and imaging data in the past 24 hours reviewed  No results for input(s): \"CULT\" in the last 168 hours.  No lab results found.    Invalid input(s): \"UC\"       All cultures:  No results for input(s): \"CULTURE\" in the last 168 hours.   Blood culture:  Results for orders placed or performed in visit on 12/10/09   Blood culture   Result Value Ref Range    Specimen Description Blood SITE NOT SPECIFIED     Culture Micro No growth after 6 days     Micro Report Status FINAL [id number]       Urine culture:  Results for orders placed or performed in visit on 12/09/09   Urine culture   Result Value Ref Range    Specimen Description Catheterized Urine     Culture Micro No growth     Micro Report Status FINAL 12/11/2009              "

## 2024-07-31 NOTE — CONSULTS
CONSULT NOTE  Location: [facility]    SUBJECTIVE  Helga Geronimo is a 74 year old who was transferred from [facility] on July 30, 2024 due to a left prosthetic joint infection.  General left total knee arthroplasty with Dr. Yu in August 2020.  She states the left knee did well until Sunday.  She fell at home on Saturday night and was unable to get off the ground after the fall.  She states on Sunday her left knee started to become more painful.  EMS brought her to [facility].  She was found to be febrile and her left knee was found to be red and painful with motion.  She underwent blood cultures and a left knee aspiration.  The blood cultures were positive for Pasteurella.  The aspiration from the left knee was significant for a total nucleated cell count of 68,000 cells with 94% neutrophils.  Cultures from the aspiration are pending.  She is subsequently transferred to [facility] for further evaluation and management.  I met with Helga this morning in her hospital room.  She states her left knee pain has increased since Sunday.  Prior to Sunday, she states she had some soreness in both knees but nowhere near the pain she has today.  She has been on Unasyn since Sunday.  She has been afebrile since she has arrived at [facility].  She also has a history of right total knee arthroplasty.  She denies any new or increased pain in the right knee.  She states the right knee has functioned well since surgery.  She lives independently at home.  At baseline, she uses a cane with long distances.  She does have a cat but denies any recent cat bites or scratches.      PAST MEDICAL HISTORY  Bilateral lower extremity lymphedema  Hypertension  Hyperlipidemia  CKD stage III  COPD  Morbid obesity with BMI of 44    SOCIAL HISTORY  She lives independently in her own home.  She uses a cane when walking longer distances.    PHYSICAL EXAM  General: No acute distress.  Alert and  oriented.  Cardiac: Regular rate and rhythm  Respiratory: Breathing comfortably on room air  Skin: Well-healed left total knee incision.  1+ effusion.  No erythema surrounding the left knee.  Musculoskeletal: Significant pain with any passive range of motion of the left knee.  No pain with passive range of motion of the right knee.  Neuro: She is able to fire TA, HL, and gastroc bilaterally.  Sensation intact light touch in bilateral lower extremities.  Vascular: Palpable left PT pulse    IMAGING  Radiographs from outside hospital on July 29, 2024 demonstrates cemented left total knee arthroplasty in reasonable position without evidence of loosening or failure.  No evidence of periprosthetic fracture.    LABS  .3  Hemoglobin 11  Blood cultures from July 31, 2024 pending  Synovial fluid cell count from July 29, 2024: Total nucleated cell count 68,667, 94% neutrophils    ASSESSMENT/PLAN  #1  Acute hematogenous left prosthetic joint infection  #2  Pasteurella bacteremia  #3  Morbid obesity  #4  Bilateral lower extremity lymphedema  #5  Status post left total knee arthroplasty in 2020    Helga is a pleasant 74-year-old female who unfortunately fell at home on July 27 and developed new left knee pain on July 28.  She was brought by EMS to [facility] where the left knee was found to be red, swollen, and painful.  She underwent left knee aspiration and blood cultures were drawn.  Blood cultures grew Pasteurella.  Her left knee aspiration was significant for a cell count of 60,667 and 94% neutrophils.  Cultures from the aspiration are pending.  She was subsequent transferred to [facility] for further evaluation and management.  She has a history of left total knee arthroplasty performed by Dr. Yu in 2020.  This morning, she states her left knee did not bother her before July 28.  Since July 28, she has noticed new and increased left knee pain.  On physical exam, she does have a small  effusion.  Her left knee incision is well-healed.  She has significant pain with any passive range of motion of the left knee.  Radiographs of the left knee from [facility] on July 29 demonstrate cemented left total knee arthroplasty components in reasonable position that are well-fixed.  I had a long discussion with Helga regarding her diagnosis and treatment options.  My recommendation is to proceed with a left knee irrigation and debridement with modular exchange today. We reviewed the risks and benefits of surgery. We reviewed the success rate with a DAIR is around 75% depending on the study. We discussed the failure of a DAIR is treated with a two-stage exchange.  She is currently on Unasyn.  We will obtain cultures intraoperatively today.  An infectious disease consult has been placed.  Following surgery, she will require a PICC line to be placed once she is no longer bacteremic.  We will proceed with surgery today.

## 2024-08-01 ENCOUNTER — APPOINTMENT (OUTPATIENT)
Dept: PHYSICAL THERAPY | Facility: CLINIC | Age: 74
DRG: 486 | End: 2024-08-01
Attending: ORTHOPAEDIC SURGERY
Payer: COMMERCIAL

## 2024-08-01 LAB
ANION GAP SERPL CALCULATED.3IONS-SCNC: 7 MMOL/L (ref 7–15)
BUN SERPL-MCNC: 18.4 MG/DL (ref 8–23)
CALCIUM SERPL-MCNC: 8.1 MG/DL (ref 8.8–10.4)
CHLORIDE SERPL-SCNC: 102 MMOL/L (ref 98–107)
CK SERPL-CCNC: 711 U/L (ref 26–192)
CREAT SERPL-MCNC: 1.13 MG/DL (ref 0.51–0.95)
EGFRCR SERPLBLD CKD-EPI 2021: 51 ML/MIN/1.73M2
ERYTHROCYTE [DISTWIDTH] IN BLOOD BY AUTOMATED COUNT: 12.3 % (ref 10–15)
GLUCOSE SERPL-MCNC: 124 MG/DL (ref 70–99)
HCO3 SERPL-SCNC: 29 MMOL/L (ref 22–29)
HCT VFR BLD AUTO: 31.2 % (ref 35–47)
HGB BLD-MCNC: 10 G/DL (ref 11.7–15.7)
MCH RBC QN AUTO: 32.3 PG (ref 26.5–33)
MCHC RBC AUTO-ENTMCNC: 32.1 G/DL (ref 31.5–36.5)
MCV RBC AUTO: 101 FL (ref 78–100)
PLATELET # BLD AUTO: 190 10E3/UL (ref 150–450)
POTASSIUM SERPL-SCNC: 4.8 MMOL/L (ref 3.4–5.3)
RBC # BLD AUTO: 3.1 10E6/UL (ref 3.8–5.2)
SODIUM SERPL-SCNC: 138 MMOL/L (ref 135–145)
WBC # BLD AUTO: 8 10E3/UL (ref 4–11)

## 2024-08-01 PROCEDURE — 97161 PT EVAL LOW COMPLEX 20 MIN: CPT | Mod: GP | Performed by: PHYSICAL THERAPIST

## 2024-08-01 PROCEDURE — 99233 SBSQ HOSP IP/OBS HIGH 50: CPT | Performed by: PHYSICIAN ASSISTANT

## 2024-08-01 PROCEDURE — 250N000013 HC RX MED GY IP 250 OP 250 PS 637: Performed by: PHYSICIAN ASSISTANT

## 2024-08-01 PROCEDURE — 250N000013 HC RX MED GY IP 250 OP 250 PS 637: Performed by: ORTHOPAEDIC SURGERY

## 2024-08-01 PROCEDURE — 85027 COMPLETE CBC AUTOMATED: CPT | Performed by: INTERNAL MEDICINE

## 2024-08-01 PROCEDURE — 250N000011 HC RX IP 250 OP 636: Performed by: HOSPITALIST

## 2024-08-01 PROCEDURE — 250N000013 HC RX MED GY IP 250 OP 250 PS 637: Performed by: HOSPITALIST

## 2024-08-01 PROCEDURE — 97530 THERAPEUTIC ACTIVITIES: CPT | Mod: GP | Performed by: PHYSICAL THERAPIST

## 2024-08-01 PROCEDURE — 36415 COLL VENOUS BLD VENIPUNCTURE: CPT | Performed by: INTERNAL MEDICINE

## 2024-08-01 PROCEDURE — 99232 SBSQ HOSP IP/OBS MODERATE 35: CPT | Performed by: INTERNAL MEDICINE

## 2024-08-01 PROCEDURE — 120N000001 HC R&B MED SURG/OB

## 2024-08-01 PROCEDURE — 82550 ASSAY OF CK (CPK): CPT | Performed by: INTERNAL MEDICINE

## 2024-08-01 PROCEDURE — 250N000011 HC RX IP 250 OP 636: Performed by: ORTHOPAEDIC SURGERY

## 2024-08-01 PROCEDURE — 82374 ASSAY BLOOD CARBON DIOXIDE: CPT | Performed by: INTERNAL MEDICINE

## 2024-08-01 RX ORDER — AMOXICILLIN 250 MG
1 CAPSULE ORAL 2 TIMES DAILY PRN
DISCHARGE
Start: 2024-08-01

## 2024-08-01 RX ORDER — ACETAMINOPHEN 325 MG/1
975 TABLET ORAL EVERY 8 HOURS PRN
DISCHARGE
Start: 2024-08-01

## 2024-08-01 RX ORDER — ALBUTEROL SULFATE 5 MG/ML
2.5 SOLUTION RESPIRATORY (INHALATION) EVERY 6 HOURS PRN
Status: DISCONTINUED | OUTPATIENT
Start: 2024-08-01 | End: 2024-08-01

## 2024-08-01 RX ORDER — ALBUTEROL SULFATE 90 UG/1
2 AEROSOL, METERED RESPIRATORY (INHALATION) EVERY 4 HOURS PRN
Status: DISCONTINUED | OUTPATIENT
Start: 2024-08-01 | End: 2024-08-06 | Stop reason: HOSPADM

## 2024-08-01 RX ORDER — OXYCODONE HYDROCHLORIDE 5 MG/1
2.5-5 TABLET ORAL EVERY 4 HOURS PRN
Qty: 20 TABLET | Refills: 0 | Status: SHIPPED | OUTPATIENT
Start: 2024-08-01

## 2024-08-01 RX ORDER — METHOCARBAMOL 500 MG/1
500 TABLET, FILM COATED ORAL EVERY 6 HOURS PRN
DISCHARGE
Start: 2024-08-01

## 2024-08-01 RX ORDER — POLYETHYLENE GLYCOL 3350 17 G/17G
17 POWDER, FOR SOLUTION ORAL DAILY
DISCHARGE
Start: 2024-08-01

## 2024-08-01 RX ORDER — ASPIRIN 81 MG/1
81 TABLET ORAL 2 TIMES DAILY
DISCHARGE
Start: 2024-08-01 | End: 2024-09-12

## 2024-08-01 RX ADMIN — MICONAZOLE NITRATE: 2 POWDER TOPICAL at 21:29

## 2024-08-01 RX ADMIN — AMPICILLIN SODIUM AND SULBACTAM SODIUM 3 G: 2; 1 INJECTION, POWDER, FOR SOLUTION INTRAMUSCULAR; INTRAVENOUS at 21:27

## 2024-08-01 RX ADMIN — ACETAMINOPHEN 975 MG: 325 TABLET, FILM COATED ORAL at 06:03

## 2024-08-01 RX ADMIN — POLYETHYLENE GLYCOL 3350 17 G: 17 POWDER, FOR SOLUTION ORAL at 09:11

## 2024-08-01 RX ADMIN — GABAPENTIN 900 MG: 300 CAPSULE ORAL at 16:07

## 2024-08-01 RX ADMIN — ACETAMINOPHEN 975 MG: 325 TABLET, FILM COATED ORAL at 21:28

## 2024-08-01 RX ADMIN — AMPICILLIN SODIUM AND SULBACTAM SODIUM 3 G: 2; 1 INJECTION, POWDER, FOR SOLUTION INTRAMUSCULAR; INTRAVENOUS at 04:43

## 2024-08-01 RX ADMIN — LOSARTAN POTASSIUM AND HYDROCHLOROTHIAZIDE 1 TABLET: 50; 12.5 TABLET, FILM COATED ORAL at 09:24

## 2024-08-01 RX ADMIN — ASPIRIN 81 MG: 81 TABLET, COATED ORAL at 21:29

## 2024-08-01 RX ADMIN — FLUOXETINE HYDROCHLORIDE 40 MG: 20 CAPSULE ORAL at 21:28

## 2024-08-01 RX ADMIN — GABAPENTIN 900 MG: 300 CAPSULE ORAL at 21:28

## 2024-08-01 RX ADMIN — FLUOXETINE HYDROCHLORIDE 40 MG: 20 CAPSULE ORAL at 09:07

## 2024-08-01 RX ADMIN — KETOROLAC TROMETHAMINE 15 MG: 15 INJECTION, SOLUTION INTRAMUSCULAR; INTRAVENOUS at 04:43

## 2024-08-01 RX ADMIN — AMPICILLIN SODIUM AND SULBACTAM SODIUM 3 G: 2; 1 INJECTION, POWDER, FOR SOLUTION INTRAMUSCULAR; INTRAVENOUS at 16:07

## 2024-08-01 RX ADMIN — RISPERIDONE 1 MG: 1 TABLET ORAL at 09:11

## 2024-08-01 RX ADMIN — EZETIMIBE 10 MG: 10 TABLET ORAL at 09:07

## 2024-08-01 RX ADMIN — TRAZODONE HYDROCHLORIDE 100 MG: 100 TABLET ORAL at 21:29

## 2024-08-01 RX ADMIN — RISPERIDONE 1 MG: 1 TABLET ORAL at 21:29

## 2024-08-01 RX ADMIN — SENNOSIDES AND DOCUSATE SODIUM 1 TABLET: 8.6; 5 TABLET ORAL at 09:11

## 2024-08-01 RX ADMIN — ASPIRIN 81 MG: 81 TABLET, COATED ORAL at 09:06

## 2024-08-01 RX ADMIN — GABAPENTIN 900 MG: 300 CAPSULE ORAL at 09:10

## 2024-08-01 RX ADMIN — AMPICILLIN SODIUM AND SULBACTAM SODIUM 3 G: 2; 1 INJECTION, POWDER, FOR SOLUTION INTRAMUSCULAR; INTRAVENOUS at 10:07

## 2024-08-01 RX ADMIN — MICONAZOLE NITRATE: 2 POWDER TOPICAL at 09:25

## 2024-08-01 RX ADMIN — ACETAMINOPHEN 975 MG: 325 TABLET, FILM COATED ORAL at 13:46

## 2024-08-01 RX ADMIN — OXYCODONE HYDROCHLORIDE 5 MG: 5 TABLET ORAL at 13:46

## 2024-08-01 RX ADMIN — OXYCODONE HYDROCHLORIDE 5 MG: 5 TABLET ORAL at 09:23

## 2024-08-01 RX ADMIN — CEFAZOLIN SODIUM 2 G: 2 INJECTION, SOLUTION INTRAVENOUS at 06:06

## 2024-08-01 RX ADMIN — SENNOSIDES AND DOCUSATE SODIUM 1 TABLET: 8.6; 5 TABLET ORAL at 21:28

## 2024-08-01 RX ADMIN — KETOROLAC TROMETHAMINE 15 MG: 15 INJECTION, SOLUTION INTRAMUSCULAR; INTRAVENOUS at 10:06

## 2024-08-01 RX ADMIN — FLUTICASONE FUROATE AND VILANTEROL 1 PUFF: 100; 25 POWDER RESPIRATORY (INHALATION) at 09:26

## 2024-08-01 RX ADMIN — KETOROLAC TROMETHAMINE 15 MG: 15 INJECTION, SOLUTION INTRAMUSCULAR; INTRAVENOUS at 16:07

## 2024-08-01 RX ADMIN — ALBUTEROL SULFATE 2 PUFF: 108 INHALANT RESPIRATORY (INHALATION) at 16:06

## 2024-08-01 RX ADMIN — RISPERIDONE 1 MG: 1 TABLET ORAL at 16:07

## 2024-08-01 ASSESSMENT — ACTIVITIES OF DAILY LIVING (ADL)
ADLS_ACUITY_SCORE: 42
DEPENDENT_IADLS:: INDEPENDENT
ADLS_ACUITY_SCORE: 34
ADLS_ACUITY_SCORE: 40
ADLS_ACUITY_SCORE: 42
ADLS_ACUITY_SCORE: 40
ADLS_ACUITY_SCORE: 42
ADLS_ACUITY_SCORE: 40
ADLS_ACUITY_SCORE: 34
ADLS_ACUITY_SCORE: 40
ADLS_ACUITY_SCORE: 34
ADLS_ACUITY_SCORE: 44
ADLS_ACUITY_SCORE: 34
ADLS_ACUITY_SCORE: 40
ADLS_ACUITY_SCORE: 34
ADLS_ACUITY_SCORE: 40
ADLS_ACUITY_SCORE: 34
ADLS_ACUITY_SCORE: 42
ADLS_ACUITY_SCORE: 40

## 2024-08-01 NOTE — PROGRESS NOTES
Orthopedic Surgery  Helga Geronimo  08/01/2024     Admit Date:  7/30/2024  POD: 1 Day Post-Op   Procedure(s):  Irrigation and debridement with polyethylene exchange Left TKA     Patient resting in chair.    She reports continued pain in her knee.   Tolerating oral intake.    Denies nausea or vomiting  Denies chest pain or shortness of breath    Temp:  [96.8  F (36  C)-98.7  F (37.1  C)] 97.8  F (36.6  C)  Pulse:  [73-98] 86  Resp:  [9-25] 16  BP: (112-164)/(63-96) 120/63  SpO2:  [90 %-99 %] 95 %    Alert and oriented  Dressing is clean, dry, and intact. Prevena incisional vac in place and functioning.   Hemovac in place (40 ml output)   Ace wrap in place.   Bilateral calves are soft, non-tender.  Left lower extremity is NVI.  Sensation intact bilateral lower extremities  Patient able to resist dorsi and plantar flexion bilaterally  +Dp pulse    Labs:  Recent Labs   Lab Test 08/01/24  0713 07/31/24  0636 08/30/20  0705 08/29/20  0640   WBC 8.0 7.1  --  8.4   HGB 10.0* 11.0* 10.3* 11.3*    199  --  214     No lab results found.  Recent Labs   Lab Test 07/31/24  0636   CRPI 203.30*     Knee cultures:  Gram stain: no orgs  Cultures pending    1. Plan:  Activity: WBAT with assist, KI at all times x 2 weeks   Dressing + changes: Prevena wound vac x 14 days. Keep prevena in place.    Drain: PHOEBE Drain on left lateral thigh/knee: remove POD #2 or when <30q shift   DVT proph: Aspirin 81mg bid   Abx: Per ID  Dispo: TCU  F/U: 2 weeks w/ Dr. Sanchez     2. Disposition   Anticipate d/c to TCU when Abx plan established, medically cleared and progressing in PT.    Yola Hennessy PA-C

## 2024-08-01 NOTE — CONSULTS
Care Management Initial Consult    General Information  Assessment completed with: Patient,    Type of CM/SW Visit: Initial Assessment    Primary Care Provider verified and updated as needed: Yes   Readmission within the last 30 days:        Reason for Consult: discharge planning  Advance Care Planning:            Communication Assessment  Patient's communication style: spoken language (English or Bilingual)    Hearing Difficulty or Deaf: no   Wear Glasses or Blind: yes    Cognitive  Cognitive/Neuro/Behavioral: WDL  Level of Consciousness: alert  Arousal Level: arouses to voice  Orientation: oriented x 4  Mood/Behavior: calm, cooperative     Speech: clear, spontaneous, logical    Living Environment:   People in home: alone     Current living Arrangements: apartment, condominium      Able to return to prior arrangements: yes       Family/Social Support:  Care provided by: self  Provides care for: no one  Marital Status: Single  Children          Description of Support System: Supportive, Involved         Current Resources:   Patient receiving home care services: No     Community Resources: None  Equipment currently used at home: cane, straight  Supplies currently used at home: None    Employment/Financial:  Employment Status: retired        Financial Concerns:             Does the patient's insurance plan have a 3 day qualifying hospital stay waiver?  No    Lifestyle & Psychosocial Needs:  Social Determinants of Health     Food Insecurity: No Food Insecurity (7/28/2024)    Received from [employer]    Food Insecurity     Worried About Running Out of Food in the Last Year: 1   Depression: Not at risk (2/12/2024)    Received from [employer]    PHQ-2     PHQ-2 TOTAL SCORE: 2   Housing Stability: Low Risk  (7/28/2024)    Received from [employer]    Housing Stability     Unable to Pay for Housing in the Last Year: 1    Tobacco Use: Medium Risk (7/31/2024)    Patient History     Smoking Tobacco Use: Former     Smokeless Tobacco Use: Never     Passive Exposure: Not on file   Financial Resource Strain: Low Risk  (7/28/2024)    Received from [facility] [facility]    Financial Resource Strain     Difficulty of Paying Living Expenses: 3     Difficulty of Paying Living Expenses: Not on file   Alcohol Use: Not on file   Transportation Needs: No Transportation Needs (7/28/2024)    Received from [facility] [facility]    Transportation Needs     Lack of Transportation (Medical): 1   Physical Activity: Not on file   Interpersonal Safety: Not on file   Stress: Not on file   Social Connections: Socially Integrated (7/28/2024)    Received from [facility] [facility]    Social Connections     Frequency of Communication with Friends and Family: 0   Health Literacy: Not on file       Functional Status:  Prior to admission patient needed assistance:   Dependent ADLs:: Independent  Dependent IADLs:: Independent  Assesssment of Functional Status: Not at baseline with mobility    Mental Health Status:  Mental Health Status: No Current Concerns       Chemical Dependency Status:                Values/Beliefs:  Spiritual, Cultural Beliefs, Yarsani Practices, Values that affect care:                 Additional Information:  Writer met with patient in her room and introduced self and  role in discharge planning.  Patient up in chair; Shares that she lives alone in a [address]+ apt and is independent at baseline.  Uses a cane when out and about.   Patient worked with PT this morning and had a difficult time getting into the chair.  PT rec TCU.  Patient stated she has been to TCU in the past after shoulder surgery. Patient lives in [place] and would like to go to [facility] in [place].  Writer expained she will  need to make 3 to 5 selections from  [facility].  Writer will make the [facility]  referral if it is on the list.  Writer will bring a printed [facility] TCU list to patient and let SW know pt will need TCU.    Rohini Martínez RN

## 2024-08-01 NOTE — PROGRESS NOTES
[facility]    Infectious Disease Progress Note    Date of Service (when I saw the patient): 08/01/2024     Assessment & Plan   Helga Geronimo is a 74 year old female who was admitted on 7/30/2024.     mpression:  75 yo admitted with transferred from [facility] on July 30, 2024 due to a left prosthetic joint infection after a fall and inability to get up   History of total knee arthroplasty many yrs ago   The blood cultures were positive for Pasteurella.    She has 1 cat but no history of a bite   The aspiration from the left knee was significant for a total nucleated cell count of 68,000 cells with 94% neutrophils.  Cultures from the aspiration are pending.    She is subsequently transferred to [facility] for further evaluation and management   She is currently on Unasyn  S/p left knee I&D with poly exchange     Recommendations:  Continue on Unasyn  Follow-up on the blood cultures done here  Follow-up on the OR cultures  Follow up on the aspiration culture from the knee from OSH   Discussed with KAYLYN Mills MD    Interval History   Tolerating antibiotics ok   No new rashes or issues with antibiotics   Labs reviewed   No changes to past medical, social or family history   S/p above-mentioned surgery      Physical Exam   Temp: 97.8  F (36.6  C) Temp src: Oral BP: 120/63 Pulse: 86   Resp: 16 SpO2: 95 % O2 Device: Nasal cannula Oxygen Delivery: 2 LPM  Vitals:    07/31/24 1514   Weight: 115.7 kg (255 lb)     Vital Signs with Ranges  Temp:  [96.8  F (36  C)-98.7  F (37.1  C)] 97.8  F (36.6  C)  Pulse:  [73-98] 86  Resp:  [9-25] 16  BP: (112-164)/(63-96) 120/63  SpO2:  [90 %-99 %] 95 %    Constitutional: Awake, alert, cooperative, no apparent distress  Lungs: Clear to auscultation bilaterally, no crackles or wheezing  Cardiovascular: Regular rate and rhythm, normal S1 and S2, and no murmur noted  Abdomen: Normal bowel sounds, soft, non-distended, non-tender  Skin: No  rashes, no cyanosis, no edema  Other:    Medications   Current Facility-Administered Medications   Medication Dose Route Frequency Provider Last Rate Last Admin    lactated ringers infusion   Intravenous Continuous Mingo Sanchez MD 75 mL/hr at 07/31/24 2250 New Bag at 07/31/24 2250     Current Facility-Administered Medications   Medication Dose Route Frequency Provider Last Rate Last Admin    acetaminophen (TYLENOL) tablet 975 mg  975 mg Oral Q8H Mingo Sanchze MD   975 mg at 08/01/24 0603    ampicillin-sulbactam (UNASYN) 3 g vial to attach to  mL bag  3 g Intravenous Q6H Ben De Oliveira MD   3 g at 08/01/24 1007    aspirin EC tablet 81 mg  81 mg Oral BID Mingo Sanchez MD   81 mg at 08/01/24 0906    ezetimibe (ZETIA) tablet 10 mg  10 mg Oral Daily Ben De Oliveira MD   10 mg at 08/01/24 0907    FLUoxetine (PROzac) capsule 40 mg  40 mg Oral BID Ben De Oliveira MD   40 mg at 08/01/24 0907    fluticasone-vilanterol (BREO ELLIPTA) 100-25 MCG/ACT inhaler 1 puff  1 puff Inhalation Daily Ben De Oliveira MD   1 puff at 08/01/24 0926    gabapentin (NEURONTIN) capsule 900 mg  900 mg Oral TID Ben De Oliveira MD   900 mg at 08/01/24 0910    ketorolac (TORADOL) injection 15 mg  15 mg Intravenous Q6H Mingo Sanchez MD   15 mg at 08/01/24 1006    losartan-hydrochlorothiazide (HYZAAR) 50-12.5 MG per tablet 1 tablet  1 tablet Oral Daily Ben De Oliveira MD   1 tablet at 08/01/24 0924    miconazole (MICATIN) 2 % powder   Topical BID Ben De Oliveira MD   Given at 08/01/24 0925    polyethylene glycol (MIRALAX) Packet 17 g  17 g Oral Daily Mingo Sanchez MD   17 g at 08/01/24 0911    risperiDONE (risperDAL) tablet 1 mg  1 mg Oral TID Ben De Oliveira MD   1 mg at 08/01/24 0911    senna-docusate (SENOKOT-S/PERICOLACE) 8.6-50 MG per tablet 1 tablet  1 tablet Oral BID Mingo Sanchez MD   1 tablet at 08/01/24 0911    sodium chloride (PF)  "0.9% PF flush 3 mL  3 mL Intracatheter Q8H Mingo Sanchez MD   3 mL at 08/01/24 1005    sodium chloride (PF) 0.9% PF flush 3 mL  3 mL Intracatheter Q8H Ben De Oliveira MD   3 mL at 08/01/24 0607    [Held by provider] torsemide (DEMADEX) tablet 20 mg  20 mg Oral Daily Ben De Oliveira MD        traZODone (DESYREL) tablet 100 mg  100 mg Oral At Bedtime Ben De Oliveira MD   100 mg at 07/31/24 2346    umeclidinium (INCRUSE ELLIPTA) 62.5 MCG/ACT inhaler 1 puff  1 puff Inhalation QAM Ben De Oliveira MD   1 puff at 08/01/24 0925       Data   All microbiology laboratory data reviewed.  Recent Labs   Lab Test 08/01/24  0713 07/31/24  0636 08/30/20  0705 08/29/20  0640   WBC 8.0 7.1  --  8.4   HGB 10.0* 11.0* 10.3* 11.3*   HCT 31.2* 32.3*  --  33.7*   * 99  --  97    199  --  214     Recent Labs   Lab Test 08/01/24  0713 07/31/24  0636 08/29/20  0640   CR 1.13* 1.03* 0.86     No lab results found.  No lab results found.    Invalid input(s): \"UC\"    All cultures:  Recent Labs   Lab 07/31/24  1655 07/31/24  1653 07/31/24  1633 07/31/24  0636   CULTURE See corresponding culture for results  See corresponding culture for results  See corresponding culture for results See corresponding culture for results See corresponding culture for results No growth after 1 day      Blood culture:  Results for orders placed or performed during the hospital encounter of 07/30/24   Blood Culture Arm, Right    Specimen: Arm, Right; Blood   Result Value Ref Range    Culture No growth after 1 day    Results for orders placed or performed in visit on 12/10/09   Blood culture   Result Value Ref Range    Specimen Description Blood SITE NOT SPECIFIED     Culture Micro No growth after 6 days     Micro Report Status FINAL [id number]       Urine culture:  Results for orders placed or performed in visit on 12/09/09   Urine culture   Result Value Ref Range    Specimen Description Catheterized Urine     " Culture Micro No growth     Micro Report Status FINAL 12/11/2009

## 2024-08-01 NOTE — ANESTHESIA CARE TRANSFER NOTE
Patient: Helga Geronimo    Procedure: Procedure(s):  Irrigation and debridement with polyethylene exchange       Diagnosis: Infection of prosthetic joint, initial encounter (H24) [T84.50XA]  Diagnosis Additional Information: No value filed.    Anesthesia Type:   General     Note:    Oropharynx: oropharynx clear of all foreign objects and spontaneously breathing  Level of Consciousness: drowsy  Oxygen Supplementation: face mask  Level of Supplemental Oxygen (L/min / FiO2): 6  Independent Airway: airway patency satisfactory and stable  Dentition: dentition unchanged  Vital Signs Stable: post-procedure vital signs reviewed and stable  Report to RN Given: handoff report given  Patient transferred to: PACU    Handoff Report: Identifed the Patient, Identified the Reponsible Provider, Reviewed the pertinent medical history, Discussed the surgical course, Reviewed Intra-OP anesthesia mangement and issues during anesthesia, Set expectations for post-procedure period and Allowed opportunity for questions and acknowledgement of understanding      Vitals:  Vitals Value Taken Time   /83 07/31/24 1859   Temp     Pulse 87 07/31/24 1901   Resp 17 07/31/24 1901   SpO2 95 % 07/31/24 1901   Vitals shown include unfiled device data.    Electronically Signed By: CHRIS Graham CRNA  July 31, 2024  7:03 PM

## 2024-08-01 NOTE — PROGRESS NOTES
Alert and oriented x 4. VSS, O2 weaned from 4lpm/NC from PACU to 1 lpm/NC.   CMS intact. Dressing to LLE intact, KI on at all times. Voiding adequately. Pain managed with Toradol and tylenol. Slept most of the shift. Assist of one with turning in bed. PIV francis UE, IV abx. Mepilex to R elbow, R LLE, coccyx intact. Hemovac with 40 ml sanguinous drainage. Prevena WV in place, no o/p. Discharge TBD.

## 2024-08-01 NOTE — PROGRESS NOTES
08/01/24 1010   Appointment Info   Signing Clinician's Name / Credentials (PT) Anjelica Tsai PT   Rehab Comments (PT) Orders are for KI at all times; also orders were marked for ROM 0 degrees to tolerance flexion; left message for care coordinator to clarify w/ request to tell nsg. 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 Uses a SEC in hallway at home. Walk in shower with seat but doesn't typically sit.   General Information   Onset of Illness/Injury or Date of Surgery 07/30/24   Referring Physician Mingo Sanchez W. MD   Patient/Family Therapy Goals Statement (PT) Wants to decrease pain   Pertinent History of Current Problem (include personal factors and/or comorbidities that impact the POC) Helga Geronimo is a 74 year old female with a history of hypertension, hyperlipidemia, chronic kidney disease, COPD, depression, anxiety, bilateral lower extremity lymphedema, and morbid obesity who transferred from [facility] to [facility] on 07/30/2024 due to bacteremia and left TKA prosthetic joint infection.    Polyethlene exchange on 7/31.   Existing Precautions/Restrictions fall   Weight-Bearing Status - LLE weight-bearing as tolerated   General Observations Lying in bed. on 2L and capno reporting 85%. Per nsg. to check on wall monitor; pt. at 96% including on RA; HR in 80's   Cognition   Affect/Mental Status (Cognition) WNL   Orientation Status (Cognition) oriented x 4   Follows Commands (Cognition) follows multi-step commands;75-90% accuracy   Pain Assessment   Patient Currently in Pain Yes, see Vital Sign flowsheet  (9/10 L L/E)   Integumentary/Edema    Integumentary/Edema Comments Chronic edema noted in R L/E; L with KI   Posture    Posture Forward head position;Kyphosis   Range of Motion (ROM)   ROM Comment Deferred L L/E until clarification: KI on at all times. Rest of extremeties appear functional with end range limitations   Strength (Manual Muscle Testing)   Strength (Manual Muscle Testing) Deficits observed during functional mobility   Bed Mobility   Comment, (Bed Mobility) Supine to sit with Mod of 2   Transfers   Comment, (Transfers) STS with Mod of 2   Gait/Stairs (Locomotion)   Comment, (Gait/Stairs) Pt. took 2 steps for eval with each foot using WW and Mod plus Min   Balance   Balance Comments Compromised in stance; requiring Mod plus Min inside of WW   Sensory Examination   Sensory Perception patient reports no sensory changes   Coordination   Coordination no deficits were identified   Muscle Tone   Muscle Tone no deficits were identified   Clinical Impression   Criteria for Skilled Therapeutic Intervention Yes, treatment indicated   PT Diagnosis (PT) Impaired functional mobility   Influenced by the following impairments pain, immobility, KI on L, weakness   Functional limitations due to impairments decreased I and endurance in functional mobility   Clinical Presentation (PT Evaluation Complexity) stable   Clinical Presentation Rationale per clinical judgement   Clinical Decision Making (Complexity) low complexity   Planned Therapy Interventions (PT) balance training;bed mobility training;gait training;patient/family education;strengthening;transfer training;progressive activity/exercise   Risk & Benefits of therapy have been explained evaluation/treatment results reviewed;care plan/treatment goals reviewed;risks/benefits reviewed;current/potential barriers reviewed;participants voiced agreement with care plan;participants included;patient   PT Total Evaluation Time   PT Eval, Low Complexity Minutes (69705) 10   Physical Therapy Goals   PT Frequency  2x/day   PT Predicted Duration/Target Date for Goal Attainment 08/06/24   PT: Bed Mobility Supervision/stand-by assist;Supine to/from sit   PT: Transfers Supervision/stand-by assist;Sit to/from stand;Bed to/from chair;Assistive device   PT: Gait Supervision/stand-by assist;Rolling walker;100 feet   Therapeutic Activity   Therapeutic Activities: dynamic activities to improve functional performance Minutes (90468) 33   Symptoms Noted During/After Treatment Fatigue;Increased pain   Treatment Detail/Skilled Intervention Patient supine in bed; agreeable to participate; noteing L knee pain. Reporting physician told daughter COURT to be on at all times; in notes as well. ROM also marked in orders but waiting for clarification from PT (left message). Patient moved supine to sit with strong Mod of 2; assist to move B L/E to EOB. CGA in sit. Complained of L knee pain at 9/10. STS with Max plus Mod and unable to come to stance fully; returned to sit. Lowered WW. STS with strong Mod of 2 to come into stance in WW. Pre-gait weight shifting with Mod plus Min; adjusting for standing balance. Able to initiate gait training with small steps up to 6 bilaterally including turn to position for recliner; recliner brought up to pt. Sat with Mod plus Min. Difficult to scoot back; requiring Max of 2. Positioned with pillows and chair alarm. Notified nsg.; may need lift back. Educated on and performed ankle pumps and quad sets. O2 on RA at 96% and HR approx. 85 throughout session; no dizziness.   PT Discharge Planning   PT Plan transfers, progress gait with wc follow, need second person, reinforce AP's and quad sets, look for clarification for ROM; defer until clear   PT Discharge Recommendation (DC Rec) Transitional Care Facility   PT Rationale for DC Rec Patient lives [place] at baseline. Currently requiring strong Mod of 2 to Max plus Mod. Taking a few steps with A only. Limited by pain, weakness, and deconditioning. Recommend continued PT at  TCU to advance functonal mobiity   PT Brief overview of current status Mod of 2 for most of mobility, took up to 6 small steps with WW and Mod   PT Equipment Needed at Discharge   (has WW)   Total Session Time   Timed Code Treatment Minutes 33   Total Session Time (sum of timed and untimed services) 43

## 2024-08-01 NOTE — PLAN OF CARE
VSS, CMS intact. Up with 2 and sonya mccallum. Knee immobilizer on at all times. Pain managed with oxycodone, tylenol and toradol. L knee dressing C/DI. Right leg and elbow dressing changed. Powder to abdominal fold and barrier cream and mepilex to coccyx. Incontinent of bladder, . A&OX4.

## 2024-08-01 NOTE — PROGRESS NOTES
[facility]  Hospitalist Progress Note    Assessment & Plan   Helga Geronimo is a 74 year old female who [facility] due to bacteremia and left TKA prosthetic joint infection.     Past medical history significant for HTN, HLP, CKD stage III, COPD, MDD with anxiety, Obesity, Bilateral lower extremity lymphedema.       Pasteurella bacteremia  Left TKA prosthetic joint infection s/p left knee irrigation and debridement with polyethylene exchange and Pravena Wound Vac placement (14 days)  Left lower extremity cellulitis  Bilateral lower extremity lymphedema  Patient slipped and fell prior to admission. Developed left knee pain after the fall. Had erythema of left knee/leg at time of admission. History of bilateral lower extremity lymphedema and wounds. Blood cultures from [facility] positive for Pasteurella. S/p left knee arthrocentesis - culture pending, cell count with 68,000 nucleated cells (94% neutrophils). Transferred from [facility] to [facility] on 7/30/24 for further evaluation and management by Dr. Yu, who is the patient's primary Orthopedic Surgeon that performed her initial left TKA in August 2020.  *Left knee aspirate with monosodium urates present.    Orthopedic surgery consult appreciated:  Pain management and DVT prophylaxis (ASA 81 mg BID) per Ortho.    PHOEBE drain in place with plan to remove 8/2.    Infectious Disease  appreciated:  Continue Unasyn 3 g every 6 hours, await intraoperative cultures.  Order placed to obtain outpatient records (specifically cultures of left knee aspiration).    PT/OT consults appreciated:  Recommending TCU.  SW/CM consult requested for discharge planning.    Chronic lower extremity wounds  Right elbow wound secondary to pressure, friction and trauma  Left cheek and eye wound (traumatic)  Coccyx injury secondary to friction, intertrigo, and MASD  [facility] nurse consult appreciated.  Please see noted  "from 7/31.       Rhabdomyolysis (Resolving)  Fall with prolonged time on floor  Secondary to fall and laying on the floor overnight.    *CK: 9,563-->5,334-->2,435-->711  Continue IVF and monitor urine output.     Hypertension  Continue PTA olmesartan-hydrochlorothiazide.  Hold PTA torsemide.  Potentially restart 8/2.       Hyperlipidemia  *Prior history of significant myalgias with statins.  Continued on PTA ezetimibe.    Chronic kidney disease, stage 3  Creatinine stable at baseline in the ER at 1.53 on 7/28/2024.  Creatinine stable at 1.41 on 7/29/2024.  BMP ordered 08/02/24.  Avoid nephrotoxic medications as able.     COPD  Denies shortness of breath, wheezing.  Continued on PTA Symbicort or formulary equivalent.  Continued on PTA Spiriva.  Albuterol neb therapy available PRN.       MDD with anxiety  Insomnia  Continued on PTA fluoxetine, Risperdal, and trazodone.     Morbid obesity  BMI 43.89 kg/m2 per outside records.  Increases all cause mortality.  Follow up with PCP regarding long term management.    Anemia, mild   Monitor.      Mitral stenosis  Elevated pulmonary pressures  Outpatient follow up.      Clinically Significant Risk Factors              # Hypoalbuminemia: Lowest albumin = 2.9 g/dL at 7/31/2024  6:36 AM, will monitor as appropriate           # Severe Obesity: Estimated body mass index is 43.77 kg/m  as calculated from the following:    Height as of this encounter: 1.626 m (5' 4\").    Weight as of this encounter: 115.7 kg (255 lb)., PRESENT ON ADMISSION            Diet: Advance Diet as Tolerated: Regular Diet Adult     DVT Prophylaxis: Pneumatic Compression Devices and ASA 81 mg BID   Fuchs Catheter: Not present  Lines: None     Cardiac Monitoring: None  Code Status: Full Code      Disposition Plan    Inpatient status.  Anticipate greater than 2 evening hospitalization while undergoing continued work-up/management of Pasteurella bacteremia and Left TKA prosthetic joint infection s/p left knee " "irrigation and debridement with polyethylene exchange.      Medically Ready for Discharge: Anticipated in 2-4 Days      The patient's care was discussed with the Bedside Nurse, Patient, and Ortho (Yola Hennessy PA-C) and ID (Dr. Mills) .  Reviewed Hospitalist progress note 7/31, ID consult note, WOC nurse consult, Orthopedic consult, and Op note, PT note.      The patient has been discussed with Dr. Oliver, who agrees with the assessment and plan at this time.    Boris Duran PA-C  [facility]  Securely message with the Vocera Web Console (learn more here)      Interval History   Prior to assessing patient discussed plan of care with Ortho, ID and bedside nurse.  Patient was resting in a chair upon arrival.  She was watching TV.  She denied fever, chills, chest pain, abdominal pain or change urinating or with bowel movements.  She did state she felt slightly short of breath and asked about an albuterol inhaler.  We spoke about an albuterol neb treatment.      We reviewed that patient fell this past weekend.  We discussed that she had surgery yesterday and is currently on antibiotics.      -Data reviewed today: I reviewed all new labs and imaging results over the last 24 hours. I personally reviewed no images or EKG's today.    Physical Exam   /63 (BP Location: Right arm)   Pulse 86   Temp 97.8  F (36.6  C) (Oral)   Resp 16   Ht 1.626 m (5' 4\")   Wt 115.7 kg (255 lb)   SpO2 95%   BMI 43.77 kg/m      Constitutional: Awake, alert, cooperative, no apparent distress.    ENT: Left cheek and eyelid abrasion noted otherwise normocephalic, without obvious abnormality, atraumatic, oral pharynx with moist mucus membranes, tonsils without erythema or exudates.  Neck: Supple, symmetrical, trachea midline, no adenopathy.  Pulmonary: No increased work of breathing, fair air exchange, clear to auscultation bilaterally, no crackles or wheezing.  Cardiovascular: Regular rate and " rhythm, normal S1 and S2, no S3 or S4, and no murmur noted.  GI: Normal bowel sounds, soft, non-distended, non-tender. Obese.  Skin/Integumen: Abrasions noted on Left cheek and eyelid.  Neuro: CN II-XII grossly intact.  Psych:  Alert and oriented x 3. Normal affect.  Extremities: Right lower extremity edema noted and compression stocking in place.  Left lower extremity in a knee immobilizing brace.       Medical Decision Making                Medications   Current Facility-Administered Medications   Medication Dose Route Frequency Provider Last Rate Last Admin    lactated ringers infusion   Intravenous Continuous Mingo Sanchez MD 75 mL/hr at 07/31/24 2250 New Bag at 07/31/24 2250     Current Facility-Administered Medications   Medication Dose Route Frequency Provider Last Rate Last Admin    acetaminophen (TYLENOL) tablet 975 mg  975 mg Oral Q8H Mingo Sanchez MD   975 mg at 08/01/24 0603    ampicillin-sulbactam (UNASYN) 3 g vial to attach to  mL bag  3 g Intravenous Q6H Ben De Oliveira MD   3 g at 08/01/24 0443    aspirin EC tablet 81 mg  81 mg Oral BID Mingo Sanchez MD   81 mg at 07/31/24 2347    ezetimibe (ZETIA) tablet 10 mg  10 mg Oral Daily Ben De Oliveira MD   10 mg at 07/31/24 0826    FLUoxetine (PROzac) capsule 40 mg  40 mg Oral BID Ben De Oliveira MD   40 mg at 07/31/24 0833    fluticasone-vilanterol (BREO ELLIPTA) 100-25 MCG/ACT inhaler 1 puff  1 puff Inhalation Daily Ben De Oliveira MD   1 puff at 07/31/24 0830    gabapentin (NEURONTIN) capsule 900 mg  900 mg Oral TID Ben De Oliveira MD   900 mg at 07/31/24 2248    ketorolac (TORADOL) injection 15 mg  15 mg Intravenous Q6H Mingo Sanchez MD   15 mg at 08/01/24 0443    losartan-hydrochlorothiazide (HYZAAR) 50-12.5 MG per tablet 1 tablet  1 tablet Oral Daily Ben De Oliveira MD   1 tablet at 07/31/24 0827    miconazole (MICATIN) 2 % powder   Topical BID Ben De Oliveira MD    Given at 07/31/24 0829    polyethylene glycol (MIRALAX) Packet 17 g  17 g Oral Daily Mingo Sanchez MD        risperiDONE (risperDAL) tablet 1 mg  1 mg Oral TID Ben De Oliveira MD   1 mg at 07/31/24 2248    senna-docusate (SENOKOT-S/PERICOLACE) 8.6-50 MG per tablet 1 tablet  1 tablet Oral BID Mingo Sanchez MD   1 tablet at 07/31/24 2346    sodium chloride (PF) 0.9% PF flush 3 mL  3 mL Intracatheter Q8H Mingo Sanchez MD        sodium chloride (PF) 0.9% PF flush 3 mL  3 mL Intracatheter Q8H Ben De Oliveira MD   3 mL at 08/01/24 0607    [Held by provider] torsemide (DEMADEX) tablet 20 mg  20 mg Oral Daily Ben De Oliveira MD        traZODone (DESYREL) tablet 100 mg  100 mg Oral At Bedtime Ben De Oliveira MD   100 mg at 07/31/24 2346    umeclidinium (INCRUSE ELLIPTA) 62.5 MCG/ACT inhaler 1 puff  1 puff Inhalation QAM Ben De Oliveira MD   1 puff at 07/31/24 0833       Data   Recent Labs   Lab 08/01/24  0713 07/31/24  0636   WBC 8.0 7.1   HGB 10.0* 11.0*   * 99    199    136   POTASSIUM 4.8 4.0   CHLORIDE 102 99   CO2 29 28   BUN 18.4 18.9   CR 1.13* 1.03*   ANIONGAP 7 9   BRAYAN 8.1* 8.6*   * 98   ALBUMIN  --  2.9*   PROTTOTAL  --  6.2*   BILITOTAL  --  0.4   ALKPHOS  --  60   ALT  --  51*   AST  --  99*       Recent Results (from the past 24 hour(s))   XR Knee Port Left 1/2 Views    Narrative    EXAM: XR KNEE PORT LEFT 1/2 VIEWS  LOCATION: [facility]  DATE: 7/31/2024    INDICATION: Post op total knee.  COMPARISON: None.      Impression    IMPRESSION: Left total knee arthroplasty. Excellent position and alignment of components. No evidence of complication or periprosthetic fracture.

## 2024-08-01 NOTE — ANESTHESIA POSTPROCEDURE EVALUATION
Patient: Helga Geronimo    Procedure: Procedure(s):  Irrigation and debridement with polyethylene exchange       Anesthesia Type:  General    Note:  Disposition: Admission   Postop Pain Control: Uneventful            Sign Out: Well controlled pain   PONV: No   Neuro/Psych: Uneventful            Sign Out: Acceptable/Baseline neuro status   Airway/Respiratory: Uneventful            Sign Out: Acceptable/Baseline resp. status   CV/Hemodynamics: Uneventful            Sign Out: Acceptable CV status; No obvious hypovolemia; No obvious fluid overload   Other NRE: NONE   DID A NON-ROUTINE EVENT OCCUR? No           Last vitals:  Vitals Value Taken Time   /78 07/31/24 2100   Temp 36.5  C (97.7  F) 07/31/24 1930   Pulse 93 07/31/24 2110   Resp 16 07/31/24 2110   SpO2 97 % 07/31/24 2110   Vitals shown include unfiled device data.    Electronically Signed By: Nilam Duarte MD  July 31, 2024  10:28 PM

## 2024-08-02 ENCOUNTER — APPOINTMENT (OUTPATIENT)
Dept: PHYSICAL THERAPY | Facility: CLINIC | Age: 74
DRG: 486 | End: 2024-08-02
Attending: INTERNAL MEDICINE
Payer: COMMERCIAL

## 2024-08-02 LAB
ANION GAP SERPL CALCULATED.3IONS-SCNC: 6 MMOL/L (ref 7–15)
BUN SERPL-MCNC: 22.1 MG/DL (ref 8–23)
CALCIUM SERPL-MCNC: 8.2 MG/DL (ref 8.8–10.4)
CHLORIDE SERPL-SCNC: 104 MMOL/L (ref 98–107)
CREAT SERPL-MCNC: 1.19 MG/DL (ref 0.51–0.95)
EGFRCR SERPLBLD CKD-EPI 2021: 48 ML/MIN/1.73M2
GLUCOSE SERPL-MCNC: 106 MG/DL (ref 70–99)
HCO3 SERPL-SCNC: 29 MMOL/L (ref 22–29)
HGB BLD-MCNC: 9.9 G/DL (ref 11.7–15.7)
POTASSIUM SERPL-SCNC: 4.2 MMOL/L (ref 3.4–5.3)
SODIUM SERPL-SCNC: 139 MMOL/L (ref 135–145)

## 2024-08-02 PROCEDURE — 80048 BASIC METABOLIC PNL TOTAL CA: CPT | Performed by: PHYSICIAN ASSISTANT

## 2024-08-02 PROCEDURE — 99232 SBSQ HOSP IP/OBS MODERATE 35: CPT | Performed by: INTERNAL MEDICINE

## 2024-08-02 PROCEDURE — 36415 COLL VENOUS BLD VENIPUNCTURE: CPT | Performed by: ORTHOPAEDIC SURGERY

## 2024-08-02 PROCEDURE — 85018 HEMOGLOBIN: CPT | Performed by: ORTHOPAEDIC SURGERY

## 2024-08-02 PROCEDURE — 120N000001 HC R&B MED SURG/OB

## 2024-08-02 PROCEDURE — 97530 THERAPEUTIC ACTIVITIES: CPT | Mod: GP | Performed by: PHYSICAL THERAPY ASSISTANT

## 2024-08-02 PROCEDURE — 250N000013 HC RX MED GY IP 250 OP 250 PS 637: Performed by: ORTHOPAEDIC SURGERY

## 2024-08-02 PROCEDURE — 250N000011 HC RX IP 250 OP 636: Performed by: HOSPITALIST

## 2024-08-02 PROCEDURE — 999N000111 HC STATISTIC OT IP EVAL DEFER

## 2024-08-02 PROCEDURE — 250N000013 HC RX MED GY IP 250 OP 250 PS 637: Performed by: HOSPITALIST

## 2024-08-02 PROCEDURE — 99232 SBSQ HOSP IP/OBS MODERATE 35: CPT | Performed by: PHYSICIAN ASSISTANT

## 2024-08-02 RX ADMIN — SENNOSIDES AND DOCUSATE SODIUM 1 TABLET: 8.6; 5 TABLET ORAL at 08:19

## 2024-08-02 RX ADMIN — ASPIRIN 81 MG: 81 TABLET, COATED ORAL at 08:18

## 2024-08-02 RX ADMIN — RISPERIDONE 1 MG: 1 TABLET ORAL at 15:55

## 2024-08-02 RX ADMIN — FLUOXETINE HYDROCHLORIDE 40 MG: 20 CAPSULE ORAL at 08:18

## 2024-08-02 RX ADMIN — GABAPENTIN 900 MG: 300 CAPSULE ORAL at 08:19

## 2024-08-02 RX ADMIN — FLUTICASONE FUROATE AND VILANTEROL 1 PUFF: 100; 25 POWDER RESPIRATORY (INHALATION) at 08:25

## 2024-08-02 RX ADMIN — SENNOSIDES AND DOCUSATE SODIUM 1 TABLET: 8.6; 5 TABLET ORAL at 21:23

## 2024-08-02 RX ADMIN — RISPERIDONE 1 MG: 1 TABLET ORAL at 08:18

## 2024-08-02 RX ADMIN — HYDROMORPHONE HYDROCHLORIDE 2 MG: 2 TABLET ORAL at 21:22

## 2024-08-02 RX ADMIN — AMPICILLIN SODIUM AND SULBACTAM SODIUM 3 G: 2; 1 INJECTION, POWDER, FOR SOLUTION INTRAMUSCULAR; INTRAVENOUS at 10:20

## 2024-08-02 RX ADMIN — AMPICILLIN SODIUM AND SULBACTAM SODIUM 3 G: 2; 1 INJECTION, POWDER, FOR SOLUTION INTRAMUSCULAR; INTRAVENOUS at 21:17

## 2024-08-02 RX ADMIN — AMPICILLIN SODIUM AND SULBACTAM SODIUM 3 G: 2; 1 INJECTION, POWDER, FOR SOLUTION INTRAMUSCULAR; INTRAVENOUS at 04:06

## 2024-08-02 RX ADMIN — POLYETHYLENE GLYCOL 3350 17 G: 17 POWDER, FOR SOLUTION ORAL at 08:19

## 2024-08-02 RX ADMIN — TRAZODONE HYDROCHLORIDE 100 MG: 100 TABLET ORAL at 21:23

## 2024-08-02 RX ADMIN — GABAPENTIN 900 MG: 300 CAPSULE ORAL at 15:55

## 2024-08-02 RX ADMIN — ACETAMINOPHEN 975 MG: 325 TABLET, FILM COATED ORAL at 21:22

## 2024-08-02 RX ADMIN — HYDROMORPHONE HYDROCHLORIDE 2 MG: 2 TABLET ORAL at 05:42

## 2024-08-02 RX ADMIN — ACETAMINOPHEN 975 MG: 325 TABLET, FILM COATED ORAL at 13:37

## 2024-08-02 RX ADMIN — RISPERIDONE 1 MG: 1 TABLET ORAL at 21:22

## 2024-08-02 RX ADMIN — FLUOXETINE HYDROCHLORIDE 40 MG: 20 CAPSULE ORAL at 21:23

## 2024-08-02 RX ADMIN — GABAPENTIN 900 MG: 300 CAPSULE ORAL at 21:22

## 2024-08-02 RX ADMIN — LOSARTAN POTASSIUM AND HYDROCHLOROTHIAZIDE 1 TABLET: 50; 12.5 TABLET, FILM COATED ORAL at 08:25

## 2024-08-02 RX ADMIN — HYDROMORPHONE HYDROCHLORIDE 2 MG: 2 TABLET ORAL at 14:40

## 2024-08-02 RX ADMIN — MICONAZOLE NITRATE: 2 POWDER TOPICAL at 21:23

## 2024-08-02 RX ADMIN — EZETIMIBE 10 MG: 10 TABLET ORAL at 08:19

## 2024-08-02 RX ADMIN — MICONAZOLE NITRATE: 2 POWDER TOPICAL at 08:18

## 2024-08-02 RX ADMIN — ACETAMINOPHEN 975 MG: 325 TABLET, FILM COATED ORAL at 05:42

## 2024-08-02 RX ADMIN — ASPIRIN 81 MG: 81 TABLET, COATED ORAL at 21:22

## 2024-08-02 RX ADMIN — AMPICILLIN SODIUM AND SULBACTAM SODIUM 3 G: 2; 1 INJECTION, POWDER, FOR SOLUTION INTRAMUSCULAR; INTRAVENOUS at 15:55

## 2024-08-02 ASSESSMENT — ACTIVITIES OF DAILY LIVING (ADL)
ADLS_ACUITY_SCORE: 42
ADLS_ACUITY_SCORE: 44
ADLS_ACUITY_SCORE: 42
ADLS_ACUITY_SCORE: 42
ADLS_ACUITY_SCORE: 44
ADLS_ACUITY_SCORE: 42
ADLS_ACUITY_SCORE: 44
ADLS_ACUITY_SCORE: 42
ADLS_ACUITY_SCORE: 44

## 2024-08-02 NOTE — PROGRESS NOTES
Spoke with Mp in lab at [facility] regarding aspirate of knee done at their hospital.  States will fax over results - fax number given. Chap[facility] VIDAL informed in person.     Results received - placed on front of chart, paged ID & updated [facility] VIDAL

## 2024-08-02 NOTE — PROGRESS NOTES
Orthopedic Surgery  Helga Geronimo  08/02/2024     Admit Date:  7/30/2024  POD: 2 Days Post-Op   Procedure(s):  Irrigation and debridement with polyethylene exchange Left TKA     Patient resting in bed this morning.  Breathing comfortably on room air.  He is to be doing well postoperatively.  No active concerns of pain, but expect postoperative pain within expectations postsurgically and will continue pain regimen.  Tolerating oral intake.    Denies nausea or vomiting  Denies chest pain or shortness of breath    Temp:  [97.6  F (36.4  C)-98.1  F (36.7  C)] 97.6  F (36.4  C)  Pulse:  [78-89] 82  Resp:  [16] 16  BP: (120-147)/(61-79) 147/79  SpO2:  [93 %-94 %] 94 %    Alert and oriented  Dressing is clean, dry, and intact. Prevena incisional vac in place and functioning.   Hemovac in place (40 ml output)   Ace wrap in place.   Bilateral calves are soft, non-tender.  Left lower extremity is NVI.  Sensation intact bilateral lower extremities  Patient able to resist dorsi and plantar flexion bilaterally  +Dp pulse    Labs:  Recent Labs   Lab Test 08/02/24  0733 08/01/24  0713 07/31/24  0636 08/30/20  0705 08/29/20  0640   WBC  --  8.0 7.1  --  8.4   HGB 9.9* 10.0* 11.0*   < > 11.3*   PLT  --  190 199  --  214    < > = values in this interval not displayed.     No lab results found.  Recent Labs   Lab Test 07/31/24  0636   CRPI 203.30*     Knee cultures:  Gram stain: no orgs  Cultures.  No growth today    1. Plan:  Activity: WBAT with assist, KI at all times x 2 weeks   Dressing + changes: Prevena wound vac x 14 days. Keep prevena in place.    Drain: PHOEBE Drain on left lateral thigh/knee: remove POD #2 or when <30q shift   DVT proph: Aspirin 81mg bid   Abx: Per ID  Dispo: TCU  F/U: 2 weeks w/ Dr. Sanchez     2. Disposition   Anticipate d/c to TCU when Abx plan established, medically cleared and progressing in PT.    Nidia Grant PA-C

## 2024-08-02 NOTE — PROGRESS NOTES
Alert and oriented x 4. VSS, room air. CMS intact. Knee immobilizer on at all times, dressing CDI. Hemovac o/p 15 ml, Prevena wound vac in place no o/p.  Pain managed with scheduled Tylenol and Prn Dilaudid. Assist of one with T/R in bed. Incontinent of bladder. Assist of 2 with sonya steady for transfers. Discharge TBD.

## 2024-08-02 NOTE — PROGRESS NOTES
Care Management Follow Up    Length of Stay (days): 3    Expected Discharge Date: 08/03/2024     Concerns to be Addressed:       Patient plan of care discussed at interdisciplinary rounds: Yes    Anticipated Discharge Disposition: Transitional Care     Anticipated Discharge Services:    Anticipated Discharge DME: [facility]    Patient/family educated on Medicare website which has current facility and service quality ratings: yes  Education Provided on the Discharge Plan: Yes  Patient/Family in Agreement with the Plan: yes    Referrals Placed by CM/SW: Post Acute Facilities  Private pay costs discussed:  NA    Additional Information:  Call placed to [facility] for update on referral status. Message left.     1615: [facility] has not returned call. Writer sent additional referrals near patient's home in [place]. Referrals sent to [facility], [facility] and [facility] via [facility].    APPLE Bautista

## 2024-08-02 NOTE — PLAN OF CARE
Occupational Therapy: Orders received. Chart reviewed and discussed with care team.? Occupational Therapy not indicated due to per PT, pt requiring heavy assist and will need TCU. PT following, will defer OT to limit duplication of services. Defer OT needs to next level of care.? Defer discharge recommendations to care team.? Will complete orders.

## 2024-08-02 NOTE — PROGRESS NOTES
[facility]    Infectious Disease Progress Note    Date of Service (when I saw the patient): 08/02/2024     Assessment & Plan   Helga Geronimo is a 74 year old female who was admitted on 7/30/2024.     mpression:  75 yo admitted with transferred from [facility] on July 30, 2024 due to a left prosthetic joint infection after a fall and inability to get up   History of total knee arthroplasty many yrs ago   The blood cultures were positive for Pasteurella.    She has 1 cat but no history of a bite   The aspiration from the left knee was significant for a total nucleated cell count of 68,000 cells with 94% neutrophils.  Cultures from the aspiration are pending.    She is subsequently transferred to [facility] for further evaluation and management   She is currently on Unasyn  S/p left knee I&D with poly exchange  And stain here noted for gram-positive cocci in broth only     Recommendations:  Continue on Unasyn  Follow-up on the blood cultures done here  Follow-up on the OR cultures  Follow up on the aspiration culture from the knee from OSH   Discussed with KAYLYN Mills MD    Interval History   Tolerating antibiotics ok   New changes  No fever    Physical Exam   Temp: 97.6  F (36.4  C) Temp src: Oral BP: (!) 147/79 Pulse: 82   Resp: 16 SpO2: 94 % O2 Device: None (Room air)    Vitals:    07/31/24 1514   Weight: 115.7 kg (255 lb)     Vital Signs with Ranges  Temp:  [97.6  F (36.4  C)-98.1  F (36.7  C)] 97.6  F (36.4  C)  Pulse:  [78-89] 82  Resp:  [16] 16  BP: (120-147)/(61-79) 147/79  SpO2:  [93 %-94 %] 94 %    Constitutional: Awake, alert, cooperative, no apparent distress  Lungs: Clear to auscultation bilaterally, no crackles or wheezing  Cardiovascular: Regular rate and rhythm, normal S1 and S2, and no murmur noted  Abdomen: Normal bowel sounds, soft, non-distended, non-tender  Skin: No rashes, no cyanosis, no edema  Other:    Medications   Current  Facility-Administered Medications   Medication Dose Route Frequency Provider Last Rate Last Admin    lactated ringers infusion   Intravenous Continuous Mingo Sanchez MD 75 mL/hr at 07/31/24 2250 New Bag at 07/31/24 2250     Current Facility-Administered Medications   Medication Dose Route Frequency Provider Last Rate Last Admin    acetaminophen (TYLENOL) tablet 975 mg  975 mg Oral Q8H Mingo Sanchez MD   975 mg at 08/02/24 0542    ampicillin-sulbactam (UNASYN) 3 g vial to attach to  mL bag  3 g Intravenous Q6H Ben De Oliveira MD   3 g at 08/02/24 1020    aspirin EC tablet 81 mg  81 mg Oral BID Mingo Sanchez MD   81 mg at 08/02/24 0818    ezetimibe (ZETIA) tablet 10 mg  10 mg Oral Daily Ben De Oliveira MD   10 mg at 08/02/24 0819    FLUoxetine (PROzac) capsule 40 mg  40 mg Oral BID Ben De Oliveira MD   40 mg at 08/02/24 0818    fluticasone-vilanterol (BREO ELLIPTA) 100-25 MCG/ACT inhaler 1 puff  1 puff Inhalation Daily Ben De Oliveira MD   1 puff at 08/02/24 0825    gabapentin (NEURONTIN) capsule 900 mg  900 mg Oral TID Ben De Oliveira MD   900 mg at 08/02/24 0819    losartan-hydrochlorothiazide (HYZAAR) 50-12.5 MG per tablet 1 tablet  1 tablet Oral Daily Ben De Oliveira MD   1 tablet at 08/02/24 0825    miconazole (MICATIN) 2 % powder   Topical BID Ben De Oliveira MD   Given at 08/02/24 0818    polyethylene glycol (MIRALAX) Packet 17 g  17 g Oral Daily Mingo Sanchez MD   17 g at 08/02/24 0819    risperiDONE (risperDAL) tablet 1 mg  1 mg Oral TID Ben De Oliveira MD   1 mg at 08/02/24 0818    senna-docusate (SENOKOT-S/PERICOLACE) 8.6-50 MG per tablet 1 tablet  1 tablet Oral BID Mingo Sanchez MD   1 tablet at 08/02/24 0819    sodium chloride (PF) 0.9% PF flush 3 mL  3 mL Intracatheter Q8H Mingo Sanchez MD   3 mL at 08/02/24 0819    [Held by provider] torsemide (DEMADEX) tablet 20 mg  20 mg Oral Daily Eklof,  "Ben Teresa MD        traZODone (DESYREL) tablet 100 mg  100 mg Oral At Bedtime Ben De Oliveira MD   100 mg at 08/01/24 2129    umeclidinium (INCRUSE ELLIPTA) 62.5 MCG/ACT inhaler 1 puff  1 puff Inhalation QAM Ben De Oliveira MD   1 puff at 08/02/24 0825       Data   All microbiology laboratory data reviewed.  Recent Labs   Lab Test 08/02/24  0733 08/01/24  0713 07/31/24  0636 08/30/20  0705 08/29/20  0640   WBC  --  8.0 7.1  --  8.4   HGB 9.9* 10.0* 11.0*   < > 11.3*   HCT  --  31.2* 32.3*  --  33.7*   MCV  --  101* 99  --  97   PLT  --  190 199  --  214    < > = values in this interval not displayed.     Recent Labs   Lab Test 08/02/24  0733 08/01/24  0713 07/31/24  0636   CR 1.19* 1.13* 1.03*     No lab results found.  No lab results found.    Invalid input(s): \"UC\"    All cultures:  Recent Labs   Lab 07/31/24  1655 07/31/24  1653 07/31/24  1633 07/31/24  0636   CULTURE No growth after 1 day  No growth after 1 day  No growth after 1 day  No anaerobic organisms isolated after 1 day  No anaerobic organisms isolated after 1 day  No anaerobic organisms isolated after 1 day  See corresponding culture for results  See corresponding culture for results  See corresponding culture for results No growth after 1 day  No anaerobic organisms isolated after 1 day  See corresponding culture for results Culture in progress  Isolated in broth only Gram positive cocci*  No anaerobic organisms isolated after 1 day  See corresponding culture for results No growth after 2 days      Blood culture:  Results for orders placed or performed during the hospital encounter of 07/30/24   Blood Culture Arm, Right    Specimen: Arm, Right; Blood   Result Value Ref Range    Culture No growth after 2 days    Results for orders placed or performed in visit on 12/10/09   Blood culture   Result Value Ref Range    Specimen Description Blood SITE NOT SPECIFIED     Culture Micro No growth after 6 days     Micro " Report Status FINAL [id number]       Urine culture:  Results for orders placed or performed in visit on 12/09/09   Urine culture   Result Value Ref Range    Specimen Description Catheterized Urine     Culture Micro No growth     Micro Report Status FINAL 12/11/2009

## 2024-08-02 NOTE — PLAN OF CARE
Diagnosis:Left knee infection, bacteremia 7/31 I&D, poly exchange  POD#: 2  Mental Status:A&O x 4  Activity/dangle up with 2 sonya steady or pivot with 2  Diet: regular  Pain: scheduled tylenol  Fuchs/Voiding: taylor  Tele/Restraints/Iso: NA  02/LDA: JERSON  D/C Date: ?  Other Info: HV to be pulled by PA tomorrow, preveena vac, wounds to right elbow, coccyx, RLE, Left cheek

## 2024-08-02 NOTE — PLAN OF CARE
Ortho brief note:    I returned to patient's bedside to evaluate the Hemovac drain.  She is having minimal output at this time.  Postop day 2 today.  I have removed the Hemovac drain without complication.  Hemostasis achieved.  Reinforced the area with soft fluff and Tegaderm.

## 2024-08-02 NOTE — PROGRESS NOTES
[facility]  Hospitalist Progress Note    Assessment & Plan   Helag Geronimo is a 74 year old female who [facility] due to bacteremia and left TKA prosthetic joint infection.     Past medical history significant for HTN, HLP, CKD stage III, COPD, MDD with anxiety, Obesity, Bilateral lower extremity lymphedema.       Pasteurella bacteremia  Left TKA prosthetic joint infection s/p left knee irrigation and debridement with polyethylene exchange and Pravena Wound Vac placement (14 days)  Left lower extremity cellulitis  Bilateral lower extremity lymphedema  Patient slipped and fell prior to admission. Developed left knee pain after the fall. Had erythema of left knee/leg at time of admission. History of bilateral lower extremity lymphedema and wounds. Blood cultures from [facility] positive for Pasteurella. S/p left knee arthrocentesis - culture pending, cell count with 68,000 nucleated cells (94% neutrophils). Transferred from [facility] to [facility] on 7/30/24 for further evaluation and management by Dr. Yu, who is the patient's primary Orthopedic Surgeon that performed her initial left TKA in August 2020.  *Left knee aspirate with monosodium urates present.    Orthopedic surgery consult appreciated:  Pain management and DVT prophylaxis (ASA 81 mg BID) per Ortho.    PHOEBE drain in place with plan to remove 8/2.    Infectious Disease consult appreciated:  Continue Unasyn 3 g every 6 hours, await intraoperative cultures.  Order placed to obtain outpatient records (specifically cultures of left knee aspiration collected 7/29).    PT/OT consults appreciated:  Recommending TCU.  SW/CM consult appreciated:  Referral to [facility] sent.      Chronic lower extremity wounds  Right elbow wound secondary to pressure, friction and trauma  Left cheek and eye wound (traumatic)  Coccyx injury secondary to friction, intertrigo, and MASD  [facility] nurse  "consult appreciated.  Please see noted from 7/31.       Rhabdomyolysis (Resolving)  Fall with prolonged time on floor  Secondary to fall and laying on the floor overnight.    *CK: 9,563-->5,334-->2,435-->711.  Continue IVF and monitor urine output.     Hypertension  Continued on PTA olmesartan-hydrochlorothiazide.  Hold PTA torsemide.  Potentially restart 8/2.       Hyperlipidemia  *Prior history of significant myalgias with statins.  Continued on PTA ezetimibe.    Chronic kidney disease, stage 3  *Creatinine ranges between 1.10-1.62 since 2023.    BMP ordered 08/02/24.  Avoid nephrotoxic medications as able.     COPD  Denies shortness of breath, wheezing.  Continued on PTA Symbicort or formulary equivalent.  Continued on PTA Spiriva.  Albuterol inhaler available PRN.       MDD with anxiety  Insomnia  Continued on PTA fluoxetine, Risperdal, and trazodone.     Morbid obesity  BMI 43.89 kg/m2 per outside records.  Increases all cause mortality.  Follow up with PCP regarding long term management.    Anemia, mild   Monitor.      Mitral stenosis  Elevated pulmonary pressures  Outpatient follow up.      Heart murmur  *Patient has never been told she has a murmur.  She had an ECHO completed at [facility] 7/28.    No interventions.      Clinically Significant Risk Factors              # Hypoalbuminemia: Lowest albumin = 2.9 g/dL at 7/31/2024  6:36 AM, will monitor as appropriate           # Severe Obesity: Estimated body mass index is 43.77 kg/m  as calculated from the following:    Height as of this encounter: 1.626 m (5' 4\").    Weight as of this encounter: 115.7 kg (255 lb)., PRESENT ON ADMISSION            Diet: Advance Diet as Tolerated: Regular Diet Adult     DVT Prophylaxis: Pneumatic Compression Devices and ASA 81 mg BID   Fuchs Catheter: Not present  Lines: None     Cardiac Monitoring: None  Code Status: Full Code       Disposition Plan    Inpatient status.  Undergoing continued work-up/management of pasteurella " "bacteremia and Left TKA prosthetic joint infection.      Medically Ready for Discharge: Anticipated in 2-4 Days    The patient's care was discussed with the Bedside Nurse and Patient.      The patient has been discussed with Dr. Oliver, who agrees with the assessment and plan at this time.    DEVON Rosenberg [facility]  Securely message with the Vocera Web Console (learn more here)      Interval History   Patient was resting in bed upon arrival.  She denied fever, chills, chest pain, shortness of breath or abdominal pain.  We reviewed overall plan for the day.  She had no questions.      -Data reviewed today: I reviewed all new labs and imaging results over the last 24 hours. I personally reviewed no images or EKG's today.    Physical Exam   BP (!) 147/79   Pulse 82   Temp 97.6  F (36.4  C) (Oral)   Resp 16   Ht 1.626 m (5' 4\")   Wt 115.7 kg (255 lb)   SpO2 94%   BMI 43.77 kg/m      Constitutional: Awake, alert, cooperative, NAD.  ENT: Left cheek and eye abrasions present otherwise NCAT, oral pharynx with moist mucus membranes, tonsils without erythema or exudates.  Neck: Supple, symmetrical, trachea midline, no adenopathy.  Pulmonary: No increased work of breathing, fair air exchange, CTA bilaterally, no crackles or wheezing.  Cardiovascular: R/R/R, normal S1 and S2, no S3 or S4, and systolic murmur noted.  GI: Active bowel sounds, soft, non-distended, non-tender.  Obese.    Skin/Integumen: Left cheek and eye abrasions present.    Neuro: CN II-XII grossly intact.  Psych:  Alert and oriented x 3. Normal affect.  Extremities: Bilateral lower extremity edema noted.  Right lower extremity with compression stocking and left lower extremity with knee immobilizer in place.  Left calf/ankle tender to palpation.      Medical Decision Making       Please see A&P for additional details of medical decision making.  GREATER THAN 40 MINUTES SPENT BY ME on the date of service " doing chart review, history, exam, documentation & further activities per the note.         Medications   Current Facility-Administered Medications   Medication Dose Route Frequency Provider Last Rate Last Admin    lactated ringers infusion   Intravenous Continuous Mingo Sanchez MD 75 mL/hr at 07/31/24 2250 New Bag at 07/31/24 2250     Current Facility-Administered Medications   Medication Dose Route Frequency Provider Last Rate Last Admin    acetaminophen (TYLENOL) tablet 975 mg  975 mg Oral Q8H Mingo Sanchez MD   975 mg at 08/02/24 0542    ampicillin-sulbactam (UNASYN) 3 g vial to attach to  mL bag  3 g Intravenous Q6H Ben De Oliveira MD   3 g at 08/02/24 0406    aspirin EC tablet 81 mg  81 mg Oral BID Mingo Sanchez MD   81 mg at 08/01/24 2129    ezetimibe (ZETIA) tablet 10 mg  10 mg Oral Daily Ben De Oliveira MD   10 mg at 08/01/24 0907    FLUoxetine (PROzac) capsule 40 mg  40 mg Oral BID Ben De Oliveira MD   40 mg at 08/01/24 2128    fluticasone-vilanterol (BREO ELLIPTA) 100-25 MCG/ACT inhaler 1 puff  1 puff Inhalation Daily Ben De Oliveira MD   1 puff at 08/01/24 0926    gabapentin (NEURONTIN) capsule 900 mg  900 mg Oral TID Ben De Oliveira MD   900 mg at 08/01/24 2128    losartan-hydrochlorothiazide (HYZAAR) 50-12.5 MG per tablet 1 tablet  1 tablet Oral Daily Ben De Oliveira MD   1 tablet at 08/01/24 0924    miconazole (MICATIN) 2 % powder   Topical BID Ben De Oliveira MD   Given at 08/01/24 2129    polyethylene glycol (MIRALAX) Packet 17 g  17 g Oral Daily Mingo Sanchez MD   17 g at 08/01/24 0911    risperiDONE (risperDAL) tablet 1 mg  1 mg Oral TID Ben De Oliveira MD   1 mg at 08/01/24 2129    senna-docusate (SENOKOT-S/PERICOLACE) 8.6-50 MG per tablet 1 tablet  1 tablet Oral BID Mingo Sanchez MD   1 tablet at 08/01/24 2128    sodium chloride (PF) 0.9% PF flush 3 mL  3 mL Intracatheter Q8H Mingo Sanchez,  MD   3 mL at 08/02/24 0448    sodium chloride (PF) 0.9% PF flush 3 mL  3 mL Intracatheter Q8H Ben De Oliveira MD   3 mL at 08/02/24 0052    [Held by provider] torsemide (DEMADEX) tablet 20 mg  20 mg Oral Daily Ben De Oliveira MD        traZODone (DESYREL) tablet 100 mg  100 mg Oral At Bedtime Ben De Oliveira MD   100 mg at 08/01/24 2129    umeclidinium (INCRUSE ELLIPTA) 62.5 MCG/ACT inhaler 1 puff  1 puff Inhalation QAM Ben De Oliveira MD   1 puff at 08/01/24 0925       Data   Recent Labs   Lab 08/02/24  0733 08/01/24  0713 07/31/24  0636   WBC  --  8.0 7.1   HGB 9.9* 10.0* 11.0*   MCV  --  101* 99   PLT  --  190 199    138 136   POTASSIUM 4.2 4.8 4.0   CHLORIDE 104 102 99   CO2 29 29 28   BUN 22.1 18.4 18.9   CR 1.19* 1.13* 1.03*   ANIONGAP 6* 7 9   BRAYAN 8.2* 8.1* 8.6*   * 124* 98   ALBUMIN  --   --  2.9*   PROTTOTAL  --   --  6.2*   BILITOTAL  --   --  0.4   ALKPHOS  --   --  60   ALT  --   --  51*   AST  --   --  99*       No results found for this or any previous visit (from the past 24 hour(s)).   05/05/2023

## 2024-08-03 ENCOUNTER — APPOINTMENT (OUTPATIENT)
Dept: PHYSICAL THERAPY | Facility: CLINIC | Age: 74
DRG: 486 | End: 2024-08-03
Attending: INTERNAL MEDICINE
Payer: COMMERCIAL

## 2024-08-03 LAB
ANION GAP SERPL CALCULATED.3IONS-SCNC: 5 MMOL/L (ref 7–15)
BUN SERPL-MCNC: 16.2 MG/DL (ref 8–23)
CALCIUM SERPL-MCNC: 8.4 MG/DL (ref 8.8–10.4)
CHLORIDE SERPL-SCNC: 102 MMOL/L (ref 98–107)
CREAT SERPL-MCNC: 0.97 MG/DL (ref 0.51–0.95)
CRP SERPL-MCNC: 83.05 MG/L
EGFRCR SERPLBLD CKD-EPI 2021: 61 ML/MIN/1.73M2
ERYTHROCYTE [DISTWIDTH] IN BLOOD BY AUTOMATED COUNT: 12.7 % (ref 10–15)
ERYTHROCYTE [SEDIMENTATION RATE] IN BLOOD BY WESTERGREN METHOD: 73 MM/HR (ref 0–30)
GLUCOSE SERPL-MCNC: 113 MG/DL (ref 70–99)
HCO3 SERPL-SCNC: 29 MMOL/L (ref 22–29)
HCT VFR BLD AUTO: 31.5 % (ref 35–47)
HGB BLD-MCNC: 10.4 G/DL (ref 11.7–15.7)
MCH RBC QN AUTO: 32.8 PG (ref 26.5–33)
MCHC RBC AUTO-ENTMCNC: 33 G/DL (ref 31.5–36.5)
MCV RBC AUTO: 99 FL (ref 78–100)
PLATELET # BLD AUTO: 253 10E3/UL (ref 150–450)
POTASSIUM SERPL-SCNC: 4.3 MMOL/L (ref 3.4–5.3)
RBC # BLD AUTO: 3.17 10E6/UL (ref 3.8–5.2)
SODIUM SERPL-SCNC: 136 MMOL/L (ref 135–145)
WBC # BLD AUTO: 8.1 10E3/UL (ref 4–11)

## 2024-08-03 PROCEDURE — 99232 SBSQ HOSP IP/OBS MODERATE 35: CPT | Performed by: INTERNAL MEDICINE

## 2024-08-03 PROCEDURE — 97530 THERAPEUTIC ACTIVITIES: CPT | Mod: GP

## 2024-08-03 PROCEDURE — 250N000009 HC RX 250: Performed by: HOSPITALIST

## 2024-08-03 PROCEDURE — 36415 COLL VENOUS BLD VENIPUNCTURE: CPT | Performed by: PHYSICIAN ASSISTANT

## 2024-08-03 PROCEDURE — 80048 BASIC METABOLIC PNL TOTAL CA: CPT | Performed by: PHYSICIAN ASSISTANT

## 2024-08-03 PROCEDURE — 250N000013 HC RX MED GY IP 250 OP 250 PS 637: Performed by: ORTHOPAEDIC SURGERY

## 2024-08-03 PROCEDURE — 36569 INSJ PICC 5 YR+ W/O IMAGING: CPT

## 2024-08-03 PROCEDURE — 99232 SBSQ HOSP IP/OBS MODERATE 35: CPT | Performed by: STUDENT IN AN ORGANIZED HEALTH CARE EDUCATION/TRAINING PROGRAM

## 2024-08-03 PROCEDURE — 86140 C-REACTIVE PROTEIN: CPT | Performed by: INTERNAL MEDICINE

## 2024-08-03 PROCEDURE — 272N000450 HC KIT 4FR POWER PICC SINGLE LUMEN

## 2024-08-03 PROCEDURE — 250N000011 HC RX IP 250 OP 636: Performed by: HOSPITALIST

## 2024-08-03 PROCEDURE — 250N000013 HC RX MED GY IP 250 OP 250 PS 637: Performed by: HOSPITALIST

## 2024-08-03 PROCEDURE — 120N000001 HC R&B MED SURG/OB

## 2024-08-03 PROCEDURE — 85652 RBC SED RATE AUTOMATED: CPT | Performed by: INTERNAL MEDICINE

## 2024-08-03 PROCEDURE — 85027 COMPLETE CBC AUTOMATED: CPT | Performed by: PHYSICIAN ASSISTANT

## 2024-08-03 RX ADMIN — EZETIMIBE 10 MG: 10 TABLET ORAL at 09:02

## 2024-08-03 RX ADMIN — LIDOCAINE HYDROCHLORIDE 2 ML: 10 INJECTION, SOLUTION EPIDURAL; INFILTRATION; INTRACAUDAL; PERINEURAL at 17:45

## 2024-08-03 RX ADMIN — POLYETHYLENE GLYCOL 3350 17 G: 17 POWDER, FOR SOLUTION ORAL at 09:02

## 2024-08-03 RX ADMIN — RISPERIDONE 1 MG: 1 TABLET ORAL at 22:11

## 2024-08-03 RX ADMIN — RISPERIDONE 1 MG: 1 TABLET ORAL at 09:02

## 2024-08-03 RX ADMIN — AMPICILLIN SODIUM AND SULBACTAM SODIUM 3 G: 2; 1 INJECTION, POWDER, FOR SOLUTION INTRAMUSCULAR; INTRAVENOUS at 04:33

## 2024-08-03 RX ADMIN — FLUOXETINE HYDROCHLORIDE 40 MG: 20 CAPSULE ORAL at 09:02

## 2024-08-03 RX ADMIN — MICONAZOLE NITRATE: 2 POWDER TOPICAL at 09:03

## 2024-08-03 RX ADMIN — AMPICILLIN SODIUM AND SULBACTAM SODIUM 3 G: 2; 1 INJECTION, POWDER, FOR SOLUTION INTRAMUSCULAR; INTRAVENOUS at 15:53

## 2024-08-03 RX ADMIN — AMPICILLIN SODIUM AND SULBACTAM SODIUM 3 G: 2; 1 INJECTION, POWDER, FOR SOLUTION INTRAMUSCULAR; INTRAVENOUS at 22:16

## 2024-08-03 RX ADMIN — HYDROMORPHONE HYDROCHLORIDE 2 MG: 2 TABLET ORAL at 14:14

## 2024-08-03 RX ADMIN — GABAPENTIN 900 MG: 300 CAPSULE ORAL at 22:11

## 2024-08-03 RX ADMIN — AMPICILLIN SODIUM AND SULBACTAM SODIUM 3 G: 2; 1 INJECTION, POWDER, FOR SOLUTION INTRAMUSCULAR; INTRAVENOUS at 09:02

## 2024-08-03 RX ADMIN — FLUOXETINE HYDROCHLORIDE 40 MG: 20 CAPSULE ORAL at 22:10

## 2024-08-03 RX ADMIN — HYDROMORPHONE HYDROCHLORIDE 2 MG: 2 TABLET ORAL at 22:11

## 2024-08-03 RX ADMIN — SENNOSIDES AND DOCUSATE SODIUM 1 TABLET: 8.6; 5 TABLET ORAL at 09:02

## 2024-08-03 RX ADMIN — ACETAMINOPHEN 650 MG: 325 TABLET, FILM COATED ORAL at 22:10

## 2024-08-03 RX ADMIN — TRAZODONE HYDROCHLORIDE 100 MG: 100 TABLET ORAL at 22:11

## 2024-08-03 RX ADMIN — ASPIRIN 81 MG: 81 TABLET, COATED ORAL at 22:11

## 2024-08-03 RX ADMIN — GABAPENTIN 900 MG: 300 CAPSULE ORAL at 15:53

## 2024-08-03 RX ADMIN — ASPIRIN 81 MG: 81 TABLET, COATED ORAL at 09:02

## 2024-08-03 RX ADMIN — GABAPENTIN 900 MG: 300 CAPSULE ORAL at 09:02

## 2024-08-03 RX ADMIN — LOSARTAN POTASSIUM AND HYDROCHLOROTHIAZIDE 1 TABLET: 50; 12.5 TABLET, FILM COATED ORAL at 09:03

## 2024-08-03 RX ADMIN — RISPERIDONE 1 MG: 1 TABLET ORAL at 15:53

## 2024-08-03 RX ADMIN — ACETAMINOPHEN 975 MG: 325 TABLET, FILM COATED ORAL at 13:33

## 2024-08-03 RX ADMIN — ACETAMINOPHEN 975 MG: 325 TABLET, FILM COATED ORAL at 06:30

## 2024-08-03 RX ADMIN — FLUTICASONE FUROATE AND VILANTEROL 1 PUFF: 100; 25 POWDER RESPIRATORY (INHALATION) at 09:03

## 2024-08-03 ASSESSMENT — ACTIVITIES OF DAILY LIVING (ADL)
ADLS_ACUITY_SCORE: 42
ADLS_ACUITY_SCORE: 42
ADLS_ACUITY_SCORE: 36
ADLS_ACUITY_SCORE: 36
ADLS_ACUITY_SCORE: 40
ADLS_ACUITY_SCORE: 36
ADLS_ACUITY_SCORE: 38
ADLS_ACUITY_SCORE: 38
ADLS_ACUITY_SCORE: 36
ADLS_ACUITY_SCORE: 36
ADLS_ACUITY_SCORE: 42
ADLS_ACUITY_SCORE: 42
ADLS_ACUITY_SCORE: 38
ADLS_ACUITY_SCORE: 38
ADLS_ACUITY_SCORE: 36
ADLS_ACUITY_SCORE: 42
ADLS_ACUITY_SCORE: 38
ADLS_ACUITY_SCORE: 42
ADLS_ACUITY_SCORE: 38
ADLS_ACUITY_SCORE: 40
ADLS_ACUITY_SCORE: 42

## 2024-08-03 NOTE — PROCEDURES
[facility]    Single Lumen PICC Placement    Date/Time: 8/3/2024 6:36 PM    Performed by: Juanito Castillo RN  Authorized by: Adilia Oliver DO  Indications: Ltabx.      UNIVERSAL PROTOCOL   Site Marked: Yes  Prior Images Obtained and Reviewed:  NA  Required items: Required blood products, implants, devices and special equipment available    Patient identity confirmed:  Verbally with patient, arm band and hospital-assigned identification number  NA - No sedation, light sedation, or local anesthesia  Confirmation Checklist:  Patient's identity using two indicators, relevant allergies, procedure was appropriate and matched the consent or emergent situation and correct equipment/implants were available  Time out: Immediately prior to the procedure a time out was called    Universal Protocol: the Joint Commission Universal Protocol was followed    ESBL (mL):  0     ANESTHESIA    Anesthesia:  Local infiltration  Local Anesthetic:  Lidocaine 1% without epinephrine  Anesthetic Total (mL):  2      SEDATION    Patient Sedated: No        Preparation: skin prepped with 2% chlorhexidine  Skin prep agent: skin prep agent completely dried prior to procedure  Sterile barriers: maximum sterile barriers were used: cap, mask, sterile gown, sterile gloves, and large sterile sheet  Hand hygiene: hand hygiene performed prior to central venous catheter insertion  Type of line used: PICC  Catheter type: single lumen  Lumen type: valved and power PICC  Catheter size: 4 Fr  Brand: Bard  Lot number: [id number]  Placement method: venipuncture, MST, tip navigation system and ultrasound  Number of attempts: 1  Difficulty threading catheter: no  Successful placement: yes  Orientation: right    Location: basilic vein  Tip Location: SVC  Arm circumference: adults 10 cm  Extremity circumference: 38  Visible catheter length: 0  Total catheter length: 48  Dressing and securement: adhesive securement device, blood cleaned with  CHG, blood removed, chlorhexidine disc applied, dressing applied, line secured, statlock, occlusive dressing applied, sterile dressing applied and transparent securement dressing  Post procedure assessment: blood return through all ports and placement verified by 3CG technology  Complications: none.  PROCEDURE   Patient Tolerance:  Patient tolerated the procedure well with no immediate complicationsDescribe Procedure: 1L PICC line placed successfully w/o comps. Line flushed with NS and has good blood return. Tip in SVC as confirmed by 3CG.  Disposal: sharps and needle count correct at the end of procedure, needles and guidewire disposed in sharps container

## 2024-08-03 NOTE — PROGRESS NOTES
Orthopedic Surgery  Helga Geronimo  08/03/2024     Admit Date:  7/30/2024  POD: 3 Days Post-Op   Procedure(s):  Irrigation and debridement with polyethylene exchange Left TKA     Patient seated at bedside chair this morning.  Breathing comfortably on room air.    She continues to do well postoperatively.    Pain is currently rated a 5/10 at rest but patient reports improvement compared to yesterday.    She notes some discomfort behind her left ankle near her achilles.  Tolerating oral intake.    Denies nausea or vomiting  Denies chest pain or shortness of breath    Temp:  [98.3  F (36.8  C)-98.9  F (37.2  C)] 98.3  F (36.8  C)  Pulse:  [] 92  Resp:  [16-18] 18  BP: (137-158)/(83-88) 155/87  SpO2:  [93 %-94 %] 94 %    Alert and oriented, no distress, appears comfortable.  Prevena incisional vac in place and functioning.  Ace wrap in place, dry.   Bilateral calves are soft, non-tender.  Left lower extremity is NVI.  Sensation intact bilateral lower extremities  Patient able to resist dorsi and plantar flexion bilaterally  Knee immobilizer in place but posterior metal plate is pressing onto her achilles tendon.  I placed some gauze for added padding and elevated her foot with a towel roll to offset the pressure.  The patient noted improvement.    +Dp pulse    Labs:  Recent Labs   Lab Test 08/03/24  0719 08/02/24  0733 08/01/24  0713 07/31/24  0636   WBC 8.1  --  8.0 7.1   HGB 10.4* 9.9* 10.0* 11.0*     --  190 199     No lab results found.  Recent Labs   Lab Test 08/03/24  0719 07/31/24  0636   CRPI 83.05* 203.30*     Knee cultures:  Gram stain: no orgs  Cultures.  No growth      1. Plan:  Activity: WBAT with assist, KI at all times x 2 weeks   Dressing + changes: Prevena wound vac x 14 days. Keep prevena in place.    Drain: PHOEBE Drain on left lateral thigh/knee: removed on 8/2  DVT proph: Aspirin 81mg bid   Abx: Per ID  Dispo: TCU  F/U: 2 weeks w/ Dr. Sanchez     2. Disposition   Anticipate d/c to TCU when  Abx plan established, medically cleared and progressing in PT.    Fabiola Munoz PA-C

## 2024-08-03 NOTE — PROGRESS NOTES
[facility]    Infectious Disease Progress Note    Date of Service (when I saw the patient): 08/03/2024     Assessment & Plan   Helga Geronimo is a 74 year old female who was admitted on 7/30/2024.     mpression:  75 yo admitted with transferred from [facility] on July 30, 2024 due to a left prosthetic joint infection after a fall and inability to get up   History of total knee arthroplasty many yrs ago   The blood cultures were positive for Pasteurella.    She has 1 cat but no history of a bite   The aspiration from the left knee was significant for a total nucleated cell count of 68,000 cells with 94% neutrophils.  Cultures from the aspiration are pending.    She is subsequently transferred to [facility] for further evaluation and management   She is currently on Unasyn  S/p left knee I&D with poly exchange  And stain here noted for gram-positive cocci in broth only     Recommendations:  Continue  Unasyn  Follow-up for review blood cultures negative  Follow-up on the OR cultures surprisingly growing a gram-positive organism, obviously this infection should be the bacteremic Pasteurella infection but await the organism and reconsider option   OSH aspiration culture is negative, fits with the known Pasteurella as she was on antibiotics before that aspiration was done  Okay for PICC line, await the actual organism from the joint to see if antibiotics covering that and the Pasteurella are going to be needed if just Pasteurella ceftriaxone the obvious choice  Will need extended IV antibiotics okay to place PICC line, will also need extended oral antibiotics as has had exchange arthroplasty      Altaf Campoverde MD    Interval History   Tolerating antibiotics ok   No changes pain relatively controlled  No fever  Blood culture here negative, aspiration culture on antibiotics negative at [facility], joint culture here growing a gram-positive organism, full information to  follow.  Physical Exam   Temp: 98.3  F (36.8  C) Temp src: Oral BP: (!) 155/87 Pulse: 92   Resp: 18 SpO2: 94 % O2 Device: None (Room air)    Vitals:    07/31/24 1514   Weight: 115.7 kg (255 lb)     Vital Signs with Ranges  Temp:  [98.3  F (36.8  C)-98.9  F (37.2  C)] 98.3  F (36.8  C)  Pulse:  [] 92  Resp:  [16-18] 18  BP: (137-158)/(83-88) 155/87  SpO2:  [93 %-94 %] 94 %    Constitutional: Awake, alert, cooperative, no apparent distress  Lungs: Clear to auscultation bilaterally, no crackles or wheezing  Cardiovascular: Regular rate and rhythm, normal S1 and S2, and no murmur noted  Abdomen: Normal bowel sounds, soft, non-distended, non-tender  Skin: No rashes, no cyanosis, no edema  Other: Joint looks okay    Medications   Current Facility-Administered Medications   Medication Dose Route Frequency Provider Last Rate Last Admin     Current Facility-Administered Medications   Medication Dose Route Frequency Provider Last Rate Last Admin    acetaminophen (TYLENOL) tablet 975 mg  975 mg Oral Q8H Mingo Sanchez MD   975 mg at 08/03/24 0630    ampicillin-sulbactam (UNASYN) 3 g vial to attach to  mL bag  3 g Intravenous Q6H Ben De Oliveira MD   3 g at 08/03/24 0902    aspirin EC tablet 81 mg  81 mg Oral BID Mingo Sanchez MD   81 mg at 08/03/24 0902    ezetimibe (ZETIA) tablet 10 mg  10 mg Oral Daily Ben De Oliveira MD   10 mg at 08/03/24 0902    FLUoxetine (PROzac) capsule 40 mg  40 mg Oral BID Ben De Oliveira MD   40 mg at 08/03/24 0902    fluticasone-vilanterol (BREO ELLIPTA) 100-25 MCG/ACT inhaler 1 puff  1 puff Inhalation Daily Ben De Oliveira MD   1 puff at 08/03/24 0903    gabapentin (NEURONTIN) capsule 900 mg  900 mg Oral TID Ben De Oliveira MD   900 mg at 08/03/24 0902    losartan-hydrochlorothiazide (HYZAAR) 50-12.5 MG per tablet 1 tablet  1 tablet Oral Daily Ben De Oliveira MD   1 tablet at 08/03/24 0903    miconazole (MICATIN) 2 % powder  "  Topical BID Ben De Oliveira MD   Given at 08/03/24 0903    polyethylene glycol (MIRALAX) Packet 17 g  17 g Oral Daily Mingo Sanchez MD   17 g at 08/03/24 0902    risperiDONE (risperDAL) tablet 1 mg  1 mg Oral TID Ben De Oliveira MD   1 mg at 08/03/24 0902    senna-docusate (SENOKOT-S/PERICOLACE) 8.6-50 MG per tablet 1 tablet  1 tablet Oral BID Mingo Sanchez MD   1 tablet at 08/03/24 0902    sodium chloride (PF) 0.9% PF flush 3 mL  3 mL Intracatheter Q8H Mingo Sanchez MD   3 mL at 08/03/24 0910    torsemide (DEMADEX) tablet 20 mg  20 mg Oral Daily Adilia Oliver DO        traZODone (DESYREL) tablet 100 mg  100 mg Oral At Bedtime Ben De Oliveira MD   100 mg at 08/02/24 2123    umeclidinium (INCRUSE ELLIPTA) 62.5 MCG/ACT inhaler 1 puff  1 puff Inhalation QAM Ben De Oliveira MD   1 puff at 08/03/24 0903       Data   All microbiology laboratory data reviewed.  Recent Labs   Lab Test 08/03/24 0719 08/02/24 0733 08/01/24  0713 07/31/24  0636   WBC 8.1  --  8.0 7.1   HGB 10.4* 9.9* 10.0* 11.0*   HCT 31.5*  --  31.2* 32.3*   MCV 99  --  101* 99     --  190 199     Recent Labs   Lab Test 08/03/24 0719 08/02/24  0733 08/01/24  0713   CR 0.97* 1.19* 1.13*     Recent Labs   Lab Test 08/03/24 0719   SED 73*     No lab results found.    Invalid input(s): \"UC\"    All cultures:  Recent Labs   Lab 07/31/24  1655 07/31/24  1653 07/31/24  1633 07/31/24  0636   CULTURE No growth after 2 days  No growth after 2 days  No growth after 2 days  No anaerobic organisms isolated after 2 days  No anaerobic organisms isolated after 2 days  No anaerobic organisms isolated after 2 days  See corresponding culture for results  See corresponding culture for results  See corresponding culture for results No growth after 2 days  No anaerobic organisms isolated after 2 days  See corresponding culture for results Culture in progress  Isolated in broth only Staphylococcus " debbieis*  No anaerobic organisms isolated after 2 days  See corresponding culture for results No growth after 3 days      Blood culture:  Results for orders placed or performed during the hospital encounter of 07/30/24   Blood Culture Arm, Right    Specimen: Arm, Right; Blood   Result Value Ref Range    Culture No growth after 3 days    Results for orders placed or performed in visit on 12/10/09   Blood culture   Result Value Ref Range    Specimen Description Blood SITE NOT SPECIFIED     Culture Micro No growth after 6 days     Micro Report Status FINAL [id number]       Urine culture:  Results for orders placed or performed in visit on 12/09/09   Urine culture   Result Value Ref Range    Specimen Description Catheterized Urine     Culture Micro No growth     Micro Report Status FINAL 12/11/2009

## 2024-08-03 NOTE — PROGRESS NOTES
[facility]  Hospitalist Progress Note    Assessment & Plan   Helga Geronimo is a 74 year old female who was directly admitted to [facility] on 7/30/2024 due to bacteremia and left TKA prosthetic joint infection.     Past medical history significant for HTN, HLP, CKD stage III, COPD, MDD with anxiety, Obesity, Bilateral lower extremity lymphedema.       Pasteurella bacteremia  Left TKA prosthetic joint infection s/p left knee irrigation and debridement with polyethylene exchange and Pravena Wound Vac placement (14 days)  Left lower extremity cellulitis  Bilateral lower extremity lymphedema  Patient slipped and fell prior to admission. Developed left knee pain after the fall. Had erythema of left knee/leg at time of admission. History of bilateral lower extremity lymphedema and wounds. Blood cultures from [place] [facility] positive for Pasteurella. S/p left knee arthrocentesis - culture pending, cell count with 68,000 nucleated cells (94% neutrophils). Transferred from [facility] on 7/30/24 for further evaluation and management by Dr. Yu, who is the patient's primary Orthopedic Surgeon that performed her initial left TKA in August 2020.  *Left knee aspirate with monosodium urates present.    -Orthopedic surgery consult appreciated  Pain management and DVT prophylaxis (ASA 81 mg BID) per Ortho.    PHOEBE drain pulled 08/02  - Infectious Disease consult appreciated:  Continue Unasyn 3 g every 6 hours, await intraoperative cultures.    Chronic lower extremity wounds  Right elbow wound secondary to pressure, friction and trauma  Left cheek and eye wound (traumatic)  [facility].     Rhabdomyolysis (Resolving)  Fall with prolonged time on floor  Secondary to fall and laying on the floor overnight.    *CK: 9,563-->5,334-->2,435-->711.  - stop IVF     Hypertension  - Continued on PTA  "olmesartan-hydrochlorothiazide.  - plan to restart torsemide tomorrow     Hyperlipidemia  *Prior history of significant myalgias with statins.  - Continued on PTA ezetimibe.    Chronic kidney disease, stage 3  *Creatinine ranges between 1.10-1.62 since 2023.    - stable     COPD  Denies shortness of breath, wheezing.  - Continued on PTA Symbicort or formulary equivalent.  - Continued on PTA Spiriva.  - Albuterol inhaler available PRN.       MDD with anxiety  Insomnia  -Continued on PTA fluoxetine, Risperdal, and trazodone.     Morbid obesity  BMI 43.89 kg/m2 per outside records.      Anemia, mild   -Monitor.      Mitral stenosis  Elevated pulmonary pressures  -Outpatient follow up.      Heart murmur  *Patient has never been told she has a murmur.  She had an ECHO completed at [place] 7/28.    -No interventions.      Clinically Significant Risk Factors              # Hypoalbuminemia: Lowest albumin = 2.9 g/dL at 7/31/2024  6:36 AM, will monitor as appropriate           # Severe Obesity: Estimated body mass index is 43.77 kg/m  as calculated from the following:    Height as of this encounter: 1.626 m (5' 4\").    Weight as of this encounter: 115.7 kg (255 lb).    # Severe Obesity: Estimated body mass index is 43.77 kg/m  as calculated from the following:    Height as of this encounter: 1.626 m (5' 4\").    Weight as of this encounter: 115.7 kg (255 lb)., PRESENT ON ADMISSION            Diet: Advance Diet as Tolerated: Regular Diet Adult  Room Service     DVT Prophylaxis: Pneumatic Compression Devices and ASA 81 mg BID   Fuchs Catheter: Not present  Lines: None     Cardiac Monitoring: None  Code Status: Full Code       Disposition Plan    Inpatient status.  Undergoing continued work-up/management of pasteurella bacteremia and Left TKA prosthetic joint infection.      Medically Ready for Discharge: Anticipated in 2-4 Days    The patient's care was discussed with the Bedside Nurse and Patient.        Adilia Oliver, " "DO  [facility]  Securely message with the Vocera Web Console (learn more here)      Interval History   Patient up in the chair and on the phone. She takes a break but does not hang up for interview. Denies pain. Aware of plan for antibiotics and monitoring cultures. No questions  for me    -Data reviewed today: I reviewed all new labs and imaging results over the last 24 hours. I personally reviewed no images or EKG's today.    Physical Exam   BP (!) 155/87 (BP Location: Right arm)   Pulse 92   Temp 98.3  F (36.8  C) (Oral)   Resp 18   Ht 1.626 m (5' 4\")   Wt 115.7 kg (255 lb)   SpO2 94%   BMI 43.77 kg/m      Constitutional: Awake, alert, cooperative, no apparent distress  Respiratory: Clear to auscultation bilaterally, no crackles or wheezing  Cardiovascular: Regular rate and rhythm, normal S1 and S2, and no murmur noted  GI: Normal bowel sounds, soft, non-distended, non-tender  Other: L leg with knee immobilizer in place    Medical Decision Making       41 MINUTES SPENT BY ME on the date of service doing chart review, history, exam, documentation & further activities per the note.         Medications   Current Facility-Administered Medications   Medication Dose Route Frequency Provider Last Rate Last Admin     Current Facility-Administered Medications   Medication Dose Route Frequency Provider Last Rate Last Admin    acetaminophen (TYLENOL) tablet 975 mg  975 mg Oral Q8H Mingo Sanchez MD   975 mg at 08/03/24 0630    ampicillin-sulbactam (UNASYN) 3 g vial to attach to  mL bag  3 g Intravenous Q6H Ben De Oliveira MD   3 g at 08/03/24 0902    aspirin EC tablet 81 mg  81 mg Oral BID Mingo Sanchez MD   81 mg at 08/03/24 0902    ezetimibe (ZETIA) tablet 10 mg  10 mg Oral Daily Ben De Oliveira MD   10 mg at 08/03/24 0902    FLUoxetine (PROzac) capsule 40 mg  40 mg Oral BID Ben De Oliveira MD   40 mg at 08/03/24 0902    fluticasone-vilanterol " (BREO ELLIPTA) 100-25 MCG/ACT inhaler 1 puff  1 puff Inhalation Daily Ben De Oliveira MD   1 puff at 08/03/24 0903    gabapentin (NEURONTIN) capsule 900 mg  900 mg Oral TID Ben De Oliveira MD   900 mg at 08/03/24 0902    losartan-hydrochlorothiazide (HYZAAR) 50-12.5 MG per tablet 1 tablet  1 tablet Oral Daily Ben De Oliveira MD   1 tablet at 08/03/24 0903    miconazole (MICATIN) 2 % powder   Topical BID Ben De Oliveira MD   Given at 08/03/24 0903    polyethylene glycol (MIRALAX) Packet 17 g  17 g Oral Daily Mingo Sanchez MD   17 g at 08/03/24 0902    risperiDONE (risperDAL) tablet 1 mg  1 mg Oral TID Ben De Oliveira MD   1 mg at 08/03/24 0902    senna-docusate (SENOKOT-S/PERICOLACE) 8.6-50 MG per tablet 1 tablet  1 tablet Oral BID Mingo Sanchez MD   1 tablet at 08/03/24 0902    sodium chloride (PF) 0.9% PF flush 3 mL  3 mL Intracatheter Q8H Mingo Sanchez MD   3 mL at 08/03/24 0910    torsemide (DEMADEX) tablet 20 mg  20 mg Oral Daily Adilia Olivre DO        traZODone (DESYREL) tablet 100 mg  100 mg Oral At Bedtime Ben De Oliveira MD   100 mg at 08/02/24 2123    umeclidinium (INCRUSE ELLIPTA) 62.5 MCG/ACT inhaler 1 puff  1 puff Inhalation QAM Ben De Oliveira MD   1 puff at 08/03/24 0903       Data   Recent Labs   Lab 08/03/24  0719 08/02/24  0733 08/01/24  0713 07/31/24  0636   WBC 8.1  --  8.0 7.1   HGB 10.4* 9.9* 10.0* 11.0*   MCV 99  --  101* 99     --  190 199    139 138 136   POTASSIUM 4.3 4.2 4.8 4.0   CHLORIDE 102 104 102 99   CO2 29 29 29 28   BUN 16.2 22.1 18.4 18.9   CR 0.97* 1.19* 1.13* 1.03*   ANIONGAP 5* 6* 7 9   BRAYAN 8.4* 8.2* 8.1* 8.6*   * 106* 124* 98   ALBUMIN  --   --   --  2.9*   PROTTOTAL  --   --   --  6.2*   BILITOTAL  --   --   --  0.4   ALKPHOS  --   --   --  60   ALT  --   --   --  51*   AST  --   --   --  99*       No results found for this or any previous visit (from the past 24  hour(s)).

## 2024-08-03 NOTE — PLAN OF CARE
Goal Outcome Evaluation:      Diagnosis:I&D left knee with poly exchange  POD#: 3  Mental Status: a/O x4  Activity/dangle: 2 sonya steady  Diet: Regular  Pain: Scheduled Tylenol given  Fuchs/Voiding: Purewick  Tele/Restraints/Iso: NA  02/LDA: RA/ PIV sl / Prevena wound vac intact  D/C Date:Pending  Other Info: mepilex to right elbow, coccyx, right calf. Abrasion to left cheek- open to air. On intermittent IV abx.           no fever

## 2024-08-03 NOTE — PLAN OF CARE
Diagnosis: fall, left knee infectinon 7/31 Left knee I&D, poly exchange  POD#: 3  Mental Status: A&O x 4  Activity/dangle up with 2 & Sylvia Steady  Diet:Regular  Pain:  po dilaudid, scheduled tylenol  Fuchs/Voiding: voiding, purewick   Tele/Restraints/Iso: NA  02/LDA: SL  D/C Date ?  Other Info: PICC orders placed, KI to LLE, prevena vac to left incision.  Wound to Right calf & coccyx changed, abrasion to left cheek. Start torsemide tomorrow.

## 2024-08-03 NOTE — PLAN OF CARE
Goal Outcome Evaluation:  Date/Time 8/2 lucas    Trauma/Ortho/Medical (Choose one) ortho    Diagnosis:I&D left knee with poly exchange  POD#:2  Mental Status:  Activity/dangle turns with 2 assit. Up sonya steady  Diet:regular  Pain:dilaudid po  Fuchs/Voiding:purewick  Tele/Restraints/Iso:no  02/LDA:saline lock right hand. Preveena wound vac  D/C Date:?  Other Info:[place], mepilex to right elbow, coccyx, Right calf. Abrasion to left cheek

## 2024-08-04 LAB — GLUCOSE BLDC GLUCOMTR-MCNC: 104 MG/DL (ref 70–99)

## 2024-08-04 PROCEDURE — 120N000001 HC R&B MED SURG/OB

## 2024-08-04 PROCEDURE — 250N000013 HC RX MED GY IP 250 OP 250 PS 637: Performed by: ORTHOPAEDIC SURGERY

## 2024-08-04 PROCEDURE — 250N000013 HC RX MED GY IP 250 OP 250 PS 637: Performed by: STUDENT IN AN ORGANIZED HEALTH CARE EDUCATION/TRAINING PROGRAM

## 2024-08-04 PROCEDURE — 250N000011 HC RX IP 250 OP 636: Performed by: HOSPITALIST

## 2024-08-04 PROCEDURE — 250N000013 HC RX MED GY IP 250 OP 250 PS 637: Performed by: HOSPITALIST

## 2024-08-04 PROCEDURE — 99232 SBSQ HOSP IP/OBS MODERATE 35: CPT | Performed by: STUDENT IN AN ORGANIZED HEALTH CARE EDUCATION/TRAINING PROGRAM

## 2024-08-04 PROCEDURE — 99232 SBSQ HOSP IP/OBS MODERATE 35: CPT | Performed by: INTERNAL MEDICINE

## 2024-08-04 RX ORDER — HEPARIN SODIUM,PORCINE 10 UNIT/ML
5-20 VIAL (ML) INTRAVENOUS EVERY 24 HOURS
Status: DISCONTINUED | OUTPATIENT
Start: 2024-08-04 | End: 2024-08-05

## 2024-08-04 RX ORDER — HEPARIN SODIUM,PORCINE 10 UNIT/ML
5-20 VIAL (ML) INTRAVENOUS
Status: DISCONTINUED | OUTPATIENT
Start: 2024-08-04 | End: 2024-08-05

## 2024-08-04 RX ADMIN — SENNOSIDES AND DOCUSATE SODIUM 1 TABLET: 8.6; 5 TABLET ORAL at 20:37

## 2024-08-04 RX ADMIN — AMPICILLIN SODIUM AND SULBACTAM SODIUM 3 G: 2; 1 INJECTION, POWDER, FOR SOLUTION INTRAMUSCULAR; INTRAVENOUS at 05:14

## 2024-08-04 RX ADMIN — GABAPENTIN 900 MG: 300 CAPSULE ORAL at 15:42

## 2024-08-04 RX ADMIN — MICONAZOLE NITRATE: 2 POWDER TOPICAL at 20:38

## 2024-08-04 RX ADMIN — ALBUTEROL SULFATE 2 PUFF: 108 INHALANT RESPIRATORY (INHALATION) at 20:07

## 2024-08-04 RX ADMIN — TORSEMIDE 20 MG: 20 TABLET ORAL at 08:19

## 2024-08-04 RX ADMIN — HYDROMORPHONE HYDROCHLORIDE 2 MG: 2 TABLET ORAL at 14:45

## 2024-08-04 RX ADMIN — TRAZODONE HYDROCHLORIDE 100 MG: 100 TABLET ORAL at 22:11

## 2024-08-04 RX ADMIN — LOSARTAN POTASSIUM AND HYDROCHLOROTHIAZIDE 1 TABLET: 50; 12.5 TABLET, FILM COATED ORAL at 08:17

## 2024-08-04 RX ADMIN — EZETIMIBE 10 MG: 10 TABLET ORAL at 08:18

## 2024-08-04 RX ADMIN — GABAPENTIN 900 MG: 300 CAPSULE ORAL at 08:17

## 2024-08-04 RX ADMIN — ASPIRIN 81 MG: 81 TABLET, COATED ORAL at 08:18

## 2024-08-04 RX ADMIN — Medication 5 ML: at 22:19

## 2024-08-04 RX ADMIN — RISPERIDONE 1 MG: 1 TABLET ORAL at 22:11

## 2024-08-04 RX ADMIN — MICONAZOLE NITRATE: 2 POWDER TOPICAL at 08:16

## 2024-08-04 RX ADMIN — AMPICILLIN SODIUM AND SULBACTAM SODIUM 3 G: 2; 1 INJECTION, POWDER, FOR SOLUTION INTRAMUSCULAR; INTRAVENOUS at 15:42

## 2024-08-04 RX ADMIN — FLUOXETINE HYDROCHLORIDE 40 MG: 20 CAPSULE ORAL at 08:18

## 2024-08-04 RX ADMIN — FLUOXETINE HYDROCHLORIDE 40 MG: 20 CAPSULE ORAL at 20:37

## 2024-08-04 RX ADMIN — AMPICILLIN SODIUM AND SULBACTAM SODIUM 3 G: 2; 1 INJECTION, POWDER, FOR SOLUTION INTRAMUSCULAR; INTRAVENOUS at 22:09

## 2024-08-04 RX ADMIN — RISPERIDONE 1 MG: 1 TABLET ORAL at 08:17

## 2024-08-04 RX ADMIN — ASPIRIN 81 MG: 81 TABLET, COATED ORAL at 20:38

## 2024-08-04 RX ADMIN — GABAPENTIN 900 MG: 300 CAPSULE ORAL at 22:11

## 2024-08-04 RX ADMIN — AMPICILLIN SODIUM AND SULBACTAM SODIUM 3 G: 2; 1 INJECTION, POWDER, FOR SOLUTION INTRAMUSCULAR; INTRAVENOUS at 10:24

## 2024-08-04 RX ADMIN — RISPERIDONE 1 MG: 1 TABLET ORAL at 15:42

## 2024-08-04 RX ADMIN — ALBUTEROL SULFATE 2 PUFF: 108 INHALANT RESPIRATORY (INHALATION) at 10:25

## 2024-08-04 RX ADMIN — Medication 5 ML: at 20:38

## 2024-08-04 RX ADMIN — FLUTICASONE FUROATE AND VILANTEROL 1 PUFF: 100; 25 POWDER RESPIRATORY (INHALATION) at 08:17

## 2024-08-04 RX ADMIN — SENNOSIDES AND DOCUSATE SODIUM 1 TABLET: 8.6; 5 TABLET ORAL at 08:18

## 2024-08-04 ASSESSMENT — ACTIVITIES OF DAILY LIVING (ADL)
ADLS_ACUITY_SCORE: 37
ADLS_ACUITY_SCORE: 38
ADLS_ACUITY_SCORE: 37
ADLS_ACUITY_SCORE: 38
ADLS_ACUITY_SCORE: 37
ADLS_ACUITY_SCORE: 38
ADLS_ACUITY_SCORE: 38
ADLS_ACUITY_SCORE: 37
ADLS_ACUITY_SCORE: 37
ADLS_ACUITY_SCORE: 38
ADLS_ACUITY_SCORE: 37

## 2024-08-04 NOTE — PROGRESS NOTES
07/05/22 10:12 AM     See documentation in the [employer] SmartForm       Alvaro Piedra MA Care Management Follow Up    Length of Stay (days): 5    Expected Discharge Date: 08/05/2024     Concerns to be Addressed:     Discharge Planning  Patient plan of care discussed at interdisciplinary rounds: Yes    Anticipated Discharge Disposition: Transitional Care     Anticipated Discharge Services:  Therapy & IV ABX  Anticipated Discharge DME: [facility]    Patient/family educated on Medicare website which has current facility and service quality ratings: yes  Education Provided on the Discharge Plan: Yes  Patient/Family in Agreement with the Plan: yes    Referrals Placed by CM/SW: Post Acute Facilities  Private pay costs discussed: Not applicable    Additional Information:  Call placed and message left at [facility] to follow up on the referral from last week.  Awaiting a return call.  Also sent a referral to [facility], via the discharge navigator, to check bed availability as patient has [facility] and the options are limited as many facilities do not accept [facility] insurance.    Will continue to follow.      ALDA Bradford, [facility]    [phone]  [facility]

## 2024-08-04 NOTE — PLAN OF CARE
Goal Outcome Evaluation:    TIME: 1900-7:30        Trauma / Ortho      Diagnosis: Irrigation and debridement with polyethylene exchange of the L knee      POD#: 4  Mental Status: A&O x 4  Activity/dangle: Assist of 2 with Sylvia Herrera  Diet: Regular  Pain: Dilaudid po x 1, scheduled tylenol  Fuchs/Voiding: Pure-wick   Tele/Restraints/Iso: NA  02/LDA: RA  D/C Date TBD    Other Info: hx of fall, PICC on RUE intact and flushed,  LLE, prevena vac to left incision.  Dressing on Right calf & coccyx in place, abrasion to left cheek. Mepilex on buttock, calcium 8.4 and HB 10.4.

## 2024-08-04 NOTE — PLAN OF CARE
Diagnosis: fall, left knee infectinon 7/31 Left knee I&D, poly exchange  POD#: 4  Mental Status: A&O x 4  Activity/dangle up with 2 & Sylvia Steady  Diet:Regular  Pain:  po dilaudid, scheduled tylenol  Fuchs/Voiding: voiding, purewick   Tele/Restraints/Iso: NA  02/LDA: PICC RUE  D/C Date ?  Other Info: KI to LLE, prevena vac to left incision.  Wound to Right calf, coccyx, right elbow, abrasion to left cheek.

## 2024-08-04 NOTE — PROGRESS NOTES
[facility]    Infectious Disease Progress Note    Date of Service (when I saw the patient): 08/04/2024     Assessment & Plan   Helga Geronimo is a 74 year old female who was admitted on 7/30/2024.     mpression:  75 yo admitted with transferred from [facility] on July 30, 2024 due to a left prosthetic joint infection after a fall and inability to get up   History of total knee arthroplasty many yrs ago   The blood cultures were positive for Pasteurella.    She has 1 cat but no history of a bite   The aspiration from the left knee was significant for a total nucleated cell count of 68,000 cells with 94% neutrophils.  Cultures from the aspiration are pending.    She is subsequently transferred to [facility] for further evaluation and management   She is currently on Unasyn  S/p left knee I&D with poly exchange  And stain here noted for gram-positive cocci in broth only     Recommendations:  Continue  Unasyn  Follow-up for review blood cultures negative  Follow-up on the OR cultures surprisingly growing staph lugdunensis, obviously this infection should be the bacteremic Pasteurella infection but this organism a true pathogen and must be covered sensitivity pending but nearly universally sensitive so likely Unasyn acceptable   OSH aspiration culture is negative, fits with the known Pasteurella as she was on antibiotics before that aspiration was done   PICC line, Unasyn a good choice looks like rehab as disposition so Unasyn should be able to be done there  Will need extended IV antibiotics okay to place PICC line, will also need extended oral antibiotics as has had exchange arthroplasty      Altaf Campoverde MD    Interval History   Tolerating antibiotics ok   No changes pain relatively controlled  No fever  Blood culture here negative, aspiration culture on antibiotics negative at [facility], joint culture here growing staph lugdunensis.  Physical Exam   Temp: 98.7  F  (37.1  C) Temp src: Oral BP: (!) 158/89 Pulse: 97   Resp: 17 SpO2: 92 % O2 Device: None (Room air)    Vitals:    07/31/24 1514   Weight: 115.7 kg (255 lb)     Vital Signs with Ranges  Temp:  [98.3  F (36.8  C)-98.7  F (37.1  C)] 98.7  F (37.1  C)  Pulse:  [94-97] 97  Resp:  [17-18] 17  BP: (123-158)/(66-89) 158/89  SpO2:  [92 %-97 %] 92 %    Constitutional: Awake, alert, cooperative, no apparent distress  Lungs: Clear to auscultation bilaterally, no crackles or wheezing  Cardiovascular: Regular rate and rhythm, normal S1 and S2, and no murmur noted  Abdomen: Normal bowel sounds, soft, non-distended, non-tender  Skin: No rashes, no cyanosis, no edema  Other: Joint looks okay    Medications   Current Facility-Administered Medications   Medication Dose Route Frequency Provider Last Rate Last Admin     Current Facility-Administered Medications   Medication Dose Route Frequency Provider Last Rate Last Admin    ampicillin-sulbactam (UNASYN) 3 g vial to attach to  mL bag  3 g Intravenous Q6H Ben De Oliveira MD   3 g at 08/04/24 1024    aspirin EC tablet 81 mg  81 mg Oral BID Mingo Sanchez MD   81 mg at 08/04/24 0818    ezetimibe (ZETIA) tablet 10 mg  10 mg Oral Daily Ben De Oliveira MD   10 mg at 08/04/24 0818    FLUoxetine (PROzac) capsule 40 mg  40 mg Oral BID Ben De Oliveira MD   40 mg at 08/04/24 0818    fluticasone-vilanterol (BREO ELLIPTA) 100-25 MCG/ACT inhaler 1 puff  1 puff Inhalation Daily Ben De Oliveira MD   1 puff at 08/04/24 0817    gabapentin (NEURONTIN) capsule 900 mg  900 mg Oral TID Ben De Oliveira MD   900 mg at 08/04/24 0817    losartan-hydrochlorothiazide (HYZAAR) 50-12.5 MG per tablet 1 tablet  1 tablet Oral Daily Ben De Oliveira MD   1 tablet at 08/04/24 0817    miconazole (MICATIN) 2 % powder   Topical BID Ben De Oliveira MD   Given at 08/04/24 0816    polyethylene glycol (MIRALAX) Packet 17 g  17 g Oral Daily Mingo Sanchez  "MD CHOLO   17 g at 08/03/24 0902    risperiDONE (risperDAL) tablet 1 mg  1 mg Oral TID Ben De Oliveira MD   1 mg at 08/04/24 0817    senna-docusate (SENOKOT-S/PERICOLACE) 8.6-50 MG per tablet 1 tablet  1 tablet Oral BID Mingo Sanchez MD   1 tablet at 08/04/24 0818    sodium chloride (PF) 0.9% PF flush 3 mL  3 mL Intracatheter Q8H Mingo Sanchez MD   3 mL at 08/04/24 0817    torsemide (DEMADEX) tablet 20 mg  20 mg Oral Daily Adilia Oliver DO   20 mg at 08/04/24 0819    traZODone (DESYREL) tablet 100 mg  100 mg Oral At Bedtime Ben De Oliveira MD   100 mg at 08/03/24 2211    umeclidinium (INCRUSE ELLIPTA) 62.5 MCG/ACT inhaler 1 puff  1 puff Inhalation QAM Ben De Oliveira MD   1 puff at 08/04/24 0817       Data   All microbiology laboratory data reviewed.  Recent Labs   Lab Test 08/03/24  0719 08/02/24  0733 08/01/24  0713 07/31/24  0636   WBC 8.1  --  8.0 7.1   HGB 10.4* 9.9* 10.0* 11.0*   HCT 31.5*  --  31.2* 32.3*   MCV 99  --  101* 99     --  190 199     Recent Labs   Lab Test 08/03/24  0719 08/02/24  0733 08/01/24  0713   CR 0.97* 1.19* 1.13*     Recent Labs   Lab Test 08/03/24  0719   SED 73*     No lab results found.    Invalid input(s): \"UC\"    All cultures:  Recent Labs   Lab 07/31/24  1655 07/31/24  1653 07/31/24  1633 07/31/24  0636   CULTURE No growth after 3 days  No growth after 3 days  No growth after 3 days  No anaerobic organisms isolated after 3 days  No anaerobic organisms isolated after 3 days  No anaerobic organisms isolated after 3 days  See corresponding culture for results  See corresponding culture for results  See corresponding culture for results No growth after 3 days  No anaerobic organisms isolated after 3 days  See corresponding culture for results Isolated in broth only Staphylococcus lugdunensis*  No anaerobic organisms isolated after 3 days  See corresponding culture for results No growth after 4 days      Blood culture:  Results " for orders placed or performed during the hospital encounter of 07/30/24   Blood Culture Arm, Right    Specimen: Arm, Right; Blood   Result Value Ref Range    Culture No growth after 4 days    Results for orders placed or performed in visit on 12/10/09   Blood culture   Result Value Ref Range    Specimen Description Blood SITE NOT SPECIFIED     Culture Micro No growth after 6 days     Micro Report Status FINAL [id number]       Urine culture:  Results for orders placed or performed in visit on 12/09/09   Urine culture   Result Value Ref Range    Specimen Description Catheterized Urine     Culture Micro No growth     Micro Report Status FINAL 12/11/2009

## 2024-08-04 NOTE — PROGRESS NOTES
[facility]  Hospitalist Progress Note    Assessment & Plan   Helga Geronimo is a 74 year old female who was directly admitted to [facility] on 7/30/2024 due to bacteremia and left TKA prosthetic joint infection.     Past medical history significant for HTN, HLP, CKD stage III, COPD, MDD with anxiety, Obesity, Bilateral lower extremity lymphedema.       Pasteurella bacteremia  Left TKA prosthetic joint infection s/p left knee irrigation and debridement with polyethylene exchange and Pravena Wound Vac placement (14 days)  Left lower extremity cellulitis  Bilateral lower extremity lymphedema  Patient slipped and fell prior to admission. Developed left knee pain after the fall. Had erythema of left knee/leg at time of admission. History of bilateral lower extremity lymphedema and wounds. Blood cultures from [place] ER positive for Pasteurella. S/p left knee arthrocentesis - culture pending, cell count with 68,000 nucleated cells (94% neutrophils). Transferred from [facility] to [facility] on 7/30/24 for further evaluation and management by Dr. Yu, who is the patient's primary Orthopedic Surgeon that performed her initial left TKA in August 2020.  *Left knee aspirate with monosodium urates present.      -Ortho following guiding, mobility, pain management and DVT prophylaxis (ASA 81 mg BID)   PHOEBE drain pulled 08/02  - Infectious Disease consult appreciated:  Will plan for unasyn on discharge due to intra op staph lugdunensis and pastuerella bacteremia. Plan for TCU at discharge SW aware.    Chronic lower extremity wounds  Right elbow wound secondary to pressure, friction and trauma  Left cheek and eye wound (traumatic)  Coccyx injury secondary to friction, intertrigo, and MASD  [facility] nurse consult appreciated.     Rhabdomyolysis (Resolving)  Fall with prolonged time on floor  Secondary to fall and laying on the floor overnight.    *CK: 9,563-->5,334-->2,435-->711.  - stop IVF    "  Hypertension  - Continued on PTA olmesartan-hydrochlorothiazide.  - restart torsemide     Hyperlipidemia  *Prior history of significant myalgias with statins.  - Continued on PTA ezetimibe.    Chronic kidney disease, stage 3  *Creatinine ranges between 1.10-1.62 since 2023.    - stable     COPD  Denies shortness of breath, wheezing.  - Continued on PTA Symbicort or formulary equivalent.  - Continued on PTA Spiriva.  - Albuterol inhaler available PRN.       MDD with anxiety  Insomnia  -Continued on PTA fluoxetine, Risperdal, and trazodone.     Morbid obesity  BMI 43.89 kg/m2 per outside records.      Anemia, mild   -Monitor.      Mitral stenosis  Elevated pulmonary pressures  -Outpatient follow up.      Heart murmur  *Patient has never been told she has a murmur.  She had an ECHO completed at [place] 7/28.    -No interventions.      Clinically Significant Risk Factors              # Hypoalbuminemia: Lowest albumin = 2.9 g/dL at 7/31/2024  6:36 AM, will monitor as appropriate           # Severe Obesity: Estimated body mass index is 43.77 kg/m  as calculated from the following:    Height as of this encounter: 1.626 m (5' 4\").    Weight as of this encounter: 115.7 kg (255 lb).    # Severe Obesity: Estimated body mass index is 43.77 kg/m  as calculated from the following:    Height as of this encounter: 1.626 m (5' 4\").    Weight as of this encounter: 115.7 kg (255 lb).             Diet: Advance Diet as Tolerated: Regular Diet Adult  Room Service     DVT Prophylaxis: Pneumatic Compression Devices and ASA 81 mg BID   Fuchs Catheter: Not present  Lines: PRESENT      PICC 08/03/24 Single Lumen Right Basilic Long term abx-Site Assessment: WDL    Cardiac Monitoring: None  Code Status: Full Code       Disposition Plan        Medically Ready for Discharge: Anticipated Tomorrow    The patient's care was discussed with the Bedside Nurse and Patient.        DO DENISHA [facility]  Securely " "message with the Vocera Web Console (learn more here)      Interval History   Patient in bed. She reports ongoing pain to her L knee everywhere. She cannot walk well and can't believe we would talk about discuss like this but educated her on the need for rehab after this not hospital care. She denies GI complaints. Rash noted to her L cheek.     -Data reviewed today: I reviewed all new labs and imaging results over the last 24 hours. I personally reviewed no images or EKG's today.    Physical Exam   BP (!) 158/89 (BP Location: Left arm)   Pulse 97   Temp 98.7  F (37.1  C) (Oral)   Resp 17   Ht 1.626 m (5' 4\")   Wt 115.7 kg (255 lb)   SpO2 92%   BMI 43.77 kg/m      Constitutional: Awake, alert, cooperative, no apparent distress, rash to L cheek looks like scabs or abrasion  Respiratory: Clear to auscultation bilaterally, no crackles or wheezing  Cardiovascular: soft systolic ejection murmur  GI: Normal bowel sounds, soft, non-distended, non-tender  Other: L leg with knee immobilizer in place, moving toes    Medical Decision Making       38 MINUTES SPENT BY ME on the date of service doing chart review, history, exam, documentation & further activities per the note.         Medications   Current Facility-Administered Medications   Medication Dose Route Frequency Provider Last Rate Last Admin     Current Facility-Administered Medications   Medication Dose Route Frequency Provider Last Rate Last Admin    ampicillin-sulbactam (UNASYN) 3 g vial to attach to  mL bag  3 g Intravenous Q6H Ben De Oliveira MD   3 g at 08/04/24 1024    aspirin EC tablet 81 mg  81 mg Oral BID Mingo Sanchez MD   81 mg at 08/04/24 0818    ezetimibe (ZETIA) tablet 10 mg  10 mg Oral Daily Ben De Oliveira MD   10 mg at 08/04/24 0818    FLUoxetine (PROzac) capsule 40 mg  40 mg Oral BID Ben De Oliveira MD   40 mg at 08/04/24 0818    fluticasone-vilanterol (BREO ELLIPTA) 100-25 MCG/ACT inhaler 1 puff  1 puff " Inhalation Daily Ben De Oliveira MD   1 puff at 08/04/24 0817    gabapentin (NEURONTIN) capsule 900 mg  900 mg Oral TID Ben De Oliveira MD   900 mg at 08/04/24 0817    losartan-hydrochlorothiazide (HYZAAR) 50-12.5 MG per tablet 1 tablet  1 tablet Oral Daily Ben De Oliveira MD   1 tablet at 08/04/24 0817    miconazole (MICATIN) 2 % powder   Topical BID Ben De Oliveira MD   Given at 08/04/24 0816    polyethylene glycol (MIRALAX) Packet 17 g  17 g Oral Daily Mingo Sanchez MD   17 g at 08/03/24 0902    risperiDONE (risperDAL) tablet 1 mg  1 mg Oral TID Ben De Oliveira MD   1 mg at 08/04/24 0817    senna-docusate (SENOKOT-S/PERICOLACE) 8.6-50 MG per tablet 1 tablet  1 tablet Oral BID Mingo Sanchez MD   1 tablet at 08/04/24 0818    sodium chloride (PF) 0.9% PF flush 3 mL  3 mL Intracatheter Q8H Mingo Sanchez MD   3 mL at 08/04/24 0817    torsemide (DEMADEX) tablet 20 mg  20 mg Oral Daily Adilia Oliver DO   20 mg at 08/04/24 0819    traZODone (DESYREL) tablet 100 mg  100 mg Oral At Bedtime Ben De Oliveira MD   100 mg at 08/03/24 2211    umeclidinium (INCRUSE ELLIPTA) 62.5 MCG/ACT inhaler 1 puff  1 puff Inhalation QAM Ben De Oliveira MD   1 puff at 08/04/24 0817       Data   Recent Labs   Lab 08/03/24  0719 08/02/24  0733 08/01/24  0713 07/31/24  0636   WBC 8.1  --  8.0 7.1   HGB 10.4* 9.9* 10.0* 11.0*   MCV 99  --  101* 99     --  190 199    139 138 136   POTASSIUM 4.3 4.2 4.8 4.0   CHLORIDE 102 104 102 99   CO2 29 29 29 28   BUN 16.2 22.1 18.4 18.9   CR 0.97* 1.19* 1.13* 1.03*   ANIONGAP 5* 6* 7 9   BRAYAN 8.4* 8.2* 8.1* 8.6*   * 106* 124* 98   ALBUMIN  --   --   --  2.9*   PROTTOTAL  --   --   --  6.2*   BILITOTAL  --   --   --  0.4   ALKPHOS  --   --   --  60   ALT  --   --   --  51*   AST  --   --   --  99*       No results found for this or any previous visit (from the past 24 hour(s)).

## 2024-08-05 ENCOUNTER — APPOINTMENT (OUTPATIENT)
Dept: GENERAL RADIOLOGY | Facility: CLINIC | Age: 74
DRG: 486 | End: 2024-08-05
Attending: PHYSICIAN ASSISTANT
Payer: COMMERCIAL

## 2024-08-05 ENCOUNTER — APPOINTMENT (OUTPATIENT)
Dept: PHYSICAL THERAPY | Facility: CLINIC | Age: 74
DRG: 486 | End: 2024-08-05
Attending: INTERNAL MEDICINE
Payer: COMMERCIAL

## 2024-08-05 LAB
ANION GAP SERPL CALCULATED.3IONS-SCNC: 10 MMOL/L (ref 7–15)
BACTERIA BLD CULT: NO GROWTH
BACTERIA TISS BX CULT: ABNORMAL
BACTERIA TISS BX CULT: NO GROWTH
BUN SERPL-MCNC: 14.8 MG/DL (ref 8–23)
CALCIUM SERPL-MCNC: 8.8 MG/DL (ref 8.8–10.4)
CHLORIDE SERPL-SCNC: 95 MMOL/L (ref 98–107)
CREAT SERPL-MCNC: 1.12 MG/DL (ref 0.51–0.95)
EGFRCR SERPLBLD CKD-EPI 2021: 52 ML/MIN/1.73M2
GLUCOSE SERPL-MCNC: 108 MG/DL (ref 70–99)
HCO3 SERPL-SCNC: 29 MMOL/L (ref 22–29)
POTASSIUM SERPL-SCNC: 4.3 MMOL/L (ref 3.4–5.3)
SODIUM SERPL-SCNC: 134 MMOL/L (ref 135–145)

## 2024-08-05 PROCEDURE — 250N000013 HC RX MED GY IP 250 OP 250 PS 637: Performed by: HOSPITALIST

## 2024-08-05 PROCEDURE — 73630 X-RAY EXAM OF FOOT: CPT | Mod: RT

## 2024-08-05 PROCEDURE — 97530 THERAPEUTIC ACTIVITIES: CPT | Mod: GP | Performed by: PHYSICAL THERAPIST

## 2024-08-05 PROCEDURE — 120N000001 HC R&B MED SURG/OB

## 2024-08-05 PROCEDURE — 80048 BASIC METABOLIC PNL TOTAL CA: CPT | Performed by: STUDENT IN AN ORGANIZED HEALTH CARE EDUCATION/TRAINING PROGRAM

## 2024-08-05 PROCEDURE — 250N000013 HC RX MED GY IP 250 OP 250 PS 637: Performed by: STUDENT IN AN ORGANIZED HEALTH CARE EDUCATION/TRAINING PROGRAM

## 2024-08-05 PROCEDURE — 250N000011 HC RX IP 250 OP 636: Performed by: HOSPITALIST

## 2024-08-05 PROCEDURE — 99232 SBSQ HOSP IP/OBS MODERATE 35: CPT | Performed by: STUDENT IN AN ORGANIZED HEALTH CARE EDUCATION/TRAINING PROGRAM

## 2024-08-05 PROCEDURE — 250N000013 HC RX MED GY IP 250 OP 250 PS 637: Performed by: ORTHOPAEDIC SURGERY

## 2024-08-05 RX ADMIN — LOSARTAN POTASSIUM AND HYDROCHLOROTHIAZIDE 1 TABLET: 50; 12.5 TABLET, FILM COATED ORAL at 08:35

## 2024-08-05 RX ADMIN — RISPERIDONE 1 MG: 1 TABLET ORAL at 21:55

## 2024-08-05 RX ADMIN — RISPERIDONE 1 MG: 1 TABLET ORAL at 18:05

## 2024-08-05 RX ADMIN — GABAPENTIN 900 MG: 300 CAPSULE ORAL at 08:33

## 2024-08-05 RX ADMIN — AMPICILLIN SODIUM AND SULBACTAM SODIUM 3 G: 2; 1 INJECTION, POWDER, FOR SOLUTION INTRAMUSCULAR; INTRAVENOUS at 10:30

## 2024-08-05 RX ADMIN — AMPICILLIN SODIUM AND SULBACTAM SODIUM 3 G: 2; 1 INJECTION, POWDER, FOR SOLUTION INTRAMUSCULAR; INTRAVENOUS at 05:12

## 2024-08-05 RX ADMIN — FLUTICASONE FUROATE AND VILANTEROL 1 PUFF: 100; 25 POWDER RESPIRATORY (INHALATION) at 08:33

## 2024-08-05 RX ADMIN — AMPICILLIN SODIUM AND SULBACTAM SODIUM 3 G: 2; 1 INJECTION, POWDER, FOR SOLUTION INTRAMUSCULAR; INTRAVENOUS at 18:04

## 2024-08-05 RX ADMIN — ASPIRIN 81 MG: 81 TABLET, COATED ORAL at 21:55

## 2024-08-05 RX ADMIN — GABAPENTIN 900 MG: 300 CAPSULE ORAL at 21:55

## 2024-08-05 RX ADMIN — SENNOSIDES AND DOCUSATE SODIUM 1 TABLET: 8.6; 5 TABLET ORAL at 21:55

## 2024-08-05 RX ADMIN — MICONAZOLE NITRATE: 2 POWDER TOPICAL at 21:54

## 2024-08-05 RX ADMIN — EZETIMIBE 10 MG: 10 TABLET ORAL at 08:34

## 2024-08-05 RX ADMIN — GABAPENTIN 900 MG: 300 CAPSULE ORAL at 18:05

## 2024-08-05 RX ADMIN — AMPICILLIN SODIUM AND SULBACTAM SODIUM 3 G: 2; 1 INJECTION, POWDER, FOR SOLUTION INTRAMUSCULAR; INTRAVENOUS at 21:58

## 2024-08-05 RX ADMIN — FLUOXETINE HYDROCHLORIDE 40 MG: 20 CAPSULE ORAL at 21:55

## 2024-08-05 RX ADMIN — TORSEMIDE 20 MG: 20 TABLET ORAL at 08:35

## 2024-08-05 RX ADMIN — HYDROMORPHONE HYDROCHLORIDE 2 MG: 2 TABLET ORAL at 18:05

## 2024-08-05 RX ADMIN — SENNOSIDES AND DOCUSATE SODIUM 1 TABLET: 8.6; 5 TABLET ORAL at 08:34

## 2024-08-05 RX ADMIN — TRAZODONE HYDROCHLORIDE 100 MG: 100 TABLET ORAL at 21:55

## 2024-08-05 RX ADMIN — MICONAZOLE NITRATE: 2 POWDER TOPICAL at 08:35

## 2024-08-05 RX ADMIN — FLUOXETINE HYDROCHLORIDE 40 MG: 20 CAPSULE ORAL at 08:33

## 2024-08-05 RX ADMIN — RISPERIDONE 1 MG: 1 TABLET ORAL at 08:33

## 2024-08-05 RX ADMIN — ASPIRIN 81 MG: 81 TABLET, COATED ORAL at 08:34

## 2024-08-05 ASSESSMENT — ACTIVITIES OF DAILY LIVING (ADL)
ADLS_ACUITY_SCORE: 38

## 2024-08-05 NOTE — PLAN OF CARE
Diagnosis: fall, left knee infectinon 7/31 Left knee I&D, poly exchange  POD#: 5  Mental Status: A&O x 4  Activity/dangle up with 2 & Sylvia Steady  Diet:Regular  Pain:  po dilaudid, scheduled tylenol  Fuchs/Voiding: voiding BR, purewick   Tele/Restraints/Iso: NA  02/LDA: PICC RUE  D/C Date ? Pending TCU bed   Other Info: KI to LLE, prevena vac to left incision.  Wound to Right/left calf, coccyx, right elbow, abrasion to left cheek.

## 2024-08-05 NOTE — PROVIDER NOTIFICATION
Notified hospitalist on call about 1915 about PICC orderset and flushes. Ordered at 1927. Inquired about whether she needed the heparin that was ordered since she has a valved PICC. No response.

## 2024-08-05 NOTE — PLAN OF CARE
Goal Outcome Evaluation:      Diagnosis:I&D left knee with poly exchange  POD#: 4  Mental Status: A/O x4  Activity/dangle: 2 sonya steady  Diet: Regular  Pain:   Fuchs/Voiding: Purewick  Tele/Restraints/Iso: NA  02/LDA: RA/ PICC RUE in place / Prevena wound vac intact  D/C Date:Pending  Other Info: Immobilizer to L leg in place.On intermittent IV abx.

## 2024-08-05 NOTE — PROGRESS NOTES
[facility]  Hospitalist Progress Note    Assessment & Plan   Helga Geronimo is a 74 year old female who was directly admitted to [facility] on 7/30/2024 due to bacteremia and left TKA prosthetic joint infection.     Past medical history significant for HTN, HLP, CKD stage III, COPD, MDD with anxiety, Obesity, Bilateral lower extremity lymphedema.       Pasteurella bacteremia  Left TKA prosthetic joint infection s/p left knee irrigation and debridement with polyethylene exchange and Pravena Wound Vac placement (14 days)  Left lower extremity cellulitis  Bilateral lower extremity lymphedema  Patient slipped and fell prior to admission. Developed left knee pain after the fall. Had erythema of left knee/leg at time of admission. History of bilateral lower extremity lymphedema and wounds. Blood cultures from [place] ER positive for Pasteurella. S/p left knee arthrocentesis - culture pending, cell count with 68,000 nucleated cells (94% neutrophils). Transferred from [facility] to [facility] on 7/30/24 for further evaluation and management by Dr. Yu, who is the patient's primary Orthopedic Surgeon that performed her initial left TKA in August 2020.  *Left knee aspirate with monosodium urates present as well      -Ortho following guiding management, mobility, pain management and DVT prophylaxis (ASA 81 mg BID) PHOEBE drain pulled 08/02  - Infectious Disease consult appreciated:  Will plan for unasyn on discharge due to intra op staph lugdunensis and pastuerella bacteremia.   - Plan for TCU at discharge SW aware. Will need [facility] prior auth before discharge to TCU.    Chronic lower extremity wounds  Right elbow wound secondary to pressure, friction and trauma  Left cheek and eye wound (traumatic)  Coccyx injury secondary to friction, intertrigo, and MASD  - [facility] nurse consult appreciated.     Rhabdomyolysis (Resolving)  Fall with prolonged time on floor  Secondary to fall and laying on  "the floor overnight.    *CK: 9,563-->5,334-->2,435-->711.  - stop IVF     Hypertension  - Continued on PTA olmesartan-hydrochlorothiazide.  - restarted torsemide 08/04     Hyperlipidemia  *Prior history of significant myalgias with statins.  - Continued on PTA ezetimibe.    Chronic kidney disease, stage 3  *Creatinine ranges between 1.10-1.62 since 2023.    - stable     COPD  Denies shortness of breath, wheezing.  - Continued on PTA Symbicort or formulary equivalent.  - Continued on PTA Spiriva.  - Albuterol inhaler available PRN.       MDD with anxiety  Insomnia  -Continued on PTA fluoxetine, Risperdal, and trazodone.     Morbid obesity  BMI 43.89 kg/m2 per outside records.    Chronic Anemia, mild   -stable     Mitral stenosis  Elevated pulmonary pressures  -Outpatient follow up.      Heart murmur  *Patient has never been told she has a murmur.  She had an ECHO completed at [place] 7/28.    -No interventions.      Clinically Significant Risk Factors              # Hypoalbuminemia: Lowest albumin = 2.9 g/dL at 7/31/2024  6:36 AM, will monitor as appropriate           # Severe Obesity: Estimated body mass index is 43.77 kg/m  as calculated from the following:    Height as of this encounter: 1.626 m (5' 4\").    Weight as of this encounter: 115.7 kg (255 lb).    # Severe Obesity: Estimated body mass index is 43.77 kg/m  as calculated from the following:    Height as of this encounter: 1.626 m (5' 4\").    Weight as of this encounter: 115.7 kg (255 lb).             Diet: Advance Diet as Tolerated: Regular Diet Adult  Room Service     DVT Prophylaxis: Pneumatic Compression Devices and ASA 81 mg BID   Fuchs Catheter: Not present  Lines: PRESENT      PICC 08/03/24 Single Lumen Right Basilic Long term abx-Site Assessment: WDL    Cardiac Monitoring: None  Code Status: Full Code       Disposition Plan        Medically Ready for Discharge: Anticipated Today    The patient's care was discussed with the Bedside Nurse and " "Patient.        Adilia Oliver DO  [facility]  Securely message with the Vocera Web Console (learn more here)      Interval History   Patient in bed. Ongoing pain ot her L knee which is mostly unchanged. She is lacking motivation to get out of bed. PT in the room with me. She denies GI issues.      -Data reviewed today: I reviewed all new labs and imaging results over the last 24 hours. I personally reviewed no images or EKG's today.    Physical Exam   /71 (BP Location: Left arm)   Pulse 86   Temp 98.4  F (36.9  C) (Oral)   Resp 18   Ht 1.626 m (5' 4\")   Wt 115.7 kg (255 lb)   SpO2 91%   BMI 43.77 kg/m      Constitutional: Awake, alert, cooperative, no apparent distress, abrasion to L eyelid and cheek  Respiratory: Clear to auscultation bilaterally, no crackles or wheezing  Cardiovascular: soft systolic ejection murmur  GI: Normal bowel sounds, soft, non-distended, non-tender  Other: L leg with knee immobilizer in place, moving toes    Medical Decision Making       40 MINUTES SPENT BY ME on the date of service doing chart review, history, exam, documentation & further activities per the note.         Medications   Current Facility-Administered Medications   Medication Dose Route Frequency Provider Last Rate Last Admin     Current Facility-Administered Medications   Medication Dose Route Frequency Provider Last Rate Last Admin    ampicillin-sulbactam (UNASYN) 3 g vial to attach to  mL bag  3 g Intravenous Q6H Ben De Oliveira MD   3 g at 08/05/24 1030    aspirin EC tablet 81 mg  81 mg Oral BID Mingo Sanchez MD   81 mg at 08/05/24 0834    ezetimibe (ZETIA) tablet 10 mg  10 mg Oral Daily Ben De Oliveira MD   10 mg at 08/05/24 0834    FLUoxetine (PROzac) capsule 40 mg  40 mg Oral BID Ben De Oliveira MD   40 mg at 08/05/24 0833    fluticasone-vilanterol (BREO ELLIPTA) 100-25 MCG/ACT inhaler 1 puff  1 puff Inhalation Daily Ben De Oliveira" MD Malick   1 puff at 08/05/24 0833    gabapentin (NEURONTIN) capsule 900 mg  900 mg Oral TID Ben De Oliveira MD   900 mg at 08/05/24 0833    losartan-hydrochlorothiazide (HYZAAR) 50-12.5 MG per tablet 1 tablet  1 tablet Oral Daily Ben De Oliveira MD   1 tablet at 08/05/24 0835    miconazole (MICATIN) 2 % powder   Topical BID Ben De Oliveira MD   Given at 08/05/24 0835    polyethylene glycol (MIRALAX) Packet 17 g  17 g Oral Daily Mingo Sanchez MD   17 g at 08/03/24 0902    risperiDONE (risperDAL) tablet 1 mg  1 mg Oral TID Ben De Oliveira MD   1 mg at 08/05/24 0833    senna-docusate (SENOKOT-S/PERICOLACE) 8.6-50 MG per tablet 1 tablet  1 tablet Oral BID Mingo Sanchez MD   1 tablet at 08/05/24 0834    sodium chloride (PF) 0.9% PF flush 10 mL  10 mL Intracatheter Q8H Mingo Sanchez MD   10 mL at 08/05/24 0834    sodium chloride (PF) 0.9% PF flush 10-40 mL  10-40 mL Intracatheter Q8H Efe Cox MD   10 mL at 08/05/24 1030    torsemide (DEMADEX) tablet 20 mg  20 mg Oral Daily Adilia Oliver, DO   20 mg at 08/05/24 0835    traZODone (DESYREL) tablet 100 mg  100 mg Oral At Bedtime Ben De Oliveira MD   100 mg at 08/04/24 2211    umeclidinium (INCRUSE ELLIPTA) 62.5 MCG/ACT inhaler 1 puff  1 puff Inhalation QAM Ben De Oliveira MD   1 puff at 08/03/24 0903       Data   Recent Labs   Lab 08/04/24  1151 08/03/24  0719 08/02/24  0733 08/01/24  0713 07/31/24  0636   WBC  --  8.1  --  8.0 7.1   HGB  --  10.4* 9.9* 10.0* 11.0*   MCV  --  99  --  101* 99   PLT  --  253  --  190 199   NA  --  136 139 138 136   POTASSIUM  --  4.3 4.2 4.8 4.0   CHLORIDE  --  102 104 102 99   CO2  --  29 29 29 28   BUN  --  16.2 22.1 18.4 18.9   CR  --  0.97* 1.19* 1.13* 1.03*   ANIONGAP  --  5* 6* 7 9   BRAYAN  --  8.4* 8.2* 8.1* 8.6*   * 113* 106* 124* 98   ALBUMIN  --   --   --   --  2.9*   PROTTOTAL  --   --   --   --  6.2*   BILITOTAL  --   --   --   --  0.4    ALKPHOS  --   --   --   --  60   ALT  --   --   --   --  51*   AST  --   --   --   --  99*       No results found for this or any previous visit (from the past 24 hour(s)).

## 2024-08-05 NOTE — PROVIDER NOTIFICATION
Patient has PICC inserted last night. Need flushing and PICC care orders. Message sent to on call hospitalist Kenny Snider. awaiting call back or orders

## 2024-08-05 NOTE — PROGRESS NOTES
Orthopedic Surgery  Helga Geronimo  08/04/2024     Admit Date:  7/30/2024  POD: 4 Days Post-Op   Procedure(s):  Irrigation and debridement with polyethylene exchange      Patient resting in bed currently.  Breathing comfortably on room air.    Pain is currently rated a 7/10 at rest. Knee immobilizer in place.  She states the discomfort near her left achilles is improved and no longer bothersome.   Tolerating oral intake.    Denies nausea or vomiting  Denies chest pain or shortness of breath    Temp:  [98.3  F (36.8  C)-98.7  F (37.1  C)] 98.7  F (37.1  C)  Pulse:  [95-97] 97  Resp:  [17-18] 18  BP: (123-158)/(66-89) 158/89  SpO2:  [92 %-93 %] 93 %    Alert and oriented, no distress, appears comfortable.  Prevena incisional vac in place and functioning  Ace wrap in place and dry.    Was able to peel back and visualize dressing which was clean, dry, and intact.    Bilateral calves are soft, non-tender.  Left lower extremity is NVI.  Sensation intact distally to bilateral lower extremities.  Patient able to resist dorsi and plantar flexion bilaterally  +Dp pulse    Labs:  Recent Labs   Lab Test 08/03/24  0719 08/02/24  0733 08/01/24  0713 07/31/24  0636   WBC 8.1  --  8.0 7.1   HGB 10.4* 9.9* 10.0* 11.0*     --  190 199     No lab results found.  Recent Labs   Lab Test 08/03/24  0719 07/31/24  0636   CRPI 83.05* 203.30*     Knee cultures:  Gram stain: no orgs  Cultures.  No growth      1. Plan:  Activity: WBAT with assist, KI at all times x 2 weeks   Dressing + changes: Prevena wound vac x 14 days. Keep prevena in place.    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  Dispo: TCU  F/U: 2 weeks w/ Dr. Sanchez's team.    Patient noted 7/10 pain though I noticed she really hasn't had pain medication today.  Encourage to continue scheduled tylenol  and to utilize adjunct options as needed.    2. Disposition   Anticipate d/c to TCU when medically cleared and progressing in PT.    Fabiola Munoz PA-C

## 2024-08-05 NOTE — PROGRESS NOTES
Care Management Follow Up    Length of Stay (days): 6    Expected Discharge Date: 08/06/2024     Concerns to be Addressed:       Patient plan of care discussed at interdisciplinary rounds: Yes    Anticipated Discharge Disposition: Transitional Care     Anticipated Discharge Services:    Anticipated Discharge DME: [facility]    Patient/family educated on Medicare website which has current facility and service quality ratings: yes  Education Provided on the Discharge Plan: Yes  Patient/Family in Agreement with the Plan: yes    Referrals Placed by CM/SW: Post Acute Facilities  Private pay costs discussed: Not applicable    Additional Information:  Message sent to [facility] via [employer] regarding referral. Spoke to patient and updated that [facility] and [facility] accepted patient. She would prefer [facility] as it's closer to her home and it's a private room. Writer noted that if [facility] doesn't answer by 1200 then bed with [facility] will be confirmed so auth can be started.     1200: Writer messaged Dr Oliver as writer was told that [facility] is waiving auth until 2025. Spoke to patient who asked about [facility] in [place] as it will be closer to her daughter. Called [facility] and they may have a bed and requested a referral to be sent. Patient also confirmed that she will need a ride set up for transport.     APPLE Bautista

## 2024-08-05 NOTE — PROGRESS NOTES
[facility]    Infectious Disease Progress Note    Date of Service (when I saw the patient): 08/05/2024     Assessment & Plan   Helga Geronimo is a 74 year old female who was admitted on 7/30/2024.     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    She has 1 cat but no history of a bite   The aspiration from the left knee was significant for a total nucleated cell count of 68,000 cells with 94% neutrophils.  Cultures from the aspiration are pending.    She is subsequently transferred to [facility] for further evaluation and management   She is currently on Unasyn  S/p left knee I&D with poly exchange  And stain here noted for gram-positive cocci in broth only     Recommendations:  Continue  Unasyn  Follow-up for review blood cultures negative  Follow-up on the OR cultures surprisingly growing staph lugdunensis, obviously this infection should be the bacteremic Pasteurella infection but this organism a true pathogen and must be covered sensitivity pending but nearly universally sensitive so likely Unasyn acceptable   OSH aspiration culture is negative, fits with the known Pasteurella as she was on antibiotics before that aspiration was done   PICC line, Unasyn a good choice looks like rehab as disposition so Unasyn should be able to be done there  Will need extended IV antibiotics okay to place PICC line, will also need extended oral antibiotics as has had exchange arthroplasty    Needs follow up in the ID clinic in 4- 5 weeks   Once done with IV Unasyn will need to be started on oral Augmentin 6 to 12 months        Harsh Mills MD    Interval History   Physical Exam   Temp: 98.4  F (36.9  C) Temp src: Oral BP: 133/71 Pulse: 86   Resp: 18 SpO2: 91 % O2 Device:  None (Room air)    Vitals:    07/31/24 1514   Weight: 115.7 kg (255 lb)     Vital Signs with Ranges  Temp:  [98.4  F (36.9  C)] 98.4  F (36.9  C)  Pulse:  [86-89] 86  Resp:  [18] 18  BP: (133-138)/(71-75) 133/71  SpO2:  [91 %-95 %] 91 %      Medications   Current Facility-Administered Medications   Medication Dose Route Frequency Provider Last Rate Last Admin     Current Facility-Administered Medications   Medication Dose Route Frequency Provider Last Rate Last Admin    ampicillin-sulbactam (UNASYN) 3 g vial to attach to  mL bag  3 g Intravenous Q6H Ben De Oliveira MD   3 g at 08/05/24 1030    aspirin EC tablet 81 mg  81 mg Oral BID Mingo Sanchez MD   81 mg at 08/05/24 0834    ezetimibe (ZETIA) tablet 10 mg  10 mg Oral Daily Ben De Oliveira MD   10 mg at 08/05/24 0834    FLUoxetine (PROzac) capsule 40 mg  40 mg Oral BID Ben De Oliveira MD   40 mg at 08/05/24 0833    fluticasone-vilanterol (BREO ELLIPTA) 100-25 MCG/ACT inhaler 1 puff  1 puff Inhalation Daily Ben De Oliveira MD   1 puff at 08/05/24 0833    gabapentin (NEURONTIN) capsule 900 mg  900 mg Oral TID Ben De Oliveira MD   900 mg at 08/05/24 0833    losartan-hydrochlorothiazide (HYZAAR) 50-12.5 MG per tablet 1 tablet  1 tablet Oral Daily Ben De Oliveira MD   1 tablet at 08/05/24 0835    miconazole (MICATIN) 2 % powder   Topical BID Ben De Oliveira MD   Given at 08/05/24 0835    polyethylene glycol (MIRALAX) Packet 17 g  17 g Oral Daily Mingo Sanchez MD   17 g at 08/03/24 0902    risperiDONE (risperDAL) tablet 1 mg  1 mg Oral TID Ben De Oliveira MD   1 mg at 08/05/24 0833    senna-docusate (SENOKOT-S/PERICOLACE) 8.6-50 MG per tablet 1 tablet  1 tablet Oral BID Mingo Sanchez MD   1 tablet at 08/05/24 0834    sodium chloride (PF) 0.9% PF flush 10 mL  10 mL Intracatheter Q8H Mingo Sanchez MD   10 mL at 08/05/24 0834    sodium chloride (PF) 0.9% PF flush 10-40 mL   "10-40 mL Intracatheter Q8H Efe Cox MD   10 mL at 08/05/24 1030    torsemide (DEMADEX) tablet 20 mg  20 mg Oral Daily Adilia Oliver DO   20 mg at 08/05/24 0835    traZODone (DESYREL) tablet 100 mg  100 mg Oral At Bedtime Ben De Oliveira MD   100 mg at 08/04/24 2211    umeclidinium (INCRUSE ELLIPTA) 62.5 MCG/ACT inhaler 1 puff  1 puff Inhalation QAM Ben De Oliveira MD   1 puff at 08/03/24 0903       Data   All microbiology laboratory data reviewed.  Recent Labs   Lab Test 08/03/24  0719 08/02/24  0733 08/01/24  0713 07/31/24  0636   WBC 8.1  --  8.0 7.1   HGB 10.4* 9.9* 10.0* 11.0*   HCT 31.5*  --  31.2* 32.3*   MCV 99  --  101* 99     --  190 199     Recent Labs   Lab Test 08/03/24  0719 08/02/24  0733 08/01/24  0713   CR 0.97* 1.19* 1.13*     Recent Labs   Lab Test 08/03/24  0719   SED 73*     No lab results found.    Invalid input(s): \"UC\"    All cultures:  Recent Labs   Lab 07/31/24  1655 07/31/24  1653 07/31/24  1633 07/31/24  0636   CULTURE No Growth  No Growth  No Growth  No anaerobic organisms isolated after 4 days  No anaerobic organisms isolated after 4 days  No anaerobic organisms isolated after 4 days  See corresponding culture for results  See corresponding culture for results  See corresponding culture for results No Growth  No anaerobic organisms isolated after 4 days  See corresponding culture for results Isolated in broth only Staphylococcus lugdunensis*  No anaerobic organisms isolated after 4 days  See corresponding culture for results No Growth      Blood culture:  Results for orders placed or performed during the hospital encounter of 07/30/24   Blood Culture Arm, Right    Specimen: Arm, Right; Blood   Result Value Ref Range    Culture No Growth    Results for orders placed or performed in visit on 12/10/09   Blood culture   Result Value Ref Range    Specimen Description Blood SITE NOT SPECIFIED     Culture Micro No growth after 6 days     " Micro Report Status FINAL [id number]       Urine culture:  Results for orders placed or performed in visit on 12/09/09   Urine culture   Result Value Ref Range    Specimen Description Catheterized Urine     Culture Micro No growth     Micro Report Status FINAL 12/11/2009

## 2024-08-05 NOTE — PLAN OF CARE
Goal Outcome Evaluation:      Plan of Care Reviewed With: patient    Overall Patient Progress: no changeOverall Patient Progress: no change  Date/Time 8/4 lucas    Trauma/Ortho/Medical (Choose one) ortho    Diagnosis: I&D left knee with poly exchange  POD#:4  Mental Status: a&o  Activity/dangle up with sonya steady and 2 assist  Diet:regular  Pain:no c/o  Fuchs/Voiding:purewick when in bed  Tele/Restraints/Iso:mo  02/LDA: ra, right PICC  D/C Date:? monday  Other Info:immobilizer to left leg. Ace wrap clean and dry. Prevena in place. CHG wips done for PICC

## 2024-08-06 VITALS
BODY MASS INDEX: 43.54 KG/M2 | SYSTOLIC BLOOD PRESSURE: 107 MMHG | WEIGHT: 255 LBS | DIASTOLIC BLOOD PRESSURE: 69 MMHG | HEART RATE: 90 BPM | HEIGHT: 64 IN | OXYGEN SATURATION: 90 % | RESPIRATION RATE: 16 BRPM | TEMPERATURE: 98.1 F

## 2024-08-06 PROCEDURE — 250N000013 HC RX MED GY IP 250 OP 250 PS 637: Performed by: STUDENT IN AN ORGANIZED HEALTH CARE EDUCATION/TRAINING PROGRAM

## 2024-08-06 PROCEDURE — 250N000011 HC RX IP 250 OP 636: Performed by: HOSPITALIST

## 2024-08-06 PROCEDURE — 250N000013 HC RX MED GY IP 250 OP 250 PS 637: Performed by: HOSPITALIST

## 2024-08-06 PROCEDURE — 250N000013 HC RX MED GY IP 250 OP 250 PS 637: Performed by: ORTHOPAEDIC SURGERY

## 2024-08-06 PROCEDURE — 99239 HOSP IP/OBS DSCHRG MGMT >30: CPT | Performed by: HOSPITALIST

## 2024-08-06 RX ADMIN — FLUTICASONE FUROATE AND VILANTEROL 1 PUFF: 100; 25 POWDER RESPIRATORY (INHALATION) at 09:11

## 2024-08-06 RX ADMIN — ACETAMINOPHEN 650 MG: 325 TABLET, FILM COATED ORAL at 09:06

## 2024-08-06 RX ADMIN — HYDROMORPHONE HYDROCHLORIDE 2 MG: 2 TABLET ORAL at 06:23

## 2024-08-06 RX ADMIN — FLUOXETINE HYDROCHLORIDE 40 MG: 20 CAPSULE ORAL at 09:06

## 2024-08-06 RX ADMIN — EZETIMIBE 10 MG: 10 TABLET ORAL at 09:06

## 2024-08-06 RX ADMIN — METHOCARBAMOL 500 MG: 500 TABLET ORAL at 09:06

## 2024-08-06 RX ADMIN — AMPICILLIN SODIUM AND SULBACTAM SODIUM 3 G: 2; 1 INJECTION, POWDER, FOR SOLUTION INTRAMUSCULAR; INTRAVENOUS at 09:07

## 2024-08-06 RX ADMIN — ACETAMINOPHEN 650 MG: 325 TABLET, FILM COATED ORAL at 15:13

## 2024-08-06 RX ADMIN — GABAPENTIN 900 MG: 300 CAPSULE ORAL at 09:05

## 2024-08-06 RX ADMIN — GABAPENTIN 900 MG: 300 CAPSULE ORAL at 15:12

## 2024-08-06 RX ADMIN — RISPERIDONE 1 MG: 1 TABLET ORAL at 09:06

## 2024-08-06 RX ADMIN — METHOCARBAMOL 500 MG: 500 TABLET ORAL at 15:13

## 2024-08-06 RX ADMIN — RISPERIDONE 1 MG: 1 TABLET ORAL at 15:13

## 2024-08-06 RX ADMIN — LOSARTAN POTASSIUM AND HYDROCHLOROTHIAZIDE 1 TABLET: 50; 12.5 TABLET, FILM COATED ORAL at 09:10

## 2024-08-06 RX ADMIN — AMPICILLIN SODIUM AND SULBACTAM SODIUM 3 G: 2; 1 INJECTION, POWDER, FOR SOLUTION INTRAMUSCULAR; INTRAVENOUS at 03:52

## 2024-08-06 RX ADMIN — AMPICILLIN SODIUM AND SULBACTAM SODIUM 3 G: 2; 1 INJECTION, POWDER, FOR SOLUTION INTRAMUSCULAR; INTRAVENOUS at 15:13

## 2024-08-06 RX ADMIN — ASPIRIN 81 MG: 81 TABLET, COATED ORAL at 09:06

## 2024-08-06 RX ADMIN — TORSEMIDE 20 MG: 20 TABLET ORAL at 09:06

## 2024-08-06 ASSESSMENT — ACTIVITIES OF DAILY LIVING (ADL)
ADLS_ACUITY_SCORE: 37
ADLS_ACUITY_SCORE: 42
ADLS_ACUITY_SCORE: 38
ADLS_ACUITY_SCORE: 42
ADLS_ACUITY_SCORE: 37
ADLS_ACUITY_SCORE: 38
ADLS_ACUITY_SCORE: 38
ADLS_ACUITY_SCORE: 37
ADLS_ACUITY_SCORE: 42
ADLS_ACUITY_SCORE: 38
ADLS_ACUITY_SCORE: 42
ADLS_ACUITY_SCORE: 42
ADLS_ACUITY_SCORE: 37
ADLS_ACUITY_SCORE: 38
ADLS_ACUITY_SCORE: 38

## 2024-08-06 NOTE — PLAN OF CARE
700-1900 shift Overall Patient Progress :   Surgery/POD: POD6   Irrigation and debridement with polyethylene exchange Mingo Sanchez MD   Patient A/Ox4, VSS,RA. Prevena wound vac patent. Incision CDI, CMS intact. Good intake and output. Discharge to TCU between [id number].   Prevena  was place in the white bag with patient belongings.

## 2024-08-06 NOTE — PLAN OF CARE
Goal Outcome Evaluation:      Plan of Care Reviewed With: patient    Diagnosis: Fall, Left knee infection, I&D Left knee  POD#: 6  Mental Status: A&Ox4  Activity/dangle Assist 2 Sylvia steady   Diet: reg  Pain: PO Dilaudid  Fuchs/Voiding: purewick  Tele/Restraints/Iso: NA  02/LDA: YOEL RICH PICC  D/C Date: TCU today ?  Other Info: KI on LLE, prevena wound vac, scattered wounds, CMS intact

## 2024-08-06 NOTE — PLAN OF CARE
Physical Therapy Discharge Summary    Reason for therapy discharge:    Discharged to transitional care facility.    Progress towards therapy goal(s). See goals on Care Plan in [facility] electronic health record for goal details.  Goals not met.  Barriers to achieving goals:   discharge from facility.    Therapy recommendation(s):    Continued therapy is recommended.  Rationale/Recommendations:  To progress independence and safety with functional mobility.

## 2024-08-06 NOTE — PROGRESS NOTES
Diagnosis:L knee I/D polyexchange  POD#: 5  Mental Status:AxOx4  Activity/dangle: Ax2 lift-sarasteady   Diet:Regular, room service   Pain:Dilaudid   Fuchs/Voiding:External cath   02/LDA:YOEL PICC  D/C Date:TCU 8/6/24

## 2024-08-06 NOTE — DISCHARGE SUMMARY
"[facility]  Hospitalist Discharge Summary      Date of Admission:  7/30/2024  Date of Discharge:  8/6/2024  Discharging Provider: Cody Adkins MD  Discharge Service: Hospitalist Service    Discharge Diagnoses     Please refer to the hospital course below    Clinically Significant Risk Factors     # Severe Obesity: Estimated body mass index is 43.77 kg/m  as calculated from the following:    Height as of this encounter: 1.626 m (5' 4\").    Weight as of this encounter: 115.7 kg (255 lb).       Follow-ups Needed After Discharge   Follow-up Appointments     Follow Up and recommended labs and tests      Please call as soon as possible to make an appointment to be seen in Dr. Mingo Sanchez's clinic at 2 weeks postop for a recheck of your surgical   site, possible repeat x-rays, and wound care. If you are at a TCU at this   time and they have x-ray capabilities, you may complete your wound care   and x-rays at your TCU and have them send your images to Dr. Sanchez's   office. Please call [phone] to schedule.     Dr. Sanchez sees patients at 2 clinic locations:  [facility] [place]  [address] [place] [id number]    [facility] [place] [id number]      Please call the on-call phone number [phone] during evenings, nights   and weekends for any urgent needs. Prescription refills must be done   during business hours by calling Dr. Sanchez's care team at [phone].               Unresulted Labs Ordered in the Past 30 Days of this Admission       Date and Time Order Name Status Description    7/31/2024  5:03 PM Anaerobic Bacterial Culture Routine Preliminary     7/31/2024  5:03 PM Anaerobic Bacterial Culture Routine Preliminary     7/31/2024  5:02 PM Anaerobic Bacterial Culture Routine Preliminary     7/31/2024  4:53 PM Anaerobic Bacterial Culture Routine Preliminary     7/31/2024  4:35 PM Anaerobic Bacterial Culture Routine " Preliminary         These results will be followed up by Hospitalist/ID    Discharge Disposition   Discharged to TCU  Condition at discharge: Stable    Hospital Course      Helga Geronimo is a 74 year old female who was directly admitted to [facility] on 7/30/2024 due to bacteremia and left TKA prosthetic joint infection.      Past medical history significant for HTN, HLP, CKD stage III, COPD, MDD with anxiety, Obesity, Bilateral lower extremity lymphedema.       Pasteurella bacteremia  Left TKA prosthetic joint infection s/p left knee irrigation and debridement with polyethylene exchange and Pravena Wound Vac placement (14 days)  Left lower extremity cellulitis  Bilateral lower extremity lymphedema  Patient slipped and fell prior to admission. Developed left knee pain after the fall. Had erythema of left knee/leg at time of admission. History of bilateral lower extremity lymphedema and wounds. Blood cultures from [place] [facility] positive for Pasteurella. S/p left knee arthrocentesis - culture pending, cell count with 68,000 nucleated cells (94% neutrophils). Transferred from [facility] to [facility] on 7/30/24 for further evaluation and management by Dr. Yu, who is the patient's primary Orthopedic Surgeon that performed her initial left TKA in August 2020.  *Left knee aspirate with monosodium urates present as well       -Ortho following guiding management, mobility, pain management and DVT prophylaxis (ASA 81 mg BID) PHOEBE drain pulled 08/02  - Infectious Disease consult appreciated:  Plan is to continue unasyn on discharge due to intra op staph lugdunensis and pastuerella bacteremia.  IV antibiotic until 9/14.  PICC line to be removed once IV antibiotic duration completed.  Suppressive antibiotic therapy with p.o. Augmentin for 6-12 months, Augmentin to be started thereafter upon completion of IV antibiotic.   - discharging to TCU     Chronic lower extremity wounds  Right elbow wound secondary to  pressure, friction and trauma  Left cheek and eye wound (traumatic)  Coccyx injury secondary to friction, intertrigo, and MASD  - [facility] nurse consult appreciated.     Rhabdomyolysis (Resolving)  Fall with prolonged time on floor  Secondary to fall and laying on the floor overnight.    *CK: 9,563-->5,334-->2,435-->711.  -Now off IVF.     Hypertension  Mild hyponatremia  - Continued on PTA olmesartan-hydrochlorothiazide.  - restarted torsemide 08/04  -Sodium 134, patient on hydrochlorothiazide, follow-up BMP and if trends down then discontinue HCTZ.     Hyperlipidemia  *Prior history of significant myalgias with statins.  - Continued on PTA ezetimibe.     Chronic kidney disease, stage 3  *Creatinine ranges between 1.10-1.62 since 2023.    - stable     COPD  Denies shortness of breath, wheezing.  - Continued on PTA Symbicort or formulary equivalent.  - Continued on PTA Spiriva.  - Albuterol inhaler available PRN.       MDD with anxiety  Insomnia  -Continued on PTA fluoxetine, Risperdal, and trazodone.     Morbid obesity  BMI 43.89 kg/m2 per outside records.     Chronic Anemia, mild   -stable      Mitral stenosis  Elevated pulmonary pressures  -Outpatient follow up.       Heart murmur  *Patient has never been told she has a murmur.  She had an ECHO completed at [facility] 7/28.    -No interventions.      Consultations This Hospital Stay   INFECTIOUS DISEASES IP CONSULT  ORTHOPEDIC SURGERY IP CONSULT  WOUND OSTOMY CONTINENCE NURSE  IP CONSULT  CARE MANAGEMENT / SOCIAL WORK IP CONSULT  WOUND OSTOMY CONTINENCE NURSE  IP CONSULT  PHYSICAL THERAPY ADULT IP CONSULT  OCCUPATIONAL THERAPY ADULT IP CONSULT  PHYSICAL THERAPY ADULT IP CONSULT  OCCUPATIONAL THERAPY ADULT IP CONSULT  VASCULAR ACCESS ADULT IP CONSULT  VASCULAR ACCESS ADULT IP CONSULT  VASCULAR ACCESS ADULT IP CONSULT    Code Status   Full Code    Time Spent on this Encounter   Cody CARVAJAL MD, personally saw the patient today and spent greater than 30 minutes  discharging this patient.       Cody Adkins MD  [facility]  [address] [place] [address]  Phone: [phone]  Fax: [phone]  ______________________________________________________________________    Physical Exam   Vital Signs: Temp: 97.9  F (36.6  C) Temp src: Oral BP: 132/61 Pulse: 90   Resp: 16 SpO2: 90 % O2 Device: None (Room air)    Weight: 255 lbs 0 oz    General: AAOx3, appears comfortable.  HEENT: PERRLA EOMI. Mucosa moist.   Lungs: Bilateral equal air entry. Clear to auscultation, normal work of breathing.   CVS: S1S2 regular, no tachycardia or murmur.   Abdomen: Soft, NT, ND. BS heard.  MSK: Rt leg wrapped   Neuro: AAOX3. CN 2-12 normal. Strength symmetrical.  Skin: No rash.          Primary Care Physician   Jennie Morales    Discharge Orders      General info for SNF    Length of Stay Estimate: Short Term Care: Estimated # of Days <30  Condition at Discharge: Stable  Level of care:skilled   Rehabilitation Potential: Fair  Admission H&P remains valid and up-to-date: Yes  Recent Chemotherapy: N/A  Use Nursing Home Standing Orders: Yes     Mantoux instructions    Give two-step Mantoux (PPD) Per Facility Policy Yes     Follow Up and recommended labs and tests    Please call as soon as possible to make an appointment to be seen in Dr. Mingo Sanchez's clinic at 2 weeks postop for a recheck of your surgical site, possible repeat x-rays, and wound care. If you are at a TCU at this time and they have x-ray capabilities, you may complete your wound care and x-rays at your TCU and have them send your images to Dr. Sanchez's office. Please call [phone] to schedule.     Dr. Sanchez sees patients at 2 clinic locations:  [facility]  [address] [place] [address]    [facility] [address], [place] [address]      Please call the on-call phone number [phone] during evenings, nights and weekends for any  urgent needs. Prescription refills must be done during business hours by calling Dr. Sanchez's care team at [phone].     Reason for your hospital stay    S/p left TKA I&D and poly exchange (DOS: 7/31/24) performed by Dr. Mingo Sanchez     Wound care    Site:  Left knee  Instructions:  Prevena to remain clean, dry, and intact until 2 week postop visit with Dr. Sanchez. PHEOBE drain to be removed on POD#2 or when output <30 ml/shift (will be removed in hospital). Padding to remain intact, but okay to adjust as needed. Remove Ace wrap once daily for skin checks.     Additional Discharge Instructions    Pain after surgery is normal and expected.  You will have some amount of pain and swelling for several weeks after surgery.  These symptoms will improve with time.  There are several things you can do to help reduce your pain including: rest, cold compresses, elevation, and using pain medications as needed. Contact your Surgeon Team if you have pain that persists or worsens after surgery despite rest, ice, elevation, and taking your medication(s) as prescribed. Contact your Surgeon Team if you have new numbness, tingling, or weakness in your operative extremity.    Swelling and/or bruising of the surgical extremity is common and may persist for several months after surgery. In addition to frequent icing and elevation, gentle compressive support with an ACE wrap or tubigrip may help with swelling. Apply compression regularly, removing at least twice daily to perform skin checks. Contact your Surgeon Team if your swelling increases and is NOT associated with an increase in your activity level, or if your swelling increases and is associated with redness and pain.    Ice can be used to control swelling and discomfort after surgery. Place a thin towel over your operative site and apply the ice pack overtop. Leave ice pack in place for 20 minutes, then remove for 20 minutes. Repeat this 20 minutes on/20 minutes off routine  as often as tolerated.    Please contact your surgical team with any concerns for infection including increasing redness around your surgical site, pus-like drainage, fever, chills, or flu-like symptoms.     Activity - Up with assistive device     Activity - Up with nursing assistance     Weight bearing status    WBAT LLE with walker while wearing knee immobilizer. Knee immobilizer to remain in place at all times x 2 weeks postop, but okay to unstrap for skin checks/dressing changes. No left knee ROM.     Physical Therapy Adult Consult    Evaluate and treat as clinically indicated.    Reason: S/p left TKA I&D and poly exchange (DOS: 7/31/24)     Occupational Therapy Adult Consult    Evaluate and treat as clinically indicated.    Reason: S/p left TKA I&D and poly exchange (DOS: 7/31/24)     Fall precautions     Crutches DME    DME Documentation: Describe the reason for need to support medical necessity: Impaired gait status post knee surgery. I, the undersigned, certify that the above prescribed supplies are medically necessary for this patient and is both reasonable and necessary in reference to accepted standards of medical practice in the treatment of this patient's condition and is not prescribed as a convenience.     Cane DME    DME Documentation: Describe the reason for need to support medical necessity: Impaired gait status post knee surgery. I, the undersigned, certify that the above prescribed supplies are medically necessary for this patient and is both reasonable and necessary in reference to accepted standards of medical practice in the treatment of this patient's condition and is not prescribed as a convenience.     Walker DME    DME Documentation: Describe the reason for need to support medical necessity: Impaired gait status post knee surgery. I, the undersigned, certify that the above prescribed supplies are medically necessary for this patient and is both reasonable and necessary in reference to  accepted standards of medical practice in the treatment of this patient's condition and is not prescribed as a convenience.     Diet    Follow this diet upon discharge: Orders Placed This Encounter      Room Service      Advance Diet as Tolerated: Regular Diet Adult       Significant Results and Procedures   Most Recent 3 CBC's:  Recent Labs   Lab Test 08/03/24  0719 08/02/24  0733 08/01/24  0713 07/31/24  0636   WBC 8.1  --  8.0 7.1   HGB 10.4* 9.9* 10.0* 11.0*   MCV 99  --  101* 99     --  190 199     Most Recent 3 BMP's:  Recent Labs   Lab Test 08/05/24  1141 08/04/24  1151 08/03/24  0719 08/02/24  0733   *  --  136 139   POTASSIUM 4.3  --  4.3 4.2   CHLORIDE 95*  --  102 104   CO2 29  --  29 29   BUN 14.8  --  16.2 22.1   CR 1.12*  --  0.97* 1.19*   ANIONGAP 10  --  5* 6*   BRAYAN 8.8  --  8.4* 8.2*   * 104* 113* 106*     7-Day Micro Results       Collected Updated Procedure Result Status      07/31/2024 1655 08/05/2024 2031 Anaerobic Bacterial Culture Routine [[id number]]    Tissue from Knee, Left    Preliminary result Component Value   Culture No anaerobic organisms isolated after 5 days  [P]                07/31/2024 1655 07/31/2024 2053 Gram Stain [[id number]]   Tissue from Knee, Left    Final result Component Value   GS Culture See corresponding culture for results   Gram Stain Result No organisms seen   Gram Stain Result 1+ WBC seen            07/31/2024 1655 08/05/2024 0732 Tissue Aerobic Bacterial Culture Routine [[id number]]   Tissue from Knee, Left    Final result Component Value   Culture No Growth               07/31/2024 1655 08/05/2024 2046 Anaerobic Bacterial Culture Routine [[id number]]    Tissue from Knee, Left    Preliminary result Component Value   Culture No anaerobic organisms isolated after 5 days  [P]                07/31/2024 1655 07/31/2024 2052 Gram Stain [[id number]]   Tissue from Knee, Left    Final result Component Value   GS Culture See corresponding  culture for results   Gram Stain Result No organisms seen   Gram Stain Result 1+ WBC seen            07/31/2024 1655 08/05/2024 0732 Tissue Aerobic Bacterial Culture Routine [[id number]]   Tissue from Knee, Left    Final result Component Value   Culture No Growth               07/31/2024 1655 08/05/2024 2046 Anaerobic Bacterial Culture Routine [[id number]]    Tissue from Knee, Left    Preliminary result Component Value   Culture No anaerobic organisms isolated after 5 days  [P]                07/31/2024 1655 07/31/2024 2118 Gram Stain [[id number]]   Tissue from Knee, Left    Final result Component Value   GS Culture See corresponding culture for results   Gram Stain Result No organisms seen   Gram Stain Result 2+ WBC seen            07/31/2024 1655 08/05/2024 0732 Tissue Aerobic Bacterial Culture Routine [[id number]]   Tissue from Knee, Left    Final result Component Value   Culture No Growth               07/31/2024 1653 08/05/2024 2031 Anaerobic Bacterial Culture Routine [[id number]]    Tissue from Knee, Left    Preliminary result Component Value   Culture No anaerobic organisms isolated after 5 days  [P]                07/31/2024 1653 07/31/2024 2043 Gram Stain [[id number]]   Tissue from Knee, Left    Final result Component Value   GS Culture See corresponding culture for results   Gram Stain Result No organisms seen   Gram Stain Result 1+ WBC seen            07/31/2024 1653 08/05/2024 0732 Tissue Aerobic Bacterial Culture Routine [[id number]]   Tissue from Knee, Left    Final result Component Value   Culture No Growth               07/31/2024 1633 08/05/2024 2016 Anaerobic Bacterial Culture Routine [[id number]]   Tissue from Knee, Left    Preliminary result Component Value   Culture No anaerobic organisms isolated after 5 days  [P]                07/31/2024 1633 07/31/2024 2042 Gram Stain [[id number]]   Tissue from Knee, Left    Final result Component Value   GS Culture See corresponding  culture for results   Gram Stain Result No organisms seen   Gram Stain Result 1+ WBC seen            07/31/2024 1633 08/05/2024 0242 Tissue Aerobic Bacterial Culture Routine [[id number]]    (Abnormal)   Tissue from Knee, Left    Final result Component Value   Culture Isolated in broth only Staphylococcus lugdunensis    On day 2 of incubation        Susceptibility        Staphylococcus lugdunensis      KEERTHI      Clindamycin 0.25 ug/mL Susceptible      Daptomycin 0.25 ug/mL Susceptible      Doxycycline <=0.5 ug/mL Susceptible      Erythromycin <=0.25 ug/mL Susceptible      Gentamicin <=0.5 ug/mL Susceptible      Oxacillin 2 ug/mL Susceptible  [1]       Tetracycline <=1 ug/mL Susceptible      Trimethoprim/Sulfamethoxazole <=0.5/9.5 ug/mL Susceptible      Vancomycin <=0.5 ug/mL Susceptible                   [1]  Oxacillin susceptible isolates are susceptible to cephalosporins (example: cefazolin and cephalexin) and beta lactam combination agents. Oxacillin resistant isolates are resistant to these agents.                    07/31/2024 0636 08/05/2024 0704 Blood Culture Arm, Right [[id number]]   Blood from Arm, Right    Final result Component Value   Culture No Growth                     Most Recent TSH and T4:No lab results found.  Most Recent Hemoglobin A1c:No lab results found.  Most Recent 6 glucoses:  Recent Labs   Lab Test 08/05/24  1141 08/04/24  1151 08/03/24  0719 08/02/24  0733 08/01/24  0713 07/31/24  0636   * 104* 113* 106* 124* 98   ,   Results for orders placed or performed during the hospital encounter of 07/30/24   XR Knee Port Left 1/2 Views    Narrative    EXAM: XR KNEE PORT LEFT 1/2 VIEWS  LOCATION: [facility]  DATE: 7/31/2024    INDICATION: Post op total knee.  COMPARISON: None.      Impression    IMPRESSION: Left total knee arthroplasty. Excellent position and alignment of components. No evidence of complication or periprosthetic fracture.   XR Foot Right G/E 3  Views    Narrative    XR FOOT RIGHT G/E 3 VIEWS 8/5/2024 11:29 AM     HISTORY: Right dorsal foot pain, recent fall    COMPARISON: None.         Impression    IMPRESSION: Mild degenerative change at the talonavicular joint,  navicular cuneiform joints, and TMT joints. No evidence for acute  fracture or dislocation. No erosive changes. Plantar calcaneal  spurring    TIMO SWAIN MD         SYSTEM ID:  [id number]       Discharge Medications   Current Discharge Medication List        START taking these medications    Details   ampicillin-sulbactam in sodium chloride infusion Inject 400 mLs (12 g) into the vein every 24 hours for 40 days Check CBC/diff, AST, creat. Sed rate and crp weekly and fax results to Dr. Mills`s office.    Associated Diagnoses: Infection of prosthetic joint, initial encounter (H24)      methocarbamol (ROBAXIN) 500 MG tablet Take 1 tablet (500 mg) by mouth every 6 hours as needed for muscle spasms    Associated Diagnoses: Infection of prosthetic joint, initial encounter (H24)      oxyCODONE (ROXICODONE) 5 MG tablet Take 0.5-1 tablets (2.5-5 mg) by mouth every 4 hours as needed for moderate to severe pain (Take 2.5 mg (1/2 tab) for pain 4-6/10, take 5 mg (1 tab) for pain 7-10/10)  Qty: 20 tablet, Refills: 0    Associated Diagnoses: Infection of prosthetic joint, initial encounter (H24)      polyethylene glycol (MIRALAX) 17 GM/Dose powder Take 17 g by mouth daily    Associated Diagnoses: Infection of prosthetic joint, initial encounter (H24)      senna-docusate (SENOKOT-S/PERICOLACE) 8.6-50 MG tablet Take 1 tablet by mouth 2 times daily as needed for constipation    Associated Diagnoses: Infection of prosthetic joint, initial encounter (H24)           CONTINUE these medications which have CHANGED    Details   acetaminophen (TYLENOL) 325 MG tablet Take 3 tablets (975 mg) by mouth every 8 hours as needed for mild pain    Associated Diagnoses: Infection of prosthetic joint, initial encounter (H24)       aspirin 81 MG EC tablet Take 1 tablet (81 mg) by mouth 2 times daily for 42 days    Associated Diagnoses: Infection of prosthetic joint, initial encounter (H24)           CONTINUE these medications which have NOT CHANGED    Details   albuterol (PROAIR HFA/PROVENTIL HFA/VENTOLIN HFA) 108 (90 Base) MCG/ACT inhaler Inhale 2 puffs into the lungs daily as needed for shortness of breath / dyspnea or wheezing    Comments: Pharmacy may dispense brand covered by insurance (Proair, or proventil or ventolin or generic albuterol inhaler)      budesonide-formoterol (SYMBICORT) 80-4.5 MCG/ACT Inhaler Inhale 2 puffs into the lungs 2 times daily      ezetimibe (ZETIA) 10 MG tablet Take 10 mg by mouth daily      FLUoxetine (PROZAC) 40 MG capsule Take 40 mg by mouth 2 times daily      gabapentin (NEURONTIN) 300 MG capsule Take 900 mg by mouth 3 times daily (Take 3 X 300 mg = 900 mg dose)      olmesartan-hydrochlorothiazide (BENICAR HCT) 20-12.5 MG tablet Take 1 tablet by mouth daily      risperiDONE (RISPERDAL) 1 MG tablet Take 1 mg by mouth 3 times daily      tiotropium (SPIRIVA RESPIMAT) 2.5 MCG/ACT inhaler Inhale 2 puffs into the lungs every morning       torsemide (DEMADEX) 20 MG tablet Take 20 mg by mouth daily      traZODone (DESYREL) 100 MG tablet Take 100 mg by mouth At Bedtime       amoxicillin (AMOXIL) 500 MG capsule Take 2,000 mg by mouth daily as needed (1 hour prior to dental work)           Allergies   Allergies   Allergen Reactions    Atorvastatin      Lipitor    Colesevelam Nausea and Vomiting    Lisinopril Cough    Pravastatin Other (See Comments) and Muscle Pain (Myalgia)     myalgia    Rosuvastatin      crestor    Statins [Statins] Other (See Comments)     Aches

## 2024-08-06 NOTE — PROGRESS NOTES
patient needs to schedule an er f/u   Care Management Discharge Note    Discharge Date: 08/06/2024       Discharge Disposition: Transitional Care    Discharge Services:      Discharge DME: Walker    Discharge Transportation:      Private pay costs discussed: private room/amenity fees and transportation costs    Does the patient's insurance plan have a 3 day qualifying hospital stay waiver?  Yes     Which insurance plan 3 day waiver is available? Alternative insurance waiver    Will the waiver be used for post-acute placement? No    PAS Confirmation Code: [id number]  Patient/family educated on Medicare website which has current facility and service quality ratings: yes    Education Provided on the Discharge Plan: Yes  Persons Notified of Discharge Plans: yes  Patient/Family in Agreement with the Plan: yes    Handoff Referral Completed: No    Additional Information:  Patient accepted to [facility] today into a private room. Colleen said that [facility] is still waiving prior auth until 2025 so she can come anytime. Writer spoke to [facility] who noted that they have not heard from [facility] yet but would not be able to take patient until tomorrow as they are still getting prior auth before patient admits. So if they would accept her it would not be today. Writer updated patient that [facility] can't take her today so she can discharge to [facility] who can. She is in agreement. Writer spoke to patient regarding wheelchair vs stretcher. She feels that a stretcher is needed due to pain with sitting, length of ride and inability to bend her leg. Call to [facility] and stretcher set up for these reasons today (first available) between [phone]. PCS completed and faxed. Provider updated and orders requested. PAS completed.    PAS-RR    D: Per DHS regulation, FREIDA completed and submitted PAS-RR to [place] [employer] via the [employer].  PAS-RR confirmation # is : [id number]    I: FREIDA spoke with patient and they are aware a PAS-RR has been  submitted.  SW reviewed with patient that they may be contacted for a follow up appointment within 10 days of hospital discharge if their SNF stay is < 30 days.  Contact information for [employer] was also provided.    A: patient verbalized understanding.    P: Further questions may be directed to [employer] Line at #[phone], option #4 for [facility]-RR staff.    Orders received for discharge and faxed to Colleen Covarrubias.    APPLE Bautista           ambulatory

## 2024-08-07 ENCOUNTER — PATIENT OUTREACH (OUTPATIENT)
Dept: CARE COORDINATION | Facility: CLINIC | Age: 74
End: 2024-08-07
Payer: COMMERCIAL

## 2024-08-07 LAB
BACTERIA TISS BX CULT: NORMAL

## 2024-08-07 NOTE — PROGRESS NOTES
[facility] Care Resource [place]    Background: Transitional Care Management program identified per system criteria and reviewed by [facility] team for possible outreach.    Assessment: Upon chart review, [facility] Team member will not proceed with patient outreach related to this episode of Transitional Care Management program due to reason below:    Non-[facility] TCU: CCRC team member noted patient discharged to TCU/ARU/LTACH. Patient is not established with a [facility] [place] [facility] currently supported by Primary Care-Care Coordination therefore handoff to Primary Care-Care Coordination is not appropriate at this time.    Plan: Transitional Care Management episode addressed appropriately per reason noted above.      APPLE Cruz  Connected Care Resource [place], [facility]    *Connected Care Resource Team does NOT follow patient ongoing. Referrals are identified based on internal discharge reports and the outreach is to ensure patient has an understanding of their discharge instructions.

## (undated) DEVICE — PREP CHLORAPREP 26ML TINTED ORANGE  260815

## (undated) DEVICE — SU VICRYL 0 CP-1 27" J467H

## (undated) DEVICE — SU MONOCRYL 3-0 PS-2 27" Y427H

## (undated) DEVICE — SYR 10ML FINGER CONTROL W/O NDL 309695

## (undated) DEVICE — SYR 50ML LL W/O NDL 309653

## (undated) DEVICE — SUCTION IRR SYSTEM W/O TIP INTERPULSE HANDPIECE 0210-100-000

## (undated) DEVICE — SOL WATER IRRIG 1000ML BOTTLE 2F7114

## (undated) DEVICE — SU ETHIBOND 2 V-37 4X30" MX69G

## (undated) DEVICE — DRSG WND NEGATIVE PRESSURE PREVENA 90CM PRE4055US

## (undated) DEVICE — SLING ARM LG 79-99157

## (undated) DEVICE — BONE CLEANING TIP INTERPULSE  0210-010-000

## (undated) DEVICE — SOL NACL 0.9% IRRIG 1000ML BOTTLE 2F7124

## (undated) DEVICE — KIT DRSG PREVENA PLUS NEG PRESSURE W/CANISTER PRE4001US

## (undated) DEVICE — GLOVE BIOGEL PI MICRO INDICATOR UNDERGLOVE SZ 7.5 48975

## (undated) DEVICE — GLOVE BIOGEL PI SZ 7.5 40875

## (undated) DEVICE — SU VICRYL 2-0 CP-1 27" UND J266H

## (undated) DEVICE — SU VICRYL 2-0 TIE 12X18" J905T

## (undated) DEVICE — GLOVE PROTEXIS POWDER FREE 8.0 ORTHOPEDIC 2D73ET80

## (undated) DEVICE — DECANTER BAG 2002S

## (undated) DEVICE — SU VICRYL 0 CTX CR 8X18" J764D

## (undated) DEVICE — GLOVE PROTEXIS BLUE W/NEU-THERA 8.5  2D73EB85

## (undated) DEVICE — DRSG KERLIX FLUFFS X5

## (undated) DEVICE — GLOVE PROTEXIS W/NEU-THERA 8.0  2D73TE80

## (undated) DEVICE — Device

## (undated) DEVICE — MANIFOLD NEPTUNE 4 PORT 700-20

## (undated) DEVICE — SOL NACL 0.9% INJ 1000ML BAG 2B1324X

## (undated) DEVICE — BONE CEMENT MIXEVAC III HI VAC KIT  0206-015-000

## (undated) DEVICE — BLADE SAW SAGITTAL STRK 18X90X1.37MM HD SYS 6 6118-137-090

## (undated) DEVICE — SU ETHIBOND 1 OS-4 30" X518H

## (undated) DEVICE — ESU ELEC NDL 1" E1552

## (undated) DEVICE — DRSG PREVENA NEGATIVE PRESSURE 20CM WND PRE1001US

## (undated) DEVICE — LINEN TOWEL PACK X5 5464

## (undated) DEVICE — NDL 18GA 1.5" 305196

## (undated) DEVICE — DRAIN ROUND W/RESERV KIT JACKSON PRATT 10FR 400ML SU130-402D

## (undated) DEVICE — DRSG ADAPTIC 3X8" 6113

## (undated) DEVICE — PACK TOTAL KNEE SOP15TKFSD

## (undated) DEVICE — SU ETHIBOND 0 CTX CR  8X18" CX31D

## (undated) DEVICE — NDL 19GA 1.5"

## (undated) DEVICE — NDL 22GA 1.5"

## (undated) DEVICE — SU ETHIBOND 1 CT-1 30" X425H

## (undated) DEVICE — SYR 30ML LL W/O NDL 302832

## (undated) DEVICE — GLOVE PROTEXIS BLUE W/NEU-THERA 8.0  2D73EB80

## (undated) DEVICE — SYR 50ML CATH TIP W/O NDL 309620

## (undated) DEVICE — SU ETHIBOND 1 CTX 30" X865H

## (undated) DEVICE — IMM KNEE 20" 0814-2660

## (undated) DEVICE — ESU GROUND PAD UNIVERSAL W/O CORD

## (undated) DEVICE — SU STRATAFIX PDS PLUS 2-0 SPIRAL CT-1 9" 22CM SXPP1B456

## (undated) DEVICE — TAPE DRSG UNIVERSAL CLOTH 3" WHITE LATEX 881-3

## (undated) DEVICE — SU ETHILON 3-0 FS-1 18" 669H

## (undated) DEVICE — DRAPE POUCH INSTRUMENT 1018

## (undated) DEVICE — DRSG ABDOMINAL 07 1/2X8" 7197D

## (undated) DEVICE — DRAIN JACKSON PRATT 07MM FLAT 3/4 PERF

## (undated) DEVICE — BLADE SAW SAGITTAL STRK 20.7X85X0.89MM 2108-109-000S13

## (undated) DEVICE — DRAPE STERI U 1015

## (undated) DEVICE — GLOVE PROTEXIS POWDER FREE 8.5 ORTHOPEDIC 2D73ET85

## (undated) DEVICE — PACK OPEN SHOULDER SOP15OCFSC

## (undated) DEVICE — PREP SKIN SCRUB TRAY 4461A

## (undated) DEVICE — SPONGE LAP 18X18" X8435

## (undated) DEVICE — SU VICRYL 1 CTX CR 8X18" J765D

## (undated) DEVICE — SU MONOCRYL 2-0 SH 27" UND Y417H

## (undated) DEVICE — HOOD FLYTE W/PEELAWAY 408-800-100

## (undated) DEVICE — DRAPE SHEET REV FOLD 3/4 9349

## (undated) DEVICE — DRAPE IOBAN INCISE 23X17" 6650EZ

## (undated) DEVICE — PACK SET-UP STD 9102

## (undated) DEVICE — SU VICRYL 2-0 CT-1 27" UND J259H

## (undated) DEVICE — GLOVE PROTEXIS W/NEU-THERA 8.5  2D73TE85

## (undated) DEVICE — CAST PADDING 6" UNSTERILE 9046

## (undated) DEVICE — SU PDS II 1 CT MONOFIL Z353H

## (undated) DEVICE — BNDG ELASTIC 6" DBL LENGTH UNSTERILE 6611-16

## (undated) DEVICE — SU STRATAFIX PDS PLUS 1 CT-1 18" SXPP1A404

## (undated) DEVICE — HOOD SURG T7PLUS PEEL AWAY FACE SHIELD STRL LF 0416-801-100

## (undated) DEVICE — SU PDS II 0 CT-2 27" Z334H

## (undated) DEVICE — BLADE SAW SAGITTAL STRK 18X90X1.27MM HD SYS 6 6118-127-090

## (undated) DEVICE — SOLUTION WOUND CLEANSING 3/4OZ 10% PVP EA-L3011FB-50

## (undated) RX ORDER — PROPOFOL 10 MG/ML
INJECTION, EMULSION INTRAVENOUS
Status: DISPENSED
Start: 2020-08-26

## (undated) RX ORDER — ONDANSETRON 2 MG/ML
INJECTION INTRAMUSCULAR; INTRAVENOUS
Status: DISPENSED
Start: 2020-08-26

## (undated) RX ORDER — FENTANYL CITRATE 50 UG/ML
INJECTION, SOLUTION INTRAMUSCULAR; INTRAVENOUS
Status: DISPENSED
Start: 2019-11-20

## (undated) RX ORDER — ONDANSETRON 2 MG/ML
INJECTION INTRAMUSCULAR; INTRAVENOUS
Status: DISPENSED
Start: 2024-07-31

## (undated) RX ORDER — TRANEXAMIC ACID 10 MG/ML
INJECTION, SOLUTION INTRAVENOUS
Status: DISPENSED
Start: 2024-07-31

## (undated) RX ORDER — ONDANSETRON 2 MG/ML
INJECTION INTRAMUSCULAR; INTRAVENOUS
Status: DISPENSED
Start: 2019-11-20

## (undated) RX ORDER — LIDOCAINE HYDROCHLORIDE 20 MG/ML
INJECTION, SOLUTION EPIDURAL; INFILTRATION; INTRACAUDAL; PERINEURAL
Status: DISPENSED
Start: 2020-08-26

## (undated) RX ORDER — VANCOMYCIN HYDROCHLORIDE 1 G/20ML
INJECTION, POWDER, LYOPHILIZED, FOR SOLUTION INTRAVENOUS
Status: DISPENSED
Start: 2020-08-26

## (undated) RX ORDER — CEFAZOLIN SODIUM 1 G/3ML
INJECTION, POWDER, FOR SOLUTION INTRAMUSCULAR; INTRAVENOUS
Status: DISPENSED
Start: 2019-11-20

## (undated) RX ORDER — FENTANYL CITRATE 0.05 MG/ML
INJECTION, SOLUTION INTRAMUSCULAR; INTRAVENOUS
Status: DISPENSED
Start: 2024-07-31

## (undated) RX ORDER — BUPIVACAINE HYDROCHLORIDE AND EPINEPHRINE 2.5; 5 MG/ML; UG/ML
INJECTION, SOLUTION EPIDURAL; INFILTRATION; INTRACAUDAL; PERINEURAL
Status: DISPENSED
Start: 2019-11-20

## (undated) RX ORDER — NEOSTIGMINE METHYLSULFATE 1 MG/ML
VIAL (ML) INJECTION
Status: DISPENSED
Start: 2019-11-20

## (undated) RX ORDER — CEFAZOLIN SODIUM 2 G/100ML
INJECTION, SOLUTION INTRAVENOUS
Status: DISPENSED
Start: 2020-08-26

## (undated) RX ORDER — CEFAZOLIN SODIUM/WATER 2 G/20 ML
SYRINGE (ML) INTRAVENOUS
Status: DISPENSED
Start: 2024-07-31

## (undated) RX ORDER — FENTANYL CITRATE 50 UG/ML
INJECTION, SOLUTION INTRAMUSCULAR; INTRAVENOUS
Status: DISPENSED
Start: 2024-07-31

## (undated) RX ORDER — HYDROMORPHONE HCL IN WATER/PF 6 MG/30 ML
PATIENT CONTROLLED ANALGESIA SYRINGE INTRAVENOUS
Status: DISPENSED
Start: 2024-07-31

## (undated) RX ORDER — FENTANYL CITRATE 50 UG/ML
INJECTION, SOLUTION INTRAMUSCULAR; INTRAVENOUS
Status: DISPENSED
Start: 2020-08-26

## (undated) RX ORDER — DEXAMETHASONE SODIUM PHOSPHATE 4 MG/ML
INJECTION, SOLUTION INTRA-ARTICULAR; INTRALESIONAL; INTRAMUSCULAR; INTRAVENOUS; SOFT TISSUE
Status: DISPENSED
Start: 2024-07-31

## (undated) RX ORDER — LIDOCAINE HYDROCHLORIDE 20 MG/ML
INJECTION, SOLUTION EPIDURAL; INFILTRATION; INTRACAUDAL; PERINEURAL
Status: DISPENSED
Start: 2019-11-20

## (undated) RX ORDER — PROPOFOL 10 MG/ML
INJECTION, EMULSION INTRAVENOUS
Status: DISPENSED
Start: 2024-07-31

## (undated) RX ORDER — CEFAZOLIN SODIUM 2 G/100ML
INJECTION, SOLUTION INTRAVENOUS
Status: DISPENSED
Start: 2019-11-20

## (undated) RX ORDER — GLYCOPYRROLATE 0.2 MG/ML
INJECTION, SOLUTION INTRAMUSCULAR; INTRAVENOUS
Status: DISPENSED
Start: 2019-11-20

## (undated) RX ORDER — HYDROMORPHONE HYDROCHLORIDE 1 MG/ML
INJECTION, SOLUTION INTRAMUSCULAR; INTRAVENOUS; SUBCUTANEOUS
Status: DISPENSED
Start: 2019-11-20

## (undated) RX ORDER — ALBUTEROL SULFATE 90 UG/1
AEROSOL, METERED RESPIRATORY (INHALATION)
Status: DISPENSED
Start: 2019-11-20